# Patient Record
Sex: FEMALE | Race: WHITE | NOT HISPANIC OR LATINO | Employment: OTHER | URBAN - METROPOLITAN AREA
[De-identification: names, ages, dates, MRNs, and addresses within clinical notes are randomized per-mention and may not be internally consistent; named-entity substitution may affect disease eponyms.]

---

## 2017-01-04 ENCOUNTER — OFFICE VISIT (OUTPATIENT)
Dept: FAMILY MEDICINE CLINIC | Facility: CLINIC | Age: 67
End: 2017-01-04

## 2017-01-04 VITALS
DIASTOLIC BLOOD PRESSURE: 64 MMHG | OXYGEN SATURATION: 98 % | RESPIRATION RATE: 16 BRPM | HEART RATE: 68 BPM | TEMPERATURE: 98 F | WEIGHT: 104 LBS | HEIGHT: 62 IN | BODY MASS INDEX: 19.14 KG/M2 | SYSTOLIC BLOOD PRESSURE: 110 MMHG

## 2017-01-04 DIAGNOSIS — R51.9 HEADACHE, UNSPECIFIED HEADACHE TYPE: Primary | ICD-10-CM

## 2017-01-04 DIAGNOSIS — Z83.438 FAMILY HISTORY OF ELEVATED BLOOD LIPIDS: ICD-10-CM

## 2017-01-04 DIAGNOSIS — R79.89 LOW VITAMIN D LEVEL: ICD-10-CM

## 2017-01-04 PROCEDURE — 99214 OFFICE O/P EST MOD 30 MIN: CPT | Performed by: FAMILY MEDICINE

## 2017-01-04 RX ORDER — BUTALBITAL, ACETAMINOPHEN, CAFFEINE AND CODEINE PHOSPHATE 50; 325; 40; 30 MG/1; MG/1; MG/1; MG/1
1 CAPSULE ORAL DAILY PRN
Qty: 90 CAPSULE | Refills: 0 | Status: SHIPPED | OUTPATIENT
Start: 2017-01-04 | End: 2017-05-24 | Stop reason: SDUPTHER

## 2017-01-04 RX ORDER — NAPROXEN 375 MG/1
375 TABLET ORAL 2 TIMES DAILY PRN
Qty: 90 TABLET | Refills: 1 | Status: SHIPPED | OUTPATIENT
Start: 2017-01-04 | End: 2019-10-08 | Stop reason: SDUPTHER

## 2017-01-04 RX ORDER — RIZATRIPTAN BENZOATE 10 MG/1
10 TABLET, ORALLY DISINTEGRATING ORAL ONCE AS NEEDED
Qty: 27 TABLET | Refills: 1 | Status: SHIPPED | OUTPATIENT
Start: 2017-01-04 | End: 2017-05-24 | Stop reason: SDUPTHER

## 2017-01-04 NOTE — PATIENT INSTRUCTIONS
Patients must be seen every 3 months for their presription medication review and refill required by [place] law.  Patient has been advised on the potential for addiction  and dependence to their prescribed scheduled medication.  NEVIN was obtained today and reviewed. This was scanned into the patient's chart.  Exercise 30 minutes most days of the week  Sleep 6-8 hours each night if possible  Low fat, low cholesterol diet   we discussed prescribed medications and how to take them   make sure you get results of any labs/studies ordered today  Low glycemic index diet

## 2017-01-04 NOTE — MR AVS SNAPSHOT
Orly Rivas   1/4/2017 1:45 PM   Office Visit    Provider:  Rich Yeung MD   Department:  [facility]   Dept Phone:  [phone]                Your Full Care Plan              Where to Get Your Medications      These medications were sent to [facility] - [place], [place] - [address] [place] [address] - [phone]  - [phone]   [address] [facility] [address]     Phone:  [phone]     naproxen 375 MG tablet    rizatriptan MLT 10 MG disintegrating tablet         You can get these medications from any pharmacy     Bring a paper prescription for each of these medications     butalbital-acetaminophen-caffeine-codeine -33-30 MG per capsule            Your Updated Medication List          This list is accurate as of: 1/4/17  3:02 PM.  Always use your most recent med list.                aspirin 81 MG tablet       butalbital-acetaminophen-caffeine-codeine -66-30 MG per capsule   Commonly known as:  FIORICET WITH CODEINE   Take 1 capsule by mouth Daily As Needed for headaches.       diphenhydrAMINE 25 mg capsule   Commonly known as:  BENADRYL       naproxen 375 MG tablet   Commonly known as:  NAPROSYN   Take 1 tablet by mouth 2 (Two) Times a Day As Needed for mild pain (1-3).       rizatriptan MLT 10 MG disintegrating tablet   Commonly known as:  MAXALT-MLT   Take 1 tablet by mouth 1 (One) Time As Needed for migraine for up to 1 dose. May repeat in 2 hours if needed               You Were Diagnosed With        Codes Comments    Headache, unspecified headache type    -  Primary ICD-10-CM: R51  ICD-9-CM: 784.0     Low vitamin D level     ICD-10-CM: E55.9  ICD-9-CM: 268.9     Family history of elevated blood lipids     ICD-10-CM: Z83.49  ICD-9-CM: V18.19       Instructions     Patients must be seen every 3 months for their presription medication review and refill required by [place] law.  Patient has been advised on the  "potential for addiction  and dependence to their prescribed scheduled medication.  NEVIN was obtained today and reviewed. This was scanned into the patient's chart.  Exercise 30 minutes most days of the week  Sleep 6-8 hours each night if possible  Low fat, low cholesterol diet   we discussed prescribed medications and how to take them   make sure you get results of any labs/studies ordered today  Low glycemic index diet          Patient Instructions History      GoAlbert Signup     [facility] GoAlbert allows you to send messages to your doctor, view your test results, renew your prescriptions, schedule appointments, and more. To sign up, go to [employer] and click on the Sign Up Now link in the New User? box. Enter your GoAlbert Activation Code exactly as it appears below along with the last four digits of your Social Security Number and your Date of Birth () to complete the sign-up process. If you do not sign up before the expiration date, you must request a new code.    GoAlbert Activation Code: [email or web address]  Expires: 2017  3:02 PM    If you have questions, you can email [email or web address] or call [phone] to talk to our GoAlbert staff. Remember, GoAlbert is NOT to be used for urgent needs. For medical emergencies, dial 911.               Other Info from Your Visit           Allergies     No Known Allergies      Reason for Visit     Headache           Vital Signs     Blood Pressure Pulse Temperature Respirations Height Weight    110/64 68 98 °F (36.7 °C) (Oral) 16 62\" (157.5 cm) 104 lb (47.2 kg)    Oxygen Saturation Body Mass Index Smoking Status             98% 19.02 kg/m2 Never Smoker         Problems and Diagnoses Noted     Headache    Low vitamin D level        Family history of elevated blood lipids          "

## 2017-01-04 NOTE — PROGRESS NOTES
"Subjective   Orly Rivas is a 66 y.o. female.     History of Present Illness   Chief Complaint:   Chief Complaint   Patient presents with   • Headache       Orly Rivas 66 y.o. female who presents today for Medical Management of the below listed issues and medication refills. The patient has read and signed the [facility] Controlled Substance Contract.  I will continue to see patient for regular follow up appointments. Headaches 1/week.  They are well controlled on their medication.  NEVIN has been reviewed by me and is updated every 3 months. The patient is aware of the potential for addiction and dependence.   she has a history of   Patient Active Problem List   Diagnosis   • Headache   .  Since the last visit, she has overall felt well.  she has been compliant with   Current Outpatient Prescriptions:   •  aspirin 81 MG tablet, Take 81 mg by mouth daily., Disp: , Rfl:   •  butalbital-acetaminophen-caffeine-codeine (FIORICET WITH CODEINE) -52-30 MG per capsule, Take 1 capsule by mouth daily as needed for headaches., Disp: 30 capsule, Rfl: 2  •  diphenhydrAMINE (BENADRYL) 25 mg capsule, Take 25 mg by mouth every 6 (six) hours as needed for itching., Disp: , Rfl:   •  naproxen (NAPROSYN) 375 MG tablet, Take 1 tablet by mouth 2 (two) times a day as needed for mild pain (1-3)., Disp: 60 tablet, Rfl: 5  •  rizatriptan MLT (MAXALT-MLT) 10 MG disintegrating tablet, Take 1 tablet by mouth 1 (one) time as needed for migraine for up to 1 dose. May repeat in 2 hours if needed, Disp: 9 tablet, Rfl: 5.  she denies medication side effects.    All of the chronic condition(s) listed above are stable w/o issues.    Visit Vitals   • /64   • Pulse 68   • Temp 98 °F (36.7 °C) (Oral)   • Resp 16   • Ht 62\" (157.5 cm)   • Wt 104 lb (47.2 kg)   • SpO2 98%   • BMI 19.02 kg/m2             The following portions of the patient's history were reviewed and updated as appropriate: allergies, current medications, past " family history, past medical history, past social history, past surgical history and problem list.    Review of Systems   Constitutional: Negative for activity change, appetite change and unexpected weight change.   Eyes: Negative for visual disturbance.   Respiratory: Negative for chest tightness and shortness of breath.    Cardiovascular: Negative for chest pain and palpitations.   Skin: Negative for color change.   Neurological: Positive for headaches. Negative for syncope and speech difficulty.   Psychiatric/Behavioral: Negative for confusion and decreased concentration.       Objective   Physical Exam   Constitutional: She is oriented to person, place, and time. She appears well-developed and well-nourished.   HENT:   Head: Normocephalic and atraumatic.   Nose: Nose normal.   Mouth/Throat: Oropharynx is clear and moist.   Eyes: EOM are normal. Pupils are equal, round, and reactive to light.   Neck: Normal range of motion. Neck supple.   Cardiovascular: Normal rate and regular rhythm.    Pulmonary/Chest: Effort normal and breath sounds normal.   Abdominal: Soft.   Neurological: She is alert and oriented to person, place, and time. She has normal reflexes.   Psychiatric: She has a normal mood and affect. Her behavior is normal. Judgment and thought content normal.   Nursing note and vitals reviewed.      Assessment/Plan   Orly was seen today for headache.    Diagnoses and all orders for this visit:    Headache, unspecified headache type  -     butalbital-acetaminophen-caffeine-codeine (FIORICET WITH CODEINE) -45-30 MG per capsule; Take 1 capsule by mouth Daily As Needed for headaches.  -     naproxen (NAPROSYN) 375 MG tablet; Take 1 tablet by mouth 2 (Two) Times a Day As Needed for mild pain (1-3).  -     rizatriptan MLT (MAXALT-MLT) 10 MG disintegrating tablet; Take 1 tablet by mouth 1 (One) Time As Needed for migraine for up to 1 dose. May repeat in 2 hours if needed  -     Comprehensive metabolic  panel; Future  -     Lipid panel; Future  -     CBC and Differential; Future  -     TSH; Future    Low vitamin D level  -     Comprehensive metabolic panel; Future  -     Vitamin D 25 Hydroxy; Future    Family history of elevated blood lipids  -     Comprehensive metabolic panel; Future  -     Lipid panel; Future

## 2017-02-10 ENCOUNTER — APPOINTMENT (OUTPATIENT)
Dept: WOMENS IMAGING | Facility: HOSPITAL | Age: 67
End: 2017-02-10

## 2017-02-10 PROCEDURE — 77063 BREAST TOMOSYNTHESIS BI: CPT | Performed by: RADIOLOGY

## 2017-02-10 PROCEDURE — G0202 SCR MAMMO BI INCL CAD: HCPCS | Performed by: RADIOLOGY

## 2017-05-22 DIAGNOSIS — N95.1 POST MENOPAUSAL SYNDROME: Primary | ICD-10-CM

## 2017-05-22 DIAGNOSIS — R79.89 LOW VITAMIN D LEVEL: ICD-10-CM

## 2017-05-22 DIAGNOSIS — R51.9 HEADACHE, UNSPECIFIED HEADACHE TYPE: ICD-10-CM

## 2017-05-22 DIAGNOSIS — Z83.438 FAMILY HISTORY OF ELEVATED BLOOD LIPIDS: ICD-10-CM

## 2017-05-22 DIAGNOSIS — M85.80 OSTEOPENIA: ICD-10-CM

## 2017-05-22 LAB
25(OH)D3+25(OH)D2 SERPL-MCNC: 37.3 NG/ML (ref 30–100)
ALBUMIN SERPL-MCNC: 4.2 G/DL (ref 3.5–5.2)
ALBUMIN/GLOB SERPL: 1.5 G/DL
ALP SERPL-CCNC: 55 U/L (ref 39–117)
ALT SERPL-CCNC: 15 U/L (ref 1–33)
AST SERPL-CCNC: 22 U/L (ref 1–32)
BASOPHILS # BLD AUTO: 0.06 10*3/MM3 (ref 0–0.2)
BASOPHILS NFR BLD AUTO: 1.4 % (ref 0–1.5)
BILIRUB SERPL-MCNC: 0.3 MG/DL (ref 0.1–1.2)
BUN SERPL-MCNC: 14 MG/DL (ref 8–23)
BUN/CREAT SERPL: 19.7 (ref 7–25)
CALCIUM SERPL-MCNC: 9.8 MG/DL (ref 8.6–10.5)
CHLORIDE SERPL-SCNC: 105 MMOL/L (ref 98–107)
CHOLEST SERPL-MCNC: 205 MG/DL (ref 0–200)
CO2 SERPL-SCNC: 27.8 MMOL/L (ref 22–29)
CREAT SERPL-MCNC: 0.71 MG/DL (ref 0.57–1)
EOSINOPHIL # BLD AUTO: 0.14 10*3/MM3 (ref 0–0.7)
EOSINOPHIL NFR BLD AUTO: 3.2 % (ref 0.3–6.2)
ERYTHROCYTE [DISTWIDTH] IN BLOOD BY AUTOMATED COUNT: 12.7 % (ref 11.7–13)
GLOBULIN SER CALC-MCNC: 2.8 GM/DL
GLUCOSE SERPL-MCNC: 98 MG/DL (ref 65–99)
HCT VFR BLD AUTO: 41.4 % (ref 35.6–45.5)
HDLC SERPL-MCNC: 63 MG/DL (ref 40–60)
HGB BLD-MCNC: 13.5 G/DL (ref 11.9–15.5)
IMM GRANULOCYTES # BLD: 0 10*3/MM3 (ref 0–0.03)
IMM GRANULOCYTES NFR BLD: 0 % (ref 0–0.5)
LDLC SERPL CALC-MCNC: 123 MG/DL (ref 0–100)
LYMPHOCYTES # BLD AUTO: 1.96 10*3/MM3 (ref 0.9–4.8)
LYMPHOCYTES NFR BLD AUTO: 45.4 % (ref 19.6–45.3)
MCH RBC QN AUTO: 30.4 PG (ref 26.9–32)
MCHC RBC AUTO-ENTMCNC: 32.6 G/DL (ref 32.4–36.3)
MCV RBC AUTO: 93.2 FL (ref 80.5–98.2)
MONOCYTES # BLD AUTO: 0.36 10*3/MM3 (ref 0.2–1.2)
MONOCYTES NFR BLD AUTO: 8.3 % (ref 5–12)
NEUTROPHILS # BLD AUTO: 1.8 10*3/MM3 (ref 1.9–8.1)
NEUTROPHILS NFR BLD AUTO: 41.7 % (ref 42.7–76)
PLATELET # BLD AUTO: 243 10*3/MM3 (ref 140–500)
POTASSIUM SERPL-SCNC: 4.1 MMOL/L (ref 3.5–5.2)
PROT SERPL-MCNC: 7 G/DL (ref 6–8.5)
RBC # BLD AUTO: 4.44 10*6/MM3 (ref 3.9–5.2)
SODIUM SERPL-SCNC: 142 MMOL/L (ref 136–145)
TRIGL SERPL-MCNC: 97 MG/DL (ref 0–150)
TSH SERPL DL<=0.005 MIU/L-ACNC: 3.38 MIU/ML (ref 0.27–4.2)
VLDLC SERPL CALC-MCNC: 19.4 MG/DL (ref 5–40)
WBC # BLD AUTO: 4.32 10*3/MM3 (ref 4.5–10.7)

## 2017-05-22 PROCEDURE — 77080 DXA BONE DENSITY AXIAL: CPT | Performed by: FAMILY MEDICINE

## 2017-05-24 ENCOUNTER — OFFICE VISIT (OUTPATIENT)
Dept: FAMILY MEDICINE CLINIC | Facility: CLINIC | Age: 67
End: 2017-05-24

## 2017-05-24 VITALS
HEIGHT: 62 IN | SYSTOLIC BLOOD PRESSURE: 99 MMHG | HEART RATE: 60 BPM | RESPIRATION RATE: 16 BRPM | TEMPERATURE: 98.5 F | BODY MASS INDEX: 18.95 KG/M2 | DIASTOLIC BLOOD PRESSURE: 68 MMHG | OXYGEN SATURATION: 95 % | WEIGHT: 103 LBS

## 2017-05-24 DIAGNOSIS — R51.9 HEADACHE, UNSPECIFIED HEADACHE TYPE: ICD-10-CM

## 2017-05-24 PROCEDURE — 99214 OFFICE O/P EST MOD 30 MIN: CPT | Performed by: FAMILY MEDICINE

## 2017-05-24 RX ORDER — NAPROXEN 375 MG/1
375 TABLET ORAL 2 TIMES DAILY PRN
Qty: 90 TABLET | Refills: 1 | Status: CANCELLED | OUTPATIENT
Start: 2017-05-24

## 2017-05-24 RX ORDER — RIZATRIPTAN BENZOATE 10 MG/1
10 TABLET, ORALLY DISINTEGRATING ORAL ONCE AS NEEDED
Qty: 27 TABLET | Refills: 1 | Status: SHIPPED | OUTPATIENT
Start: 2017-05-24 | End: 2021-07-01 | Stop reason: SDUPTHER

## 2017-05-24 RX ORDER — BUTALBITAL, ACETAMINOPHEN, CAFFEINE AND CODEINE PHOSPHATE 50; 325; 40; 30 MG/1; MG/1; MG/1; MG/1
1 CAPSULE ORAL DAILY PRN
Qty: 90 CAPSULE | Refills: 0 | Status: SHIPPED | OUTPATIENT
Start: 2017-05-24 | End: 2017-11-14 | Stop reason: SDUPTHER

## 2017-06-01 ENCOUNTER — TELEPHONE (OUTPATIENT)
Dept: FAMILY MEDICINE CLINIC | Facility: CLINIC | Age: 67
End: 2017-06-01

## 2017-06-01 NOTE — TELEPHONE ENCOUNTER
Pt is taking vitamin d 1600 units in her multivitamin. She did find Vitamin D gums with 2000 units do you want her to take that too

## 2017-11-14 ENCOUNTER — OFFICE VISIT (OUTPATIENT)
Dept: FAMILY MEDICINE CLINIC | Facility: CLINIC | Age: 67
End: 2017-11-14

## 2017-11-14 VITALS
RESPIRATION RATE: 16 BRPM | WEIGHT: 106 LBS | DIASTOLIC BLOOD PRESSURE: 54 MMHG | HEIGHT: 62 IN | BODY MASS INDEX: 19.51 KG/M2 | TEMPERATURE: 97.8 F | SYSTOLIC BLOOD PRESSURE: 98 MMHG | OXYGEN SATURATION: 99 % | HEART RATE: 67 BPM

## 2017-11-14 DIAGNOSIS — R51.9 HEADACHE, UNSPECIFIED HEADACHE TYPE: ICD-10-CM

## 2017-11-14 PROCEDURE — 99213 OFFICE O/P EST LOW 20 MIN: CPT | Performed by: FAMILY MEDICINE

## 2017-11-14 RX ORDER — BUTALBITAL, ACETAMINOPHEN, CAFFEINE AND CODEINE PHOSPHATE 50; 325; 40; 30 MG/1; MG/1; MG/1; MG/1
1 CAPSULE ORAL DAILY PRN
Qty: 90 CAPSULE | Refills: 0 | Status: SHIPPED | OUTPATIENT
Start: 2017-11-14 | End: 2018-05-01 | Stop reason: SDUPTHER

## 2017-11-14 RX ORDER — NAPROXEN 375 MG/1
375 TABLET ORAL 2 TIMES DAILY PRN
Qty: 90 TABLET | Refills: 1 | Status: CANCELLED | OUTPATIENT
Start: 2017-11-14

## 2017-11-14 NOTE — PROGRESS NOTES
"Subjective   Orly Rivas is a 67 y.o. female.     History of Present Illness   Chief Complaint:   Chief Complaint   Patient presents with   • Headache       Orly Rivas 67 y.o. female who presents today for Medical Management of the below listed issues and medication refills. The patient has read and signed the [facility] Controlled Substance Contract.  I will continue to see patient for regular follow up appointments.  They are well controlled on their medication.  NEVIN has been reviewed by me and is updated every 3 months. The patient is aware of the potential for addiction and dependence.    she has a problem list of   Patient Active Problem List   Diagnosis   • Headache   .  Since the last visit, she has overall felt well.  she has been compliant with   Current Outpatient Prescriptions:   •  aspirin 81 MG tablet, Take 81 mg by mouth daily., Disp: , Rfl:   •  butalbital-acetaminophen-caffeine-codeine (FIORICET WITH CODEINE) -93-30 MG per capsule, Take 1 capsule by mouth Daily As Needed for Headache., Disp: 90 capsule, Rfl: 0  •  diphenhydrAMINE (BENADRYL) 25 mg capsule, Take 25 mg by mouth every 6 (six) hours as needed for itching., Disp: , Rfl:   •  naproxen (NAPROSYN) 375 MG tablet, Take 1 tablet by mouth 2 (Two) Times a Day As Needed for mild pain (1-3)., Disp: 90 tablet, Rfl: 1  •  rizatriptan MLT (MAXALT-MLT) 10 MG disintegrating tablet, Take 1 tablet by mouth 1 (One) Time As Needed for Migraine for up to 1 dose. May repeat in 2 hours if needed, Disp: 27 tablet, Rfl: 1.  she denies medication side effects. Migraine  q 1-2 weeks    All of the chronic condition(s) listed above are stable w/o issues.    BP 98/54  Pulse 67  Temp 97.8 °F (36.6 °C) (Oral)   Resp 16  Ht 62\" (157.5 cm)  Wt 106 lb (48.1 kg)  SpO2 99%  BMI 19.39 kg/m2    The following portions of the patient's history were reviewed and updated as appropriate: allergies, current medications, past family history, past " medical history, past social history, past surgical history and problem list.    Review of Systems   Constitutional: Negative for activity change, appetite change and unexpected weight change.   Eyes: Negative for visual disturbance.   Respiratory: Negative for chest tightness and shortness of breath.    Cardiovascular: Negative for chest pain and palpitations.   Skin: Negative for color change.   Neurological: Negative for syncope and speech difficulty.   Psychiatric/Behavioral: Negative for confusion and decreased concentration.       Objective   Physical Exam   Constitutional: She is oriented to person, place, and time. She appears well-developed and well-nourished.   HENT:   Head: Normocephalic.   Nose: Nose normal.   Mouth/Throat: Oropharynx is clear and moist.   Eyes: Pupils are equal, round, and reactive to light.   Neck: Normal range of motion. Neck supple.   Cardiovascular: Normal rate and regular rhythm.    Pulmonary/Chest: Effort normal and breath sounds normal.   Lymphadenopathy:     She has no cervical adenopathy.   Neurological: She is alert and oriented to person, place, and time.   Psychiatric: She has a normal mood and affect. Her behavior is normal.   Nursing note and vitals reviewed.      Assessment/Plan   Orly was seen today for headache.    Diagnoses and all orders for this visit:    Headache, unspecified headache type  -     butalbital-acetaminophen-caffeine-codeine (FIORICET WITH CODEINE) -98-30 MG per capsule; Take 1 capsule by mouth Daily As Needed for Headache.    Other orders  -     Cancel: naproxen (NAPROSYN) 375 MG tablet; Take 1 tablet by mouth 2 (Two) Times a Day As Needed for Mild Pain .

## 2017-12-21 DIAGNOSIS — M81.0 OSTEOPOROSIS, UNSPECIFIED OSTEOPOROSIS TYPE, UNSPECIFIED PATHOLOGICAL FRACTURE PRESENCE: Primary | ICD-10-CM

## 2018-01-11 LAB
BUN SERPL-MCNC: 13 MG/DL (ref 8–23)
BUN/CREAT SERPL: 16 (ref 7–25)
CALCIUM SERPL-MCNC: 9.7 MG/DL (ref 8.6–10.5)
CHLORIDE SERPL-SCNC: 106 MMOL/L (ref 98–107)
CO2 SERPL-SCNC: 27.4 MMOL/L (ref 22–29)
CREAT SERPL-MCNC: 0.81 MG/DL (ref 0.57–1)
GLUCOSE SERPL-MCNC: 92 MG/DL (ref 65–99)
POTASSIUM SERPL-SCNC: 4.1 MMOL/L (ref 3.5–5.2)
SODIUM SERPL-SCNC: 146 MMOL/L (ref 136–145)

## 2018-01-16 PROBLEM — M81.0 OSTEOPOROSIS, POST-MENOPAUSAL: Status: ACTIVE | Noted: 2018-01-16

## 2018-01-17 ENCOUNTER — HOSPITAL ENCOUNTER (OUTPATIENT)
Dept: INFUSION THERAPY | Facility: HOSPITAL | Age: 68
Discharge: HOME | End: 2018-01-17

## 2018-01-23 ENCOUNTER — HOSPITAL ENCOUNTER (OUTPATIENT)
Dept: INFUSION THERAPY | Facility: HOSPITAL | Age: 68
Discharge: HOME | End: 2018-01-23
Admitting: FAMILY MEDICINE

## 2018-01-23 VITALS
RESPIRATION RATE: 16 BRPM | HEART RATE: 67 BPM | TEMPERATURE: 96.8 F | OXYGEN SATURATION: 99 % | SYSTOLIC BLOOD PRESSURE: 123 MMHG | DIASTOLIC BLOOD PRESSURE: 71 MMHG

## 2018-01-23 DIAGNOSIS — M81.0 OSTEOPOROSIS, POST-MENOPAUSAL: ICD-10-CM

## 2018-01-23 PROCEDURE — 96372 THER/PROPH/DIAG INJ SC/IM: CPT

## 2018-01-23 PROCEDURE — 25010000002 DENOSUMAB 60 MG/ML SOLUTION: Performed by: FAMILY MEDICINE

## 2018-01-23 RX ADMIN — DENOSUMAB 60 MG: 60 INJECTION SUBCUTANEOUS at 11:44

## 2018-01-23 NOTE — PROGRESS NOTES
Tolerated well. Monitored for 30 minutes for first dose protocol without incident. D/C'd per ambulation@ 1215.

## 2018-03-02 ENCOUNTER — APPOINTMENT (OUTPATIENT)
Dept: WOMENS IMAGING | Facility: HOSPITAL | Age: 68
End: 2018-03-02

## 2018-03-02 PROCEDURE — 77063 BREAST TOMOSYNTHESIS BI: CPT | Performed by: RADIOLOGY

## 2018-03-02 PROCEDURE — 77067 SCR MAMMO BI INCL CAD: CPT | Performed by: RADIOLOGY

## 2018-05-01 ENCOUNTER — OFFICE VISIT (OUTPATIENT)
Dept: FAMILY MEDICINE CLINIC | Facility: CLINIC | Age: 68
End: 2018-05-01

## 2018-05-01 VITALS
RESPIRATION RATE: 16 BRPM | WEIGHT: 104 LBS | DIASTOLIC BLOOD PRESSURE: 60 MMHG | TEMPERATURE: 98.3 F | SYSTOLIC BLOOD PRESSURE: 89 MMHG | OXYGEN SATURATION: 97 % | HEART RATE: 79 BPM | BODY MASS INDEX: 19.14 KG/M2 | HEIGHT: 62 IN

## 2018-05-01 DIAGNOSIS — M81.0 OSTEOPOROSIS, POST-MENOPAUSAL: ICD-10-CM

## 2018-05-01 DIAGNOSIS — R51.9 HEADACHE, UNSPECIFIED HEADACHE TYPE: Primary | ICD-10-CM

## 2018-05-01 PROCEDURE — 99214 OFFICE O/P EST MOD 30 MIN: CPT | Performed by: FAMILY MEDICINE

## 2018-05-01 RX ORDER — RIZATRIPTAN BENZOATE 10 MG/1
10 TABLET, ORALLY DISINTEGRATING ORAL ONCE AS NEEDED
Qty: 27 TABLET | Refills: 1 | Status: CANCELLED | OUTPATIENT
Start: 2018-05-01

## 2018-05-01 RX ORDER — NAPROXEN 375 MG/1
375 TABLET ORAL 2 TIMES DAILY PRN
Qty: 90 TABLET | Refills: 1 | Status: CANCELLED | OUTPATIENT
Start: 2018-05-01

## 2018-05-01 RX ORDER — BUTALBITAL, ACETAMINOPHEN, CAFFEINE AND CODEINE PHOSPHATE 50; 325; 40; 30 MG/1; MG/1; MG/1; MG/1
1 CAPSULE ORAL DAILY PRN
Qty: 90 CAPSULE | Refills: 0 | Status: SHIPPED | OUTPATIENT
Start: 2018-05-01 | End: 2018-05-08 | Stop reason: SDUPTHER

## 2018-05-01 NOTE — PROGRESS NOTES
"Subjective   Orly Rivas is a 67 y.o. female.     History of Present Illness   Chief Complaint:   Chief Complaint   Patient presents with   • Headache       Orly Rivas 67 y.o. female who presents today for Medical Management of the below listed issues and medication refills. The patient has read and signed the [facility] Controlled Substance Contract.  I will continue to see patient for regular follow up appointments.  They are well controlled on their medication.  NEVIN has been reviewed by me and is updated every 3 months. The patient is aware of the potential for addiction and dependence. She started with uri Sunday and felt aches and chills and now feels ok  Exam neg   / VIRAL SYNDROME  STARTED PROLIA 6 MONTHS AGO  And did fine    she has a problem list of   Patient Active Problem List   Diagnosis   • Headache   • Osteoporosis, post-menopausal   .  Since the last visit, she has overall felt well.  she has been compliant with   Current Outpatient Prescriptions:   •  aspirin 81 MG tablet, Take 81 mg by mouth daily., Disp: , Rfl:   •  butalbital-acetaminophen-caffeine-codeine (FIORICET WITH CODEINE) -52-30 MG per capsule, Take 1 capsule by mouth Daily As Needed for Headache., Disp: 90 capsule, Rfl: 0  •  diphenhydrAMINE (BENADRYL) 25 mg capsule, Take 25 mg by mouth every 6 (six) hours as needed for itching., Disp: , Rfl:   •  Loratadine (ALLERGY RELIEF CHILD PO), Take  by mouth., Disp: , Rfl:   •  naproxen (NAPROSYN) 375 MG tablet, Take 1 tablet by mouth 2 (Two) Times a Day As Needed for mild pain (1-3)., Disp: 90 tablet, Rfl: 1  •  rizatriptan MLT (MAXALT-MLT) 10 MG disintegrating tablet, Take 1 tablet by mouth 1 (One) Time As Needed for Migraine for up to 1 dose. May repeat in 2 hours if needed, Disp: 27 tablet, Rfl: 1.  she denies medication side effects.    All of the chronic condition(s) listed above are stable w/o issues.    Resp 16   Ht 157.5 cm (62\")     The following portions of the " patient's history were reviewed and updated as appropriate: allergies, current medications, past family history, past medical history, past social history, past surgical history and problem list.    Review of Systems   Constitutional: Negative for activity change, appetite change, fatigue and unexpected weight change.   HENT: Negative for congestion.    Eyes: Negative for visual disturbance.   Respiratory: Negative for chest tightness and shortness of breath.    Cardiovascular: Negative for chest pain and palpitations.   Gastrointestinal: Negative for abdominal pain and constipation.   Endocrine: Negative for cold intolerance, heat intolerance, polydipsia, polyphagia and polyuria.   Genitourinary: Negative for difficulty urinating, dysuria and menstrual problem.   Musculoskeletal: Negative for arthralgias.   Skin: Negative for rash.   Neurological: Negative for headaches.   Psychiatric/Behavioral: Negative for behavioral problems, dysphoric mood and sleep disturbance. The patient is not nervous/anxious.        Objective   Physical Exam   Constitutional: She is oriented to person, place, and time. She appears well-developed and well-nourished.   HENT:   Head: Normocephalic and atraumatic.   Eyes: EOM are normal.   Neck: Normal range of motion.   Cardiovascular: Normal rate and regular rhythm.    Pulmonary/Chest: Effort normal and breath sounds normal.   Abdominal: Soft. Bowel sounds are normal.   Musculoskeletal: Normal range of motion.   Neurological: She is alert and oriented to person, place, and time.   Skin: Skin is warm and dry.   Psychiatric: She has a normal mood and affect. Her behavior is normal.   Nursing note and vitals reviewed.      Assessment/Plan   Orly was seen today for headache.    Diagnoses and all orders for this visit:    Headache, unspecified headache type  -     butalbital-acetaminophen-caffeine-codeine (FIORICET WITH CODEINE) -55-30 MG per capsule; Take 1 capsule by mouth Daily As  Needed for Headache.    Osteoporosis, post-menopausal    Other orders  -     Cancel: rizatriptan MLT (MAXALT-MLT) 10 MG disintegrating tablet; Take 1 tablet by mouth 1 (One) Time As Needed for Migraine for up to 1 dose. May repeat in 2 hours if needed  -     Cancel: naproxen (NAPROSYN) 375 MG tablet; Take 1 tablet by mouth 2 (Two) Times a Day As Needed for Mild Pain .

## 2018-05-08 ENCOUNTER — TELEPHONE (OUTPATIENT)
Dept: FAMILY MEDICINE CLINIC | Facility: CLINIC | Age: 68
End: 2018-05-08

## 2018-05-08 DIAGNOSIS — R51.9 HEADACHE, UNSPECIFIED HEADACHE TYPE: ICD-10-CM

## 2018-05-08 RX ORDER — BUTALBITAL, ACETAMINOPHEN, CAFFEINE AND CODEINE PHOSPHATE 50; 325; 40; 30 MG/1; MG/1; MG/1; MG/1
1 CAPSULE ORAL DAILY PRN
Qty: 50 CAPSULE | Refills: 1 | Status: SHIPPED | OUTPATIENT
Start: 2018-05-08 | End: 2018-05-23 | Stop reason: SDUPTHER

## 2018-05-08 NOTE — TELEPHONE ENCOUNTER
[facility] called wanting to know if you would the fioricet rx for 45-50 tab instead of the 90 tab. Pt only gets the medication about every six months. So dispensing 90 pills is to much

## 2018-05-23 ENCOUNTER — TELEPHONE (OUTPATIENT)
Dept: FAMILY MEDICINE CLINIC | Facility: CLINIC | Age: 68
End: 2018-05-23

## 2018-05-23 DIAGNOSIS — R51.9 HEADACHE, UNSPECIFIED HEADACHE TYPE: ICD-10-CM

## 2018-05-23 RX ORDER — BUTALBITAL, ACETAMINOPHEN, CAFFEINE AND CODEINE PHOSPHATE 50; 325; 40; 30 MG/1; MG/1; MG/1; MG/1
1 CAPSULE ORAL DAILY PRN
Qty: 7 CAPSULE | Refills: 0 | Status: SHIPPED | OUTPATIENT
Start: 2018-05-23 | End: 2018-11-13 | Stop reason: SDUPTHER

## 2018-07-12 DIAGNOSIS — M81.0 OSTEOPOROSIS, POST-MENOPAUSAL: Primary | ICD-10-CM

## 2018-07-17 LAB
BUN SERPL-MCNC: 13 MG/DL (ref 8–23)
BUN/CREAT SERPL: 17.3 (ref 7–25)
CALCIUM SERPL-MCNC: 9.8 MG/DL (ref 8.6–10.5)
CHLORIDE SERPL-SCNC: 105 MMOL/L (ref 98–107)
CO2 SERPL-SCNC: 26.6 MMOL/L (ref 22–29)
CREAT SERPL-MCNC: 0.75 MG/DL (ref 0.57–1)
GLUCOSE SERPL-MCNC: 91 MG/DL (ref 65–99)
POTASSIUM SERPL-SCNC: 4.3 MMOL/L (ref 3.5–5.2)
SODIUM SERPL-SCNC: 145 MMOL/L (ref 136–145)

## 2018-07-24 ENCOUNTER — INFUSION (OUTPATIENT)
Dept: ONCOLOGY | Facility: HOSPITAL | Age: 68
End: 2018-07-24

## 2018-07-24 DIAGNOSIS — M81.0 OSTEOPOROSIS, POST-MENOPAUSAL: Primary | ICD-10-CM

## 2018-07-24 PROCEDURE — 96372 THER/PROPH/DIAG INJ SC/IM: CPT | Performed by: NURSE PRACTITIONER

## 2018-07-24 PROCEDURE — 25010000002 DENOSUMAB 60 MG/ML SOLUTION: Performed by: FAMILY MEDICINE

## 2018-07-24 RX ADMIN — DENOSUMAB 60 MG: 60 INJECTION SUBCUTANEOUS at 14:25

## 2018-07-24 NOTE — PROGRESS NOTES
Pt arrived ambulatory for Prolia injection.  Labs reviewed and pt voices no complaints or questions regarding the medication.  Pt given instructions to schedule next appt. And injection given without complication.

## 2018-11-06 DIAGNOSIS — M81.0 OSTEOPOROSIS, POST-MENOPAUSAL: Primary | ICD-10-CM

## 2018-11-13 ENCOUNTER — OFFICE VISIT (OUTPATIENT)
Dept: FAMILY MEDICINE CLINIC | Facility: CLINIC | Age: 68
End: 2018-11-13

## 2018-11-13 VITALS
HEIGHT: 62 IN | HEART RATE: 59 BPM | WEIGHT: 106 LBS | OXYGEN SATURATION: 99 % | TEMPERATURE: 97.8 F | RESPIRATION RATE: 16 BRPM | BODY MASS INDEX: 19.51 KG/M2 | DIASTOLIC BLOOD PRESSURE: 66 MMHG | SYSTOLIC BLOOD PRESSURE: 108 MMHG

## 2018-11-13 DIAGNOSIS — R51.9 HEADACHE, UNSPECIFIED HEADACHE TYPE: ICD-10-CM

## 2018-11-13 PROCEDURE — 99213 OFFICE O/P EST LOW 20 MIN: CPT | Performed by: FAMILY MEDICINE

## 2018-11-13 RX ORDER — BUTALBITAL, ACETAMINOPHEN, CAFFEINE AND CODEINE PHOSPHATE 50; 325; 40; 30 MG/1; MG/1; MG/1; MG/1
1 CAPSULE ORAL DAILY PRN
Qty: 45 CAPSULE | Refills: 0 | Status: SHIPPED | OUTPATIENT
Start: 2018-11-13 | End: 2019-02-13 | Stop reason: SDUPTHER

## 2018-11-13 NOTE — PATIENT INSTRUCTIONS
Exercise 30 minutes most days of the week  Sleep 6-8 hours each night if possible  Low fat, low cholesterol diet   we discussed prescribed medications and how to take them   make sure you get results of any labs/studies ordered today  Low glycemic index diet The patient has read and signed the [facility] Controlled Substance Contract.  I will continue to see patient for regular follow up appointments.  They are well controlled on their medication.  NEVIN has been reviewed by me and is updated every 3 months. The patient is aware of the potential for addiction and dependence.

## 2018-11-13 NOTE — PROGRESS NOTES
Subjective   Chief Complaint:   Chief Complaint   Patient presents with   • Headache         History of Present Illness  Patients must be seen every 3 months for their presription medication review and refill required by [place] law.  Patient has been advised on the potential for addiction  and dependence to their prescribed scheduled medication.  NEVIN was obtained today and reviewed. This was scanned into the patient's chart.  To decrease her Fioricet No. 3   To  45 tablets per 3 months.  So many give her 45 tablets of Fioricet No. 3  For 3 months.  And headaches are about the same in number and severity.  Discussed a #45 tablets per 3 months.    She is having 2-3 headaches per week          Orly Rivas 68 y.o. female who presents today for Medical Management of the below listed issues and medication refills.    ICD-10-CM ICD-9-CM   1. Headache, unspecified headache type R51 784.0        she has a problem list of   Patient Active Problem List   Diagnosis   • Headache   • Osteoporosis, post-menopausal   .  Since the last visit, she has overall felt well.  she has been compliant with   Current Outpatient Medications:   •  aspirin 81 MG tablet, Take 81 mg by mouth daily., Disp: , Rfl:   •  butalbital-acetaminophen-caffeine-codeine (FIORICET WITH CODEINE) -04-30 MG per capsule, Take 1 capsule by mouth Daily As Needed for Headache., Disp: 7 capsule, Rfl: 0  •  diphenhydrAMINE (BENADRYL) 25 mg capsule, Take 25 mg by mouth every 6 (six) hours as needed for itching., Disp: , Rfl:   •  Loratadine (ALLERGY RELIEF CHILD PO), Take  by mouth., Disp: , Rfl:   •  naproxen (NAPROSYN) 375 MG tablet, Take 1 tablet by mouth 2 (Two) Times a Day As Needed for mild pain (1-3)., Disp: 90 tablet, Rfl: 1  •  rizatriptan MLT (MAXALT-MLT) 10 MG disintegrating tablet, Take 1 tablet by mouth 1 (One) Time As Needed for Migraine for up to 1 dose. May repeat in 2 hours if needed, Disp: 27 tablet, Rfl: 1.  she denies medication side  "effects.    All of the chronic condition(s) listed above are stable w/o issues.    /66   Pulse 59   Temp 97.8 °F (36.6 °C)   Resp 16   Ht 157.5 cm (62\")   Wt 48.1 kg (106 lb)   SpO2 99%   BMI 19.39 kg/m²     Results for orders placed or performed in visit on 07/12/18   Basic Metabolic Panel   Result Value Ref Range    Glucose 91 65 - 99 mg/dL    BUN 13 8 - 23 mg/dL    Creatinine 0.75 0.57 - 1.00 mg/dL    eGFR Non African Am 77 >60 mL/min/1.73    eGFR African Am 94 >60 mL/min/1.73    BUN/Creatinine Ratio 17.3 7.0 - 25.0    Sodium 145 136 - 145 mmol/L    Potassium 4.3 3.5 - 5.2 mmol/L    Chloride 105 98 - 107 mmol/L    Total CO2 26.6 22.0 - 29.0 mmol/L    Calcium 9.8 8.6 - 10.5 mg/dL             The following portions of the patient's history were reviewed and updated as appropriate: allergies, current medications, past family history, past medical history, past social history, past surgical history and problem list.    Review of Systems   Constitutional: Negative for activity change, appetite change and unexpected weight change.   Eyes: Negative for visual disturbance.   Respiratory: Negative for chest tightness and shortness of breath.    Cardiovascular: Negative for chest pain and palpitations.   Skin: Negative for color change.   Neurological: Negative for syncope and speech difficulty.   Psychiatric/Behavioral: Negative for confusion and decreased concentration.       Objective   Physical Exam   Constitutional: She is oriented to person, place, and time. She appears well-developed and well-nourished.   HENT:   Mouth/Throat: Oropharynx is clear and moist.   Eyes: Pupils are equal, round, and reactive to light.   Cardiovascular: Normal rate and regular rhythm.   Pulmonary/Chest: Effort normal and breath sounds normal.   Abdominal: Soft. Bowel sounds are normal.   Neurological: She is alert and oriented to person, place, and time.   Psychiatric: She has a normal mood and affect. Her behavior is normal. "   Nursing note and vitals reviewed.      Assessment/Plan   Orly was seen today for headache.    Diagnoses and all orders for this visit:    Headache, unspecified headache type  -     butalbital-acetaminophen-caffeine-codeine (FIORICET WITH CODEINE) -59-30 MG per capsule; Take 1 capsule by mouth Daily As Needed for Headache.

## 2019-01-01 ENCOUNTER — RESULTS ENCOUNTER (OUTPATIENT)
Dept: FAMILY MEDICINE CLINIC | Facility: CLINIC | Age: 69
End: 2019-01-01

## 2019-01-01 DIAGNOSIS — M81.0 OSTEOPOROSIS, POST-MENOPAUSAL: ICD-10-CM

## 2019-01-23 LAB
AMBIG ABBREV BMP8 DEFAULT: NORMAL
BUN SERPL-MCNC: 14 MG/DL (ref 8–23)
BUN/CREAT SERPL: 19.7 (ref 7–25)
CALCIUM SERPL-MCNC: 9.6 MG/DL (ref 8.6–10.5)
CHLORIDE SERPL-SCNC: 104 MMOL/L (ref 98–107)
CO2 SERPL-SCNC: 28.4 MMOL/L (ref 22–29)
CREAT SERPL-MCNC: 0.71 MG/DL (ref 0.57–1)
GLUCOSE SERPL-MCNC: 93 MG/DL (ref 65–99)
POTASSIUM SERPL-SCNC: 4.3 MMOL/L (ref 3.5–5.2)
SODIUM SERPL-SCNC: 143 MMOL/L (ref 136–145)

## 2019-01-25 ENCOUNTER — INFUSION (OUTPATIENT)
Dept: ONCOLOGY | Facility: HOSPITAL | Age: 69
End: 2019-01-25

## 2019-01-25 VITALS
WEIGHT: 107.4 LBS | BODY MASS INDEX: 19.77 KG/M2 | RESPIRATION RATE: 18 BRPM | DIASTOLIC BLOOD PRESSURE: 70 MMHG | TEMPERATURE: 98.5 F | OXYGEN SATURATION: 97 % | HEIGHT: 62 IN | HEART RATE: 67 BPM | SYSTOLIC BLOOD PRESSURE: 115 MMHG

## 2019-01-25 DIAGNOSIS — M81.0 OSTEOPOROSIS, POST-MENOPAUSAL: Primary | ICD-10-CM

## 2019-01-25 PROCEDURE — 25010000002 DENOSUMAB 60 MG/ML SOLUTION: Performed by: FAMILY MEDICINE

## 2019-01-25 PROCEDURE — 96372 THER/PROPH/DIAG INJ SC/IM: CPT | Performed by: NURSE PRACTITIONER

## 2019-01-25 RX ADMIN — DENOSUMAB 60 MG: 60 INJECTION SUBCUTANEOUS at 13:39

## 2019-01-25 NOTE — PROGRESS NOTES
Arrived  for prolia injection. Indication and side effects reviewed. Denies recent dental work. Labs and medications verified. Prolia administered in right  arm without incidence. Instructed to call prescribing MD for any concerns or questions and instructed on how to schedule future appts.  Pt vu and discharged ambulatory.

## 2019-02-13 ENCOUNTER — OFFICE VISIT (OUTPATIENT)
Dept: FAMILY MEDICINE CLINIC | Facility: CLINIC | Age: 69
End: 2019-02-13

## 2019-02-13 VITALS
SYSTOLIC BLOOD PRESSURE: 115 MMHG | RESPIRATION RATE: 16 BRPM | BODY MASS INDEX: 19.51 KG/M2 | HEART RATE: 72 BPM | HEIGHT: 62 IN | TEMPERATURE: 97.7 F | DIASTOLIC BLOOD PRESSURE: 67 MMHG | WEIGHT: 106 LBS

## 2019-02-13 DIAGNOSIS — R51.9 HEADACHE, UNSPECIFIED HEADACHE TYPE: ICD-10-CM

## 2019-02-13 DIAGNOSIS — R51.9 NONINTRACTABLE EPISODIC HEADACHE, UNSPECIFIED HEADACHE TYPE: Primary | ICD-10-CM

## 2019-02-13 PROCEDURE — 99214 OFFICE O/P EST MOD 30 MIN: CPT | Performed by: FAMILY MEDICINE

## 2019-02-13 RX ORDER — BUTALBITAL, ACETAMINOPHEN, CAFFEINE AND CODEINE PHOSPHATE 50; 325; 40; 30 MG/1; MG/1; MG/1; MG/1
1 CAPSULE ORAL DAILY PRN
Qty: 45 CAPSULE | Refills: 0 | Status: SHIPPED | OUTPATIENT
Start: 2019-02-13 | End: 2019-05-07 | Stop reason: SDUPTHER

## 2019-02-13 NOTE — PATIENT INSTRUCTIONS
Patients must be seen every 3 months for their presription medication review and refill required by [place] law.  Patient has been advised on the potential for addiction  and dependence to their prescribed scheduled medication.  NEVIN was obtained today and reviewed. This was scanned into the patient's chart.

## 2019-02-13 NOTE — PROGRESS NOTES
Subjective   Chief Complaint:   Chief Complaint   Patient presents with   • Headache         History of Present Illness she comes in today to discuss her refill of her butalbital/Tylenol/caffeine codeine are basically Fioricet with codeine.  So she takes 1 a day as needed migraine headaches and she gets 45 pills per 3 months now and that last her the last time for 3 months on a refill the same thing.  So we are going to do Fioricet with codeine 1 daily as needed headache and 45 capsules.  And then she will come back in 3 months and will make sure that would last her it did this time and 4 pills were left over.  To better get a bone densitometer and she will be due May 2019 and she is getting her Prolia shots every 6 months.  Her Maxalt is used as needed for migraine headaches.            Orly WALL Rob 68 y.o. female who presents today for Medical Management of the below listed issues and medication refills.    ICD-10-CM ICD-9-CM   1. Nonintractable episodic headache, unspecified headache type R51 784.0   2. Headache, unspecified headache type R51 784.0        she has a problem list of   Patient Active Problem List   Diagnosis   • Headache   • Osteoporosis, post-menopausal   .  Since the last visit, she has overall felt well.  she has been compliant with   Current Outpatient Medications:   •  aspirin 81 MG tablet, Take 81 mg by mouth daily., Disp: , Rfl:   •  butalbital-acetaminophen-caffeine-codeine (FIORICET WITH CODEINE) -91-30 MG per capsule, Take 1 capsule by mouth Daily As Needed for Headache., Disp: 45 capsule, Rfl: 0  •  diphenhydrAMINE (BENADRYL) 25 mg capsule, Take 25 mg by mouth every 6 (six) hours as needed for itching., Disp: , Rfl:   •  Loratadine (ALLERGY RELIEF CHILD PO), Take  by mouth., Disp: , Rfl:   •  naproxen (NAPROSYN) 375 MG tablet, Take 1 tablet by mouth 2 (Two) Times a Day As Needed for mild pain (1-3)., Disp: 90 tablet, Rfl: 1  •  rizatriptan MLT (MAXALT-MLT) 10 MG disintegrating  tablet, Take 1 tablet by mouth 1 (One) Time As Needed for Migraine for up to 1 dose. May repeat in 2 hours if needed, Disp: 27 tablet, Rfl: 1.  she denies medication side effects.    All of the chronic condition(s) listed above are stable w/o issues.    There were no vitals taken for this visit.        The following portions of the patient's history were reviewed and updated as appropriate: allergies, current medications, past family history, past medical history, past social history, past surgical history and problem list.    Review of Systems   Constitutional: Negative for activity change, appetite change and unexpected weight change.   Eyes: Negative for visual disturbance.   Respiratory: Negative for chest tightness and shortness of breath.    Cardiovascular: Negative for chest pain and palpitations.   Skin: Negative for color change.   Neurological: Negative for syncope and speech difficulty.   Psychiatric/Behavioral: Negative for confusion and decreased concentration.       Objective   Physical Exam   Constitutional: She is oriented to person, place, and time. She appears well-developed and well-nourished.   HENT:   Mouth/Throat: Oropharynx is clear and moist.   Eyes: Pupils are equal, round, and reactive to light.   Cardiovascular: Normal rate and regular rhythm.   Pulmonary/Chest: Effort normal and breath sounds normal.   Abdominal: Soft. Bowel sounds are normal.   Neurological: She is alert and oriented to person, place, and time.   Psychiatric: She has a normal mood and affect. Her behavior is normal.   Nursing note and vitals reviewed.      Assessment/Plan   Orly was seen today for headache.    Diagnoses and all orders for this visit:    Nonintractable episodic headache, unspecified headache type    Headache, unspecified headache type

## 2019-03-06 ENCOUNTER — RESULTS ENCOUNTER (OUTPATIENT)
Dept: FAMILY MEDICINE CLINIC | Facility: CLINIC | Age: 69
End: 2019-03-06

## 2019-03-06 DIAGNOSIS — R51.9 NONINTRACTABLE EPISODIC HEADACHE, UNSPECIFIED HEADACHE TYPE: ICD-10-CM

## 2019-03-06 DIAGNOSIS — R51.9 HEADACHE, UNSPECIFIED HEADACHE TYPE: ICD-10-CM

## 2019-05-04 LAB
ALBUMIN SERPL-MCNC: 4.4 G/DL (ref 3.5–5.2)
ALBUMIN/GLOB SERPL: 1.7 G/DL
ALP SERPL-CCNC: 42 U/L (ref 39–117)
ALT SERPL-CCNC: 14 U/L (ref 1–33)
AST SERPL-CCNC: 18 U/L (ref 1–32)
BILIRUB SERPL-MCNC: 0.4 MG/DL (ref 0.2–1.2)
BUN SERPL-MCNC: 15 MG/DL (ref 8–23)
BUN/CREAT SERPL: 18.3 (ref 7–25)
CALCIUM SERPL-MCNC: 9.9 MG/DL (ref 8.6–10.5)
CHLORIDE SERPL-SCNC: 103 MMOL/L (ref 98–107)
CO2 SERPL-SCNC: 27.9 MMOL/L (ref 22–29)
CREAT SERPL-MCNC: 0.82 MG/DL (ref 0.57–1)
GLOBULIN SER CALC-MCNC: 2.6 GM/DL
GLUCOSE SERPL-MCNC: 95 MG/DL (ref 65–99)
POTASSIUM SERPL-SCNC: 4.2 MMOL/L (ref 3.5–5.2)
PROT SERPL-MCNC: 7 G/DL (ref 6–8.5)
SODIUM SERPL-SCNC: 142 MMOL/L (ref 136–145)

## 2019-05-07 ENCOUNTER — OFFICE VISIT (OUTPATIENT)
Dept: FAMILY MEDICINE CLINIC | Facility: CLINIC | Age: 69
End: 2019-05-07

## 2019-05-07 VITALS
DIASTOLIC BLOOD PRESSURE: 65 MMHG | SYSTOLIC BLOOD PRESSURE: 104 MMHG | BODY MASS INDEX: 19.32 KG/M2 | RESPIRATION RATE: 16 BRPM | TEMPERATURE: 98 F | WEIGHT: 105 LBS | OXYGEN SATURATION: 98 % | HEIGHT: 62 IN | HEART RATE: 54 BPM

## 2019-05-07 DIAGNOSIS — R51.9 HEADACHE, UNSPECIFIED HEADACHE TYPE: ICD-10-CM

## 2019-05-07 PROCEDURE — 99213 OFFICE O/P EST LOW 20 MIN: CPT | Performed by: FAMILY MEDICINE

## 2019-05-07 RX ORDER — BUTALBITAL, ACETAMINOPHEN, CAFFEINE AND CODEINE PHOSPHATE 50; 325; 40; 30 MG/1; MG/1; MG/1; MG/1
1 CAPSULE ORAL DAILY PRN
Qty: 45 CAPSULE | Refills: 0 | Status: SHIPPED | OUTPATIENT
Start: 2019-05-07 | End: 2019-07-24 | Stop reason: SDUPTHER

## 2019-05-07 NOTE — PATIENT INSTRUCTIONS
Exercise 30 minutes most days of the week  Sleep 6-8 hours each night if possible  Low fat, low cholesterol diet   we discussed prescribed medications and how to take them   make sure you get results of any labs/studies ordered today  Low glycemic index diet      Patients must be seen every 3 months for their presription medication review and refill required by [place] law.  Patient has been advised on the potential for addiction  and dependence to their prescribed scheduled medication.  NEVIN was obtained today and reviewed. This was scanned into the patient's chart.

## 2019-05-07 NOTE — PROGRESS NOTES
Subjective   Chief Complaint:   Chief Complaint   Patient presents with   • Headache         History of Present Illness     The patient has read and signed the [facility] Controlled Substance Contract.  I will continue to see patient for regular follow up appointments.  They are well controlled on their medication.  NEVIN has been reviewed by me and is updated every 3 months. The patient is aware of the potential for addiction and dependence.  She comes in today to refill her Fioricet with codeine for migraine headaches she gets 45 with no refills and this last her 90 days and along with the hot which she uses as needed and these 2 medicines together take care of her migraine headaches.  And she does fine on this.  Viewed her labs and they are good              Orly Rivas 68 y.o. female who presents today for Medical Management of the below listed issues and medication refills.    ICD-10-CM ICD-9-CM   1. Headache, unspecified headache type R51 784.0        she has a problem list of   Patient Active Problem List   Diagnosis   • Headache   • Osteoporosis, post-menopausal   .  Since the last visit, she has overall felt well.  she has been compliant with   Current Outpatient Medications:   •  aspirin 81 MG tablet, Take 81 mg by mouth daily., Disp: , Rfl:   •  butalbital-acetaminophen-caffeine-codeine (FIORICET WITH CODEINE) -85-30 MG per capsule, Take 1 capsule by mouth Daily As Needed for Headache., Disp: 45 capsule, Rfl: 0  •  diphenhydrAMINE (BENADRYL) 25 mg capsule, Take 25 mg by mouth every 6 (six) hours as needed for itching., Disp: , Rfl:   •  Loratadine (ALLERGY RELIEF CHILD PO), Take  by mouth., Disp: , Rfl:   •  naproxen (NAPROSYN) 375 MG tablet, Take 1 tablet by mouth 2 (Two) Times a Day As Needed for mild pain (1-3)., Disp: 90 tablet, Rfl: 1  •  rizatriptan MLT (MAXALT-MLT) 10 MG disintegrating tablet, Take 1 tablet by mouth 1 (One) Time As Needed for Migraine for up to 1 dose. May repeat in  "2 hours if needed, Disp: 27 tablet, Rfl: 1.  she denies medication side effects.    All of the chronic condition(s) listed above are stable w/o issues.    /65   Pulse 54   Temp 98 °F (36.7 °C)   Resp 16   Ht 157.5 cm (62.01\")   Wt 47.6 kg (105 lb)   SpO2 98%   BMI 19.20 kg/m²     Results for orders placed or performed in visit on 03/06/19   Comprehensive Metabolic Panel   Result Value Ref Range    Glucose 95 65 - 99 mg/dL    BUN 15 8 - 23 mg/dL    Creatinine 0.82 0.57 - 1.00 mg/dL    eGFR Non African Am 70 >60 mL/min/1.73    eGFR African Am 84 >60 mL/min/1.73    BUN/Creatinine Ratio 18.3 7.0 - 25.0    Sodium 142 136 - 145 mmol/L    Potassium 4.2 3.5 - 5.2 mmol/L    Chloride 103 98 - 107 mmol/L    Total CO2 27.9 22.0 - 29.0 mmol/L    Calcium 9.9 8.6 - 10.5 mg/dL    Total Protein 7.0 6.0 - 8.5 g/dL    Albumin 4.40 3.50 - 5.20 g/dL    Globulin 2.6 gm/dL    A/G Ratio 1.7 g/dL    Total Bilirubin 0.4 0.2 - 1.2 mg/dL    Alkaline Phosphatase 42 39 - 117 U/L    AST (SGOT) 18 1 - 32 U/L    ALT (SGPT) 14 1 - 33 U/L             The following portions of the patient's history were reviewed and updated as appropriate: allergies, current medications, past family history, past medical history, past social history, past surgical history and problem list.    Review of Systems   Constitutional: Negative for activity change, appetite change and unexpected weight change.   Eyes: Negative for visual disturbance.   Respiratory: Negative for chest tightness and shortness of breath.    Cardiovascular: Negative for chest pain and palpitations.   Skin: Negative for color change.   Neurological: Negative for syncope and speech difficulty.   Psychiatric/Behavioral: Negative for confusion and decreased concentration.       Objective   Physical Exam   Constitutional: She is oriented to person, place, and time. She appears well-developed and well-nourished.   Eyes: Pupils are equal, round, and reactive to light.   Cardiovascular: " Normal rate and regular rhythm.   Pulmonary/Chest: Effort normal and breath sounds normal.   Abdominal: Soft. Bowel sounds are normal.   Neurological: She is alert and oriented to person, place, and time.   Psychiatric: She has a normal mood and affect. Her behavior is normal.   Nursing note and vitals reviewed.      Assessment/Plan   Orly was seen today for headache.    Diagnoses and all orders for this visit:    Headache, unspecified headache type  -     butalbital-acetaminophen-caffeine-codeine (FIORICET WITH CODEINE) -32-30 MG per capsule; Take 1 capsule by mouth Daily As Needed for Headache.

## 2019-07-24 ENCOUNTER — OFFICE VISIT (OUTPATIENT)
Dept: FAMILY MEDICINE CLINIC | Facility: CLINIC | Age: 69
End: 2019-07-24

## 2019-07-24 VITALS
SYSTOLIC BLOOD PRESSURE: 109 MMHG | RESPIRATION RATE: 16 BRPM | OXYGEN SATURATION: 98 % | HEIGHT: 62 IN | BODY MASS INDEX: 19.32 KG/M2 | TEMPERATURE: 98.5 F | WEIGHT: 105 LBS | DIASTOLIC BLOOD PRESSURE: 62 MMHG

## 2019-07-24 DIAGNOSIS — M81.0 OSTEOPOROSIS, POST-MENOPAUSAL: Primary | ICD-10-CM

## 2019-07-24 DIAGNOSIS — R51.9 HEADACHE, UNSPECIFIED HEADACHE TYPE: ICD-10-CM

## 2019-07-24 PROCEDURE — 99213 OFFICE O/P EST LOW 20 MIN: CPT | Performed by: FAMILY MEDICINE

## 2019-07-24 RX ORDER — BUTALBITAL, ACETAMINOPHEN, CAFFEINE AND CODEINE PHOSPHATE 50; 325; 40; 30 MG/1; MG/1; MG/1; MG/1
1 CAPSULE ORAL DAILY PRN
Qty: 45 CAPSULE | Refills: 0 | Status: SHIPPED | OUTPATIENT
Start: 2019-07-24 | End: 2019-10-08 | Stop reason: SDUPTHER

## 2019-07-24 NOTE — PROGRESS NOTES
Subjective   Chief Complaint:   Chief Complaint   Patient presents with   • Headache         History of Present Illness     The patient has read and signed the [facility] Controlled Substance Contract.  I will continue to see patient for regular follow up appointments.  They are well controlled on their medication.  NVEIN has been reviewed by me and is updated every 3 months. The patient is aware of the potential for addiction and dependence.  Patient is here to refill her Fioricet with codeine she gets 45 capsules and that Fioricet with codeine -09-30 mg/caps.            Orly Rivas 68 y.o. female who presents for an   Evaluation immunization.  Pt does not want shots  Such as shingles and pnuemonia.     she has a history of   Patient Active Problem List   Diagnosis   • Headache   • Osteoporosis, post-menopausal   .  Labs results discussed in detail with the patient, if no recent labs were done, order placed today.  Plan to update vaccines if needed today.  I  reviewed health maintenance with her as part of my preventative care plan.  Orly Rivas 68 y.o. female who presents today for Medical Management of the below listed issues and medication refills.    ICD-10-CM ICD-9-CM   1. Headache, unspecified headache type R51 784.0        she has a problem list of   Patient Active Problem List   Diagnosis   • Headache   • Osteoporosis, post-menopausal   .  Since the last visit, she has overall felt well.  she has been compliant with   Current Outpatient Medications:   •  aspirin 81 MG tablet, Take 81 mg by mouth daily., Disp: , Rfl:   •  butalbital-acetaminophen-caffeine-codeine (FIORICET WITH CODEINE) -19-30 MG per capsule, Take 1 capsule by mouth Daily As Needed for Headache., Disp: 45 capsule, Rfl: 0  •  diphenhydrAMINE (BENADRYL) 25 mg capsule, Take 25 mg by mouth every 6 (six) hours as needed for itching., Disp: , Rfl:   •  Loratadine (ALLERGY RELIEF CHILD PO), Take  by mouth., Disp: , Rfl:  "  •  naproxen (NAPROSYN) 375 MG tablet, Take 1 tablet by mouth 2 (Two) Times a Day As Needed for mild pain (1-3)., Disp: 90 tablet, Rfl: 1  •  rizatriptan MLT (MAXALT-MLT) 10 MG disintegrating tablet, Take 1 tablet by mouth 1 (One) Time As Needed for Migraine for up to 1 dose. May repeat in 2 hours if needed, Disp: 27 tablet, Rfl: 1.  she denies medication side effects.    All of the chronic condition(s) listed above are stable w/o issues.    /62   Temp 98.5 °F (36.9 °C)   Resp 16   Ht 157.5 cm (62.01\")   Wt 47.6 kg (105 lb)   SpO2 98%   BMI 19.20 kg/m²     Results for orders placed or performed in visit on 03/06/19   Comprehensive Metabolic Panel   Result Value Ref Range    Glucose 95 65 - 99 mg/dL    BUN 15 8 - 23 mg/dL    Creatinine 0.82 0.57 - 1.00 mg/dL    eGFR Non African Am 70 >60 mL/min/1.73    eGFR African Am 84 >60 mL/min/1.73    BUN/Creatinine Ratio 18.3 7.0 - 25.0    Sodium 142 136 - 145 mmol/L    Potassium 4.2 3.5 - 5.2 mmol/L    Chloride 103 98 - 107 mmol/L    Total CO2 27.9 22.0 - 29.0 mmol/L    Calcium 9.9 8.6 - 10.5 mg/dL    Total Protein 7.0 6.0 - 8.5 g/dL    Albumin 4.40 3.50 - 5.20 g/dL    Globulin 2.6 gm/dL    A/G Ratio 1.7 g/dL    Total Bilirubin 0.4 0.2 - 1.2 mg/dL    Alkaline Phosphatase 42 39 - 117 U/L    AST (SGOT) 18 1 - 32 U/L    ALT (SGPT) 14 1 - 33 U/L             The following portions of the patient's history were reviewed and updated as appropriate: allergies, current medications, past family history, past medical history, past social history, past surgical history and problem list.    Review of Systems   Constitutional: Negative for activity change, appetite change and unexpected weight change.   Eyes: Negative for visual disturbance.   Respiratory: Negative for chest tightness and shortness of breath.    Cardiovascular: Negative for chest pain and palpitations.   Skin: Negative for color change.   Neurological: Negative for syncope and speech difficulty. "   Psychiatric/Behavioral: Negative for confusion and decreased concentration.       Objective   Physical Exam   Constitutional: She is oriented to person, place, and time. She appears well-developed and well-nourished.   HENT:   Right Ear: External ear normal.   Left Ear: External ear normal.   Mouth/Throat: Oropharynx is clear and moist.   Eyes: Pupils are equal, round, and reactive to light.   Neck: Normal range of motion. No thyromegaly present.   Cardiovascular: Normal rate and regular rhythm.   Pulmonary/Chest: Effort normal and breath sounds normal.   Abdominal: Soft. Bowel sounds are normal.   Lymphadenopathy:     She has no cervical adenopathy.   Neurological: She is alert and oriented to person, place, and time. No cranial nerve deficit or sensory deficit. Coordination normal.   Psychiatric: She has a normal mood and affect. Her behavior is normal.   Nursing note and vitals reviewed.      Assessment/Plan   Orly was seen today for headache.    Diagnoses and all orders for this visit:    Headache, unspecified headache type  -     butalbital-acetaminophen-caffeine-codeine (FIORICET WITH CODEINE) -63-30 MG per capsule; Take 1 capsule by mouth Daily As Needed for Headache.

## 2019-08-13 DIAGNOSIS — Z12.39 SCREENING FOR BREAST CANCER: ICD-10-CM

## 2019-08-13 DIAGNOSIS — M81.0 OSTEOPOROSIS, POST-MENOPAUSAL: Primary | ICD-10-CM

## 2019-08-29 LAB
BUN SERPL-MCNC: 13 MG/DL (ref 8–23)
BUN/CREAT SERPL: 18.1 (ref 7–25)
CALCIUM SERPL-MCNC: 9.6 MG/DL (ref 8.6–10.5)
CHLORIDE SERPL-SCNC: 103 MMOL/L (ref 98–107)
CO2 SERPL-SCNC: 29.1 MMOL/L (ref 22–29)
CREAT SERPL-MCNC: 0.72 MG/DL (ref 0.57–1)
GLUCOSE SERPL-MCNC: 97 MG/DL (ref 65–99)
POTASSIUM SERPL-SCNC: 4.4 MMOL/L (ref 3.5–5.2)
SODIUM SERPL-SCNC: 142 MMOL/L (ref 136–145)

## 2019-08-30 ENCOUNTER — APPOINTMENT (OUTPATIENT)
Dept: WOMENS IMAGING | Facility: HOSPITAL | Age: 69
End: 2019-08-30

## 2019-08-30 PROCEDURE — 77067 SCR MAMMO BI INCL CAD: CPT | Performed by: RADIOLOGY

## 2019-08-30 PROCEDURE — 77063 BREAST TOMOSYNTHESIS BI: CPT | Performed by: RADIOLOGY

## 2019-08-30 PROCEDURE — 77080 DXA BONE DENSITY AXIAL: CPT | Performed by: RADIOLOGY

## 2019-09-03 ENCOUNTER — INFUSION (OUTPATIENT)
Dept: ONCOLOGY | Facility: HOSPITAL | Age: 69
End: 2019-09-03

## 2019-09-03 VITALS
BODY MASS INDEX: 19.31 KG/M2 | TEMPERATURE: 98.5 F | DIASTOLIC BLOOD PRESSURE: 68 MMHG | HEART RATE: 71 BPM | SYSTOLIC BLOOD PRESSURE: 111 MMHG | WEIGHT: 105.6 LBS | OXYGEN SATURATION: 96 %

## 2019-09-03 DIAGNOSIS — M81.0 OSTEOPOROSIS, POST-MENOPAUSAL: Primary | ICD-10-CM

## 2019-09-03 PROCEDURE — 96372 THER/PROPH/DIAG INJ SC/IM: CPT | Performed by: NURSE PRACTITIONER

## 2019-09-03 PROCEDURE — 25010000002 DENOSUMAB 60 MG/ML SOLUTION PREFILLED SYRINGE: Performed by: FAMILY MEDICINE

## 2019-09-03 RX ADMIN — DENOSUMAB 60 MG: 60 INJECTION SUBCUTANEOUS at 13:47

## 2019-09-03 NOTE — PROGRESS NOTES
Arrived ambulatory for prolia injection. Indication and side effects reviewed. Denies recent dental work. Labs and medications verified. Prolia administered in left arm without incidence. Instructed to call prescribing MD for any concerns or questions and instructed on how to schedule future appts.  Pt vu and discharged ambulatory.

## 2019-09-28 NOTE — PROGRESS NOTES
Patient had a mammogram and a bone densitometer.  The mammogram was negative.  The bone densitometer showed osteoporosis she needs an appointment to go over the mammogram and the bone densitometer in the office call her and tell her to make an appointment

## 2019-10-08 ENCOUNTER — OFFICE VISIT (OUTPATIENT)
Dept: FAMILY MEDICINE CLINIC | Facility: CLINIC | Age: 69
End: 2019-10-08

## 2019-10-08 VITALS
WEIGHT: 106 LBS | TEMPERATURE: 98.2 F | SYSTOLIC BLOOD PRESSURE: 109 MMHG | OXYGEN SATURATION: 99 % | HEART RATE: 62 BPM | HEIGHT: 62 IN | RESPIRATION RATE: 16 BRPM | DIASTOLIC BLOOD PRESSURE: 64 MMHG | BODY MASS INDEX: 19.51 KG/M2

## 2019-10-08 DIAGNOSIS — R51.9 HEADACHE, UNSPECIFIED HEADACHE TYPE: ICD-10-CM

## 2019-10-08 PROCEDURE — 99213 OFFICE O/P EST LOW 20 MIN: CPT | Performed by: FAMILY MEDICINE

## 2019-10-08 RX ORDER — BUTALBITAL, ACETAMINOPHEN, CAFFEINE AND CODEINE PHOSPHATE 50; 325; 40; 30 MG/1; MG/1; MG/1; MG/1
1 CAPSULE ORAL DAILY PRN
Qty: 45 CAPSULE | Refills: 0 | Status: SHIPPED | OUTPATIENT
Start: 2019-10-08 | End: 2020-01-03 | Stop reason: SDUPTHER

## 2019-10-08 RX ORDER — NAPROXEN 375 MG/1
375 TABLET ORAL 2 TIMES DAILY PRN
Qty: 90 TABLET | Refills: 1 | Status: SHIPPED | OUTPATIENT
Start: 2019-10-08 | End: 2022-02-07 | Stop reason: SDUPTHER

## 2019-10-08 NOTE — PROGRESS NOTES
Subjective   Chief Complaint:   Chief Complaint   Patient presents with   • Headache   • Pain in left foot         History of Present Illness     The patient has read and signed the [facility] Controlled Substance Contract.  I will continue to see patient for regular follow up appointments.  They are well controlled on their medication.  NEVIN has been reviewed by me and is updated every 3 months. The patient is aware of the potential for addiction and dependence.  Patient comes in today to discuss her headache meds and I will get a refill Naprosyn 375 mg take 1 tablet 2 times a day as needed for pain I am to give her 90 with a refill and I will get a refill butalbital Tylenol caffeine codeine which is Fioricet with codeine take 1 capsule by mouth daily as needed for headache and will give her 45 with no refills gets a migraine headache very sparingly x1 every other day.  And the medicines that I just said last her 90 days.  Also had her bone densitometer which are reviewed with her and injury to her foot 2 weeks ago I told her she will need to reschedule out and she wants to she can reschedule I will x-ray her foot but is been 2 weeks and it looks like she can walk on okay          Orly MAE Rob 68 y.o. female who presents today for Medical Management of the below listed issues and medication refills.    ICD-10-CM ICD-9-CM   1. Headache, unspecified headache type R51 784.0        she has a problem list of   Patient Active Problem List   Diagnosis   • Headache   • Osteoporosis, post-menopausal   .    she has been compliant with   Current Outpatient Medications:   •  aspirin 81 MG tablet, Take 81 mg by mouth daily., Disp: , Rfl:   •  butalbital-acetaminophen-caffeine-codeine (FIORICET WITH CODEINE) -32-30 MG per capsule, Take 1 capsule by mouth Daily As Needed for Headache., Disp: 45 capsule, Rfl: 0  •  Loratadine (ALLERGY RELIEF CHILD PO), Take  by mouth., Disp: , Rfl:   •  naproxen (NAPROSYN) 375 MG  "tablet, Take 1 tablet by mouth 2 (Two) Times a Day As Needed for mild pain (1-3)., Disp: 90 tablet, Rfl: 1  •  rizatriptan MLT (MAXALT-MLT) 10 MG disintegrating tablet, Take 1 tablet by mouth 1 (One) Time As Needed for Migraine for up to 1 dose. May repeat in 2 hours if needed, Disp: 27 tablet, Rfl: 1  •  diphenhydrAMINE (BENADRYL) 25 mg capsule, Take 25 mg by mouth every 6 (six) hours as needed for itching., Disp: , Rfl: .  she denies medication side effects.        /64   Pulse 62   Temp 98.2 °F (36.8 °C)   Resp 16   Ht 157.5 cm (62.01\")   Wt 48.1 kg (106 lb)   SpO2 99%   BMI 19.38 kg/m²     Results for orders placed or performed in visit on 07/24/19   Basic Metabolic Panel   Result Value Ref Range    Glucose 97 65 - 99 mg/dL    BUN 13 8 - 23 mg/dL    Creatinine 0.72 0.57 - 1.00 mg/dL    eGFR Non African Am 81 >60 mL/min/1.73    eGFR African Am 98 >60 mL/min/1.73    BUN/Creatinine Ratio 18.1 7.0 - 25.0    Sodium 142 136 - 145 mmol/L    Potassium 4.4 3.5 - 5.2 mmol/L    Chloride 103 98 - 107 mmol/L    Total CO2 29.1 (H) 22.0 - 29.0 mmol/L    Calcium 9.6 8.6 - 10.5 mg/dL             The following portions of the patient's history were reviewed and updated as appropriate: allergies, current medications, past family history, past medical history, past social history, past surgical history and problem list.    Review of Systems   Constitutional: Negative for activity change, appetite change and unexpected weight change.   Eyes: Negative for visual disturbance.   Respiratory: Negative for chest tightness and shortness of breath.    Cardiovascular: Negative for chest pain and palpitations.   Skin: Negative for color change.   Neurological: Negative for syncope and speech difficulty.   Psychiatric/Behavioral: Negative for confusion and decreased concentration.       Objective   Physical Exam   Constitutional: She is oriented to person, place, and time. She appears well-developed and well-nourished.   HENT: "   Right Ear: External ear normal.   Left Ear: External ear normal.   Mouth/Throat: Oropharynx is clear and moist.   Eyes: Pupils are equal, round, and reactive to light.   Cardiovascular: Normal rate and regular rhythm.   Pulmonary/Chest: Effort normal and breath sounds normal.   Abdominal: Soft. Bowel sounds are normal.   Musculoskeletal:   2 weeks ago sprained left foot sprain toe all looks healing okay I told her she will need to reschedule for me to x-ray    Neurological: She is alert and oriented to person, place, and time.   Psychiatric: She has a normal mood and affect. Her behavior is normal.   Nursing note and vitals reviewed.      Assessment/Plan   Orly was seen today for headache and pain in left foot.    Diagnoses and all orders for this visit:    Headache, unspecified headache type  -     butalbital-acetaminophen-caffeine-codeine (FIORICET WITH CODEINE) -19-30 MG per capsule; Take 1 capsule by mouth Daily As Needed for Headache.  -     naproxen (NAPROSYN) 375 MG tablet; Take 1 tablet by mouth 2 (Two) Times a Day As Needed for Mild Pain .                 -Labs results discussed in detail with the patient, if no recent labs were done, order placed today.  Plan to update vaccines if needed today.  I  reviewed health maintenance with the patient as part of my preventative care plan. I discussed preventative counseling with the patient in detail.

## 2019-10-08 NOTE — PATIENT INSTRUCTIONS
Exercise 30 minutes most days of the week  Sleep 6-8 hours each night if possible  Low fat, low cholesterol diet   we discussed prescribed medications and how to take them   make sure you get results of any labs/studies ordered today  Low glycemic index diet      Patients must be seen every 3 months for their presription medication review and refill required by [place] law.  Patient has been advised on the potential for addiction  and dependence to their prescribed scheduled medication.  NEVIN was obtained today and reviewed. This was scanned into the patient's chart.  Labs results discussed in detail with the patient, if no recent labs were done, order placed today.  Plan to update vaccines if needed today.  I  reviewed health maintenance with the patient as part of my preventative care plan. I discussed preventative counseling with the patient in detail.

## 2020-01-03 ENCOUNTER — OFFICE VISIT (OUTPATIENT)
Dept: FAMILY MEDICINE CLINIC | Facility: CLINIC | Age: 70
End: 2020-01-03

## 2020-01-03 VITALS
WEIGHT: 103 LBS | TEMPERATURE: 98.6 F | DIASTOLIC BLOOD PRESSURE: 72 MMHG | RESPIRATION RATE: 16 BRPM | HEART RATE: 62 BPM | HEIGHT: 62 IN | BODY MASS INDEX: 18.95 KG/M2 | OXYGEN SATURATION: 98 % | SYSTOLIC BLOOD PRESSURE: 111 MMHG

## 2020-01-03 DIAGNOSIS — R51.9 HEADACHE, UNSPECIFIED HEADACHE TYPE: ICD-10-CM

## 2020-01-03 PROCEDURE — 99213 OFFICE O/P EST LOW 20 MIN: CPT | Performed by: FAMILY MEDICINE

## 2020-01-03 RX ORDER — BUTALBITAL, ACETAMINOPHEN, CAFFEINE AND CODEINE PHOSPHATE 50; 325; 40; 30 MG/1; MG/1; MG/1; MG/1
1 CAPSULE ORAL DAILY PRN
Qty: 50 CAPSULE | Refills: 0 | Status: SHIPPED | OUTPATIENT
Start: 2020-01-03 | End: 2020-03-30 | Stop reason: SDUPTHER

## 2020-01-03 NOTE — PATIENT INSTRUCTIONS
If lab was order today please have them done.   Exercise 30 minutes most days of the week  Sleep 6-8 hours each night if possible  Low fat, low cholesterol diet   Low glycemic index diet

## 2020-01-03 NOTE — PROGRESS NOTES
"Subjective   Chief Complaint:   Chief Complaint   Patient presents with   • Headache   • Sinus Issues         History of Present Illness     The patient has read and signed the [facility] Controlled Substance Contract.  I will continue to see patient for regular follow up appointments.  They are well controlled on their medication.  NEVIN has been reviewed by me and is updated every 3 months. The patient is aware of the potential for addiction and dependence.  She has a typical migraine headache and the Fioricet helps her migraine headache and sometimes she relies on a Maxalt.              Orly Rivas 69 y.o. female who presents today for Medical Management of the below listed issues and medication refills.    ICD-10-CM ICD-9-CM   1. Headache, unspecified headache type R51 784.0        she has a problem list of   Patient Active Problem List   Diagnosis   • Headache   • Osteoporosis, post-menopausal   .    she has been compliant with   Current Outpatient Medications:   •  aspirin 81 MG tablet, Take 81 mg by mouth daily., Disp: , Rfl:   •  butalbital-acetaminophen-caffeine-codeine (FIORICET WITH CODEINE) -95-30 MG per capsule, Take 1 capsule by mouth Daily As Needed for Headache., Disp: 50 capsule, Rfl: 0  •  Loratadine (ALLERGY RELIEF CHILD PO), Take  by mouth., Disp: , Rfl:   •  naproxen (NAPROSYN) 375 MG tablet, Take 1 tablet by mouth 2 (Two) Times a Day As Needed for Mild Pain ., Disp: 90 tablet, Rfl: 1  •  rizatriptan MLT (MAXALT-MLT) 10 MG disintegrating tablet, Take 1 tablet by mouth 1 (One) Time As Needed for Migraine for up to 1 dose. May repeat in 2 hours if needed, Disp: 27 tablet, Rfl: 1.  she denies medication side effects.        /72   Pulse 62   Temp 98.6 °F (37 °C)   Resp 16   Ht 157.5 cm (62.01\")   Wt 46.7 kg (103 lb)   SpO2 98%   BMI 18.83 kg/m²     Results for orders placed or performed in visit on 07/24/19   Basic Metabolic Panel   Result Value Ref Range    Glucose 97 " 65 - 99 mg/dL    BUN 13 8 - 23 mg/dL    Creatinine 0.72 0.57 - 1.00 mg/dL    eGFR Non African Am 81 >60 mL/min/1.73    eGFR African Am 98 >60 mL/min/1.73    BUN/Creatinine Ratio 18.1 7.0 - 25.0    Sodium 142 136 - 145 mmol/L    Potassium 4.4 3.5 - 5.2 mmol/L    Chloride 103 98 - 107 mmol/L    Total CO2 29.1 (H) 22.0 - 29.0 mmol/L    Calcium 9.6 8.6 - 10.5 mg/dL       The following portions of the patient's history were reviewed and updated as appropriate: allergies, current medications, past family history, past medical history, past social history, past surgical history and problem list.      she has a history of   Patient Active Problem List   Diagnosis   • Headache   • Osteoporosis, post-menopausal       Review of Systems   Constitutional: Negative for activity change, appetite change and unexpected weight change.   Eyes: Negative for visual disturbance.   Respiratory: Negative for chest tightness and shortness of breath.    Cardiovascular: Negative for chest pain and palpitations.   Skin: Negative for color change.   Neurological: Negative for syncope and speech difficulty.   Psychiatric/Behavioral: Negative for confusion and decreased concentration.       Objective   Physical Exam   Constitutional: She is oriented to person, place, and time. She appears well-developed and well-nourished.   HENT:   Right Ear: External ear normal.   Left Ear: External ear normal.   Mouth/Throat: Oropharynx is clear and moist.   Eyes: Pupils are equal, round, and reactive to light.   Cardiovascular: Normal rate and regular rhythm.   Pulmonary/Chest: Effort normal and breath sounds normal.   Abdominal: Soft. Bowel sounds are normal.   Neurological: She is alert and oriented to person, place, and time.   Psychiatric: She has a normal mood and affect. Her behavior is normal.   Nursing note and vitals reviewed.      Assessment/Plan   Orly was seen today for headache and sinus issues.    Diagnoses and all orders for this  visit:    Headache, unspecified headache type  -     butalbital-acetaminophen-caffeine-codeine (FIORICET WITH CODEINE) -41-30 MG per capsule; Take 1 capsule by mouth Daily As Needed for Headache.     migraine headache

## 2020-02-20 ENCOUNTER — TELEPHONE (OUTPATIENT)
Dept: FAMILY MEDICINE CLINIC | Facility: CLINIC | Age: 70
End: 2020-02-20

## 2020-02-20 DIAGNOSIS — M81.0 OSTEOPOROSIS, POST-MENOPAUSAL: Primary | ICD-10-CM

## 2020-02-27 ENCOUNTER — RESULTS ENCOUNTER (OUTPATIENT)
Dept: FAMILY MEDICINE CLINIC | Facility: CLINIC | Age: 70
End: 2020-02-27

## 2020-02-27 DIAGNOSIS — M81.0 OSTEOPOROSIS, POST-MENOPAUSAL: ICD-10-CM

## 2020-02-29 LAB
BUN SERPL-MCNC: 15 MG/DL (ref 8–23)
BUN/CREAT SERPL: 19.5 (ref 7–25)
CALCIUM SERPL-MCNC: 9.3 MG/DL (ref 8.6–10.5)
CHLORIDE SERPL-SCNC: 104 MMOL/L (ref 98–107)
CO2 SERPL-SCNC: 28.9 MMOL/L (ref 22–29)
CREAT SERPL-MCNC: 0.77 MG/DL (ref 0.57–1)
GLUCOSE SERPL-MCNC: 93 MG/DL (ref 65–99)
POTASSIUM SERPL-SCNC: 4.3 MMOL/L (ref 3.5–5.2)
SODIUM SERPL-SCNC: 143 MMOL/L (ref 136–145)

## 2020-03-05 ENCOUNTER — INFUSION (OUTPATIENT)
Dept: ONCOLOGY | Facility: HOSPITAL | Age: 70
End: 2020-03-05

## 2020-03-05 VITALS
TEMPERATURE: 98.3 F | SYSTOLIC BLOOD PRESSURE: 122 MMHG | RESPIRATION RATE: 16 BRPM | WEIGHT: 107 LBS | BODY MASS INDEX: 19.56 KG/M2 | DIASTOLIC BLOOD PRESSURE: 72 MMHG | OXYGEN SATURATION: 98 % | HEART RATE: 68 BPM

## 2020-03-05 DIAGNOSIS — M81.0 OSTEOPOROSIS, POST-MENOPAUSAL: Primary | ICD-10-CM

## 2020-03-05 PROCEDURE — 96372 THER/PROPH/DIAG INJ SC/IM: CPT

## 2020-03-05 PROCEDURE — 25010000002 DENOSUMAB 60 MG/ML SOLUTION PREFILLED SYRINGE: Performed by: FAMILY MEDICINE

## 2020-03-05 RX ADMIN — DENOSUMAB 60 MG: 60 INJECTION SUBCUTANEOUS at 14:15

## 2020-03-30 ENCOUNTER — TELEMEDICINE (OUTPATIENT)
Dept: FAMILY MEDICINE CLINIC | Facility: CLINIC | Age: 70
End: 2020-03-30

## 2020-03-30 DIAGNOSIS — R51.9 HEADACHE, UNSPECIFIED HEADACHE TYPE: ICD-10-CM

## 2020-03-30 PROCEDURE — 99442 PR PHYS/QHP TELEPHONE EVALUATION 11-20 MIN: CPT | Performed by: FAMILY MEDICINE

## 2020-03-30 RX ORDER — BUTALBITAL, ACETAMINOPHEN, CAFFEINE AND CODEINE PHOSPHATE 50; 325; 40; 30 MG/1; MG/1; MG/1; MG/1
1 CAPSULE ORAL DAILY PRN
Qty: 50 CAPSULE | Refills: 1 | Status: SHIPPED | OUTPATIENT
Start: 2020-03-30 | End: 2020-10-02 | Stop reason: SDUPTHER

## 2020-04-02 NOTE — PROGRESS NOTES
You have chosen to receive care through a telephone visit today. Do you consent to use a telephone visit for your medical care today? Yes  I spent 15 minutes, [id number].    Patient with a history of migraines is complaining of a headache. She normally takes Naprosyn chronically. I prescribed her Fioricet with codeine to take as needed for headache. Patient has taken this in the past. I also reviewed her other medications today.

## 2020-10-02 ENCOUNTER — OFFICE VISIT (OUTPATIENT)
Dept: FAMILY MEDICINE CLINIC | Facility: CLINIC | Age: 70
End: 2020-10-02

## 2020-10-02 DIAGNOSIS — R51.9 HEADACHE, UNSPECIFIED HEADACHE TYPE: Primary | ICD-10-CM

## 2020-10-02 PROCEDURE — 99442 PR PHYS/QHP TELEPHONE EVALUATION 11-20 MIN: CPT | Performed by: FAMILY MEDICINE

## 2020-10-02 RX ORDER — BUTALBITAL, ACETAMINOPHEN, CAFFEINE AND CODEINE PHOSPHATE 50; 325; 40; 30 MG/1; MG/1; MG/1; MG/1
1 CAPSULE ORAL DAILY PRN
Qty: 50 CAPSULE | Refills: 1 | Status: SHIPPED | OUTPATIENT
Start: 2020-10-02 | End: 2020-10-02

## 2020-10-02 RX ORDER — BUTALBITAL, ACETAMINOPHEN, CAFFEINE AND CODEINE PHOSPHATE 50; 325; 40; 30 MG/1; MG/1; MG/1; MG/1
1 CAPSULE ORAL DAILY PRN
Qty: 50 CAPSULE | Refills: 1 | Status: SHIPPED | OUTPATIENT
Start: 2020-10-02 | End: 2020-10-02 | Stop reason: SDUPTHER

## 2020-10-02 NOTE — PROGRESS NOTES
You have chosen to receive care through a telephone visit. Do you consent to use a telephone visit for your medical care today? Yes see her med list. She needs refill  Of fioricet with codiene.  And  this was a  Phone visit. I had trouble  Refilling her medication    Because duo on phone not working.    She is doing fine on medication   fioricet with codiene   -65-30mg   Butalbital- acetaminophine-caffiene -codiene    1 po q day  prn migraine heaDAche. She taKE  1 PO Q DAY PRN HEADACHE     50 CAPSULES  WITH 1 REFILL  REFILLED   SHE HAS NO PROBLEMS WITH THIS AND MIGRAINE HEADACHE CONTROLLED. 12minutes    [id number]  I spent at least 60 minutes  Or greater because duo not working and  Had  Over  12 phone calls to epic and still not working.

## 2020-10-03 RX ORDER — BUTALBITAL, ACETAMINOPHEN, CAFFEINE AND CODEINE PHOSPHATE 50; 325; 40; 30 MG/1; MG/1; MG/1; MG/1
1 CAPSULE ORAL EVERY 6 HOURS PRN
Qty: 50 CAPSULE | Refills: 1 | Status: SHIPPED | OUTPATIENT
Start: 2020-10-03 | End: 2021-07-01 | Stop reason: SDUPTHER

## 2021-03-16 NOTE — PATIENT INSTRUCTIONS
Patients must be seen every 3 months for their presription medication review and refill required by [place] law.  Patient has been advised on the potential for addiction  and dependence to their prescribed scheduled medication.  NEVIN was obtained today and reviewed. This was scanned into the patient's chart.       Patient received to unit from PACU  Awake and alert  No complaints of pain  Resting comfortably   Vitals stable

## 2021-03-22 ENCOUNTER — BULK ORDERING (OUTPATIENT)
Dept: CASE MANAGEMENT | Facility: OTHER | Age: 71
End: 2021-03-22

## 2021-03-22 DIAGNOSIS — Z23 IMMUNIZATION DUE: ICD-10-CM

## 2021-07-01 DIAGNOSIS — R51.9 HEADACHE, UNSPECIFIED HEADACHE TYPE: ICD-10-CM

## 2021-07-01 NOTE — TELEPHONE ENCOUNTER
Caller: Orly Rivas    Relationship: Self    Best call back number: [phone]    Medication needed:   Requested Prescriptions     Pending Prescriptions Disp Refills   • butalbital-acetaminophen-caffeine-codeine (FIORICET WITH CODEINE) -76-30 MG per capsule 50 capsule 1     Sig: Take 1 capsule by mouth Every 6 (Six) Hours As Needed for Headache.   • rizatriptan MLT (Maxalt-MLT) 10 MG disintegrating tablet 27 tablet 1     Si tablet 1 (One) Time As Needed for Migraine for up to 1 dose. May repeat in 2 hours if needed       When do you need the refill by: ASAP     What additional details did the patient provide when requesting the medication: HAS ABOUT A WEEKS WORTH LEFT     Does the patient have less than a 3 day supply:  [] Yes  [x] No    What is the patient's preferred pharmacy: [facility] [place] [facility] [place], [address] [phone] [facility] [phone] [facility]

## 2021-07-02 RX ORDER — BUTALBITAL, ACETAMINOPHEN, CAFFEINE AND CODEINE PHOSPHATE 50; 325; 40; 30 MG/1; MG/1; MG/1; MG/1
1 CAPSULE ORAL EVERY 6 HOURS PRN
Qty: 14 CAPSULE | Refills: 0 | Status: SHIPPED | OUTPATIENT
Start: 2021-07-02 | End: 2021-10-19 | Stop reason: SDUPTHER

## 2021-07-02 RX ORDER — RIZATRIPTAN BENZOATE 10 MG/1
10 TABLET, ORALLY DISINTEGRATING ORAL ONCE AS NEEDED
Qty: 14 TABLET | Refills: 0 | Status: SHIPPED | OUTPATIENT
Start: 2021-07-02 | End: 2021-10-19 | Stop reason: SDUPTHER

## 2021-09-30 DIAGNOSIS — R51.9 HEADACHE, UNSPECIFIED HEADACHE TYPE: ICD-10-CM

## 2021-10-01 RX ORDER — BUTALBITAL, ACETAMINOPHEN, CAFFEINE AND CODEINE PHOSPHATE 50; 325; 40; 30 MG/1; MG/1; MG/1; MG/1
CAPSULE ORAL
Qty: 14 CAPSULE | Refills: 0 | OUTPATIENT
Start: 2021-10-01

## 2021-10-19 ENCOUNTER — OFFICE VISIT (OUTPATIENT)
Dept: FAMILY MEDICINE CLINIC | Facility: CLINIC | Age: 71
End: 2021-10-19

## 2021-10-19 VITALS — RESPIRATION RATE: 16 BRPM | WEIGHT: 100 LBS | BODY MASS INDEX: 18.4 KG/M2 | TEMPERATURE: 96.9 F | HEIGHT: 62 IN

## 2021-10-19 DIAGNOSIS — M81.0 OSTEOPOROSIS, POST-MENOPAUSAL: Primary | ICD-10-CM

## 2021-10-19 DIAGNOSIS — R51.9 HEADACHE, UNSPECIFIED HEADACHE TYPE: ICD-10-CM

## 2021-10-19 DIAGNOSIS — Z00.00 ANNUAL PHYSICAL EXAM: ICD-10-CM

## 2021-10-19 DIAGNOSIS — E46 PROTEIN-CALORIE MALNUTRITION, UNSPECIFIED SEVERITY (HCC): ICD-10-CM

## 2021-10-19 PROCEDURE — 99213 OFFICE O/P EST LOW 20 MIN: CPT | Performed by: NURSE PRACTITIONER

## 2021-10-19 RX ORDER — RIZATRIPTAN BENZOATE 10 MG/1
10 TABLET, ORALLY DISINTEGRATING ORAL ONCE AS NEEDED
Qty: 14 TABLET | Refills: 1 | Status: SHIPPED | OUTPATIENT
Start: 2021-10-19 | End: 2022-02-07 | Stop reason: SDUPTHER

## 2021-10-19 RX ORDER — BUTALBITAL, ACETAMINOPHEN, CAFFEINE AND CODEINE PHOSPHATE 50; 325; 40; 30 MG/1; MG/1; MG/1; MG/1
1 CAPSULE ORAL EVERY 6 HOURS PRN
Qty: 14 CAPSULE | Refills: 0 | Status: SHIPPED | OUTPATIENT
Start: 2021-10-19 | End: 2021-11-11 | Stop reason: SDUPTHER

## 2021-10-19 RX ORDER — BUTALBITAL, ACETAMINOPHEN, CAFFEINE AND CODEINE PHOSPHATE 50; 325; 40; 30 MG/1; MG/1; MG/1; MG/1
1 CAPSULE ORAL EVERY 6 HOURS PRN
Qty: 50 CAPSULE | Refills: 1 | Status: CANCELLED | OUTPATIENT
Start: 2021-10-19

## 2021-10-19 NOTE — TELEPHONE ENCOUNTER
Refill  NEVIN query complete. Treatment plan to include limited course of prescribed  controlled substance. Risks including addiction, benefits, and alternatives presented to patient.

## 2021-10-19 NOTE — PROGRESS NOTES
Chief Complaint  Migraines    Subjective              Orly presents to [facility]  70-year-old female presents to the office for follow-up with headaches and osteoporosis  Review of Systems   Constitutional: Negative.    HENT: Negative.    Eyes: Negative.    Respiratory: Negative.    Cardiovascular: Negative.    Gastrointestinal: Negative.    Endocrine: Negative.    Genitourinary: Negative.    Musculoskeletal: Negative.    Skin: Negative.    Allergic/Immunologic: Negative.    Neurological: Negative.    Hematological: Negative.    Psychiatric/Behavioral: Negative.    She has atypical migraine headaches that she has been taking Fioricet for. She has decreased the dose within the last few years.  States she only has to take it approximately 1 time per week.  There has been no change in frequency or intensity.  She does supplement with Maxalt as needed.  Last refill was 7/20/2020    She has been on Prolia in the past and will be calling to set up with the infusion center.  She has not taken this injection since Covid.  She has not had any recent lab work    She will return when fasting and would like to coordinate with any lab work the infusion center requests for her Prolia injections restarted    Orly Rivas 70 y.o. female who presents for an Annual Wellness Visit.  she has a history of   Patient Active Problem List   Diagnosis   • Headache   • Osteoporosis, post-menopausal   .  she has been feeling well.  Labs results discussed in detail with the patient.  Plan to update vaccines if needed today.  I  reviewed health maintenance with her as part of my preventative care plan.    Health Habits:  Dental Exam. not up to date - will schedule  Eye Exam. not up to date - will schedule  Exercise: 1 times/week.  Current exercise activities include: walking        She has an active problem list of the following  Patient Active Problem List   Diagnosis   • Headache   • Osteoporosis,  "post-menopausal       He is compliant in the following medications  Current Outpatient Medications on File Prior to Visit   Medication Sig Dispense Refill   • aspirin 81 MG tablet Take 81 mg by mouth daily.     • butalbital-acetaminophen-caffeine-codeine (FIORICET WITH CODEINE) -49-30 MG per capsule Take 1 capsule by mouth Every 6 (Six) Hours As Needed for Headache. 14 capsule 0   • Calcium Carbonate (CALTRATE 600 PO) Take  by mouth 2 (Two) Times a Day.     • calcium citrate-vitamin d (CITRACAL) 200-250 MG-UNIT tablet tablet Take 1 tablet by mouth 2 (Two) Times a Day.     • Loratadine (ALLERGY RELIEF CHILD PO) Take  by mouth.     • naproxen (NAPROSYN) 375 MG tablet Take 1 tablet by mouth 2 (Two) Times a Day As Needed for Mild Pain . 90 tablet 1   • rizatriptan MLT (Maxalt-MLT) 10 MG disintegrating tablet Place 1 tablet on the tongue 1 (One) Time As Needed for Migraine for up to 1 dose. May repeat in 2 hours if needed 14 tablet 0     No current facility-administered medications on file prior to visit.       She denies any medication side effects    All chronic conditions listed above are stable without issues    Since last visit she is overall felt well    The following portions of the patient's history were reviewed and updated as appropriate: allergies, current medications, past family history, past medical history, past social history, past surgical history, and problem list       Objective   Vital Signs:   Vitals:    10/19/21 1119   Resp: 16   Temp: 96.9 °F (36.1 °C)   Weight: 45.4 kg (100 lb)   Height: 157.5 cm (62\")        Physical Exam  Constitutional:       General: She is not in acute distress.     Appearance: Normal appearance. She is normal weight. She is not ill-appearing.   HENT:      Head: Normocephalic.      Nose: Nose normal. No congestion.   Eyes:      Extraocular Movements: Extraocular movements intact.      Conjunctiva/sclera: Conjunctivae normal.   Cardiovascular:      Rate and Rhythm: " Normal rate and regular rhythm.      Pulses: Normal pulses.      Heart sounds: Normal heart sounds. No murmur heard.      Pulmonary:      Effort: Pulmonary effort is normal.      Breath sounds: Normal breath sounds.   Abdominal:      General: Abdomen is flat.      Palpations: Abdomen is soft.   Musculoskeletal:      Cervical back: Normal range of motion and neck supple.   Skin:     General: Skin is warm and dry.      Capillary Refill: Capillary refill takes less than 2 seconds.      Findings: No rash.   Neurological:      Mental Status: She is alert and oriented to person, place, and time.   Psychiatric:         Mood and Affect: Mood normal.         Behavior: Behavior normal.         Thought Content: Thought content normal.         Judgment: Judgment normal.          Result Review :     No recent lab work  Ordered today will return fasting           Assessment and Plan    Diagnoses and all orders for this visit:    1. Osteoporosis, post-menopausal (Primary)  Comments:  Would like to restart Prolia injections  We will call infusion center and send any required paperwork    2. Headache, unspecified headache type  Comments:  Request refill on Fioricet and Maxalt  Denies medication side effects  Migraines approximately 1 time per week  Stable      Plan  Routine lab work ordered today will return fasting  Will call infusion center to set up Prolia injections will send paperwork  Refill medications monitor side effects  Bring headache log to next visit  Healthy diet and exercise at least 3 times per week  Sleep goal 6 to 8 hours          Follow Up   Return in about 3 months (around 1/19/2022).  Patient was given instructions and counseling regarding her condition or for health maintenance advice. Please see specific information pulled into the AVS if appropriate.

## 2021-10-19 NOTE — PATIENT INSTRUCTIONS
Preventive Care 65 Years and Older, Female  Preventive care refers to lifestyle choices and visits with your health care provider that can promote health and wellness. This includes:  · A yearly physical exam. This is also called an annual well check.  · Regular dental and eye exams.  · Immunizations.  · Screening for certain conditions.  · Healthy lifestyle choices, such as diet and exercise.  What can I expect for my preventive care visit?  Physical exam  Your health care provider will check:  · Height and weight. These may be used to calculate body mass index (BMI), which is a measurement that tells if you are at a healthy weight.  · Heart rate and blood pressure.  · Your skin for abnormal spots.  Counseling  Your health care provider may ask you questions about:  · Alcohol, tobacco, and drug use.  · Emotional well-being.  · Home and relationship well-being.  · Sexual activity.  · Eating habits.  · History of falls.  · Memory and ability to understand (cognition).  · Work and work environment.  · Pregnancy and menstrual history.  What immunizations do I need?    Influenza (flu) vaccine  · This is recommended every year.  Tetanus, diphtheria, and pertussis (Tdap) vaccine  · You may need a Td booster every 10 years.  Varicella (chickenpox) vaccine  · You may need this vaccine if you have not already been vaccinated.  Zoster (shingles) vaccine  · You may need this after age 60.  Pneumococcal conjugate (PCV13) vaccine  · One dose is recommended after age 65.  Pneumococcal polysaccharide (PPSV23) vaccine  · One dose is recommended after age 65.  Measles, mumps, and rubella (MMR) vaccine  · You may need at least one dose of MMR if you were born in 1957 or later. You may also need a second dose.  Meningococcal conjugate (MenACWY) vaccine  · You may need this if you have certain conditions.  Hepatitis A vaccine  · You may need this if you have certain conditions or if you travel or work in places where you may be exposed  to hepatitis A.  Hepatitis B vaccine  · You may need this if you have certain conditions or if you travel or work in places where you may be exposed to hepatitis B.  Haemophilus influenzae type b (Hib) vaccine  · You may need this if you have certain conditions.  You may receive vaccines as individual doses or as more than one vaccine together in one shot (combination vaccines). Talk with your health care provider about the risks and benefits of combination vaccines.  What tests do I need?  Blood tests  · Lipid and cholesterol levels. These may be checked every 5 years, or more frequently depending on your overall health.  · Hepatitis C test.  · Hepatitis B test.  Screening  · Lung cancer screening. You may have this screening every year starting at age 55 if you have a 30-pack-year history of smoking and currently smoke or have quit within the past 15 years.  · Colorectal cancer screening. All adults should have this screening starting at age 50 and continuing until age 75. Your health care provider may recommend screening at age 45 if you are at increased risk. You will have tests every 1-10 years, depending on your results and the type of screening test.  · Diabetes screening. This is done by checking your blood sugar (glucose) after you have not eaten for a while (fasting). You may have this done every 1-3 years.  · Mammogram. This may be done every 1-2 years. Talk with your health care provider about how often you should have regular mammograms.  · BRCA-related cancer screening. This may be done if you have a family history of breast, ovarian, tubal, or peritoneal cancers.  Other tests  · Sexually transmitted disease (STD) testing.  · Bone density scan. This is done to screen for osteoporosis. You may have this done starting at age 65.  Follow these instructions at home:  Eating and drinking  · Eat a diet that includes fresh fruits and vegetables, whole grains, lean protein, and low-fat dairy products. Limit  your intake of foods with high amounts of sugar, saturated fats, and salt.  · Take vitamin and mineral supplements as recommended by your health care provider.  · Do not drink alcohol if your health care provider tells you not to drink.  · If you drink alcohol:  ? Limit how much you have to 0-1 drink a day.  ? Be aware of how much alcohol is in your drink. In the [place] one drink equals one 12 oz bottle of beer (355 mL), one 5 oz glass of wine (148 mL), or one 1½ oz glass of hard liquor (44 mL).  Lifestyle  · Take daily care of your teeth and gums.  · Stay active. Exercise for at least 30 minutes on 5 or more days each week.  · Do not use any products that contain nicotine or tobacco, such as cigarettes, e-cigarettes, and chewing tobacco. If you need help quitting, ask your health care provider.  · If you are sexually active, practice safe sex. Use a condom or other form of protection in order to prevent STIs (sexually transmitted infections).  · Talk with your health care provider about taking a low-dose aspirin or statin.  What's next?  · Go to your health care provider once a year for a well check visit.  · Ask your health care provider how often you should have your eyes and teeth checked.  · Stay up to date on all vaccines.  This information is not intended to replace advice given to you by your health care provider. Make sure you discuss any questions you have with your health care provider.  Document Revised: 12/12/2019 Document Reviewed: 12/12/2019  Elsevier Patient Education © 2020 [employer]  Osteoporosis    Osteoporosis happens when the bones become thin and less dense than normal. Osteoporosis makes bones more brittle and fragile and more likely to break (fracture).  Over time, osteoporosis can cause your bones to become so weak that they fracture after a minor fall. Bones in the hip, wrist, and spine are most likely to fracture due to osteoporosis.  What are the causes?  The exact cause of this  condition is not known.  What increases the risk?  You are more likely to develop this condition if you:  · Have family members with this condition.  · Have poor nutrition.  · Use the following:  ? Steroid medicines, such as prednisone.  ? Anti-seizure medicines.  ? Nicotine or tobacco, such as cigarettes, e-cigarettes, and chewing tobacco.  · Are female.  · Are age 50 or older.  · Are not physically active (are sedentary).  · Are of  or  descent.  · Have a small body frame.  What are the signs or symptoms?  A fracture might be the first sign of osteoporosis, especially if the fracture results from a fall or injury that usually would not cause a bone to break. Other signs and symptoms include:  · Pain in the neck or low back.  · Stooped posture.  · Loss of height.  How is this diagnosed?  This condition may be diagnosed based on:  · Your medical history.  · A physical exam.  · A bone mineral density test, also called a DXA or DEXA test (dual-energy X-ray absorptiometry test). This test uses X-rays to measure the amount of minerals in your bones.  How is this treated?  This condition may be treated by:  · Making lifestyle changes, such as:  ? Including foods with more calcium and vitamin D in your diet.  ? Doing weight-bearing and muscle-strengthening exercises.  ? Stopping tobacco use.  ? Limiting alcohol intake.  · Taking medicine to slow the process of bone loss or to increase bone density.  · Taking daily supplements of calcium and vitamin D.  · Taking hormone replacement medicines, such as estrogen for women and testosterone for men.  · Monitoring your levels of calcium and vitamin D.  The goal of treatment is to strengthen your bones and lower your risk for a fracture.  Follow these instructions at home:  Eating and drinking  Include calcium and vitamin D in your diet. Calcium is important for bone health, and vitamin D helps your body absorb calcium. Good sources of calcium and vitamin D  include:  · Certain fatty fish, such as salmon and tuna.  · Products that have calcium and vitamin D added to them (are fortified), such as fortified cereals.  · Egg yolks.  · Cheese.  · Liver.    Activity  Do exercises as told by your health care provider. Ask your health care provider what exercises and activities are safe for you. You should do:  · Exercises that make you work against gravity (weight-bearing exercises), such as tnoja chi, yoga, or walking.  · Exercises to strengthen muscles, such as lifting weights.  Lifestyle  · Do not drink alcohol if:  ? Your health care provider tells you not to drink.  ? You are pregnant, may be pregnant, or are planning to become pregnant.  · If you drink alcohol:  ? Limit how much you use to:  § 0-1 drink a day for women.  § 0-2 drinks a day for men.  · Know how much alcohol is in your drink. In the [place] one drink equals one 12 oz bottle of beer (355 mL), one 5 oz glass of wine (148 mL), or one 1½ oz glass of hard liquor (44 mL).  · Do not use any products that contain nicotine or tobacco, such as cigarettes, e-cigarettes, and chewing tobacco. If you need help quitting, ask your health care provider.  Preventing falls  · Use devices to help you move around (mobility aids) as needed, such as canes, walkers, scooters, or crutches.  · Keep rooms well-lit and clutter-free.  · Remove tripping hazards from walkways, including cords and throw rugs.  · Install grab bars in bathrooms and safety rails on stairs.  · Use rubber mats in the bathroom and other areas that are often wet or slippery.  · Wear closed-toe shoes that fit well and support your feet. Wear shoes that have rubber soles or low heels.  · Review your medicines with your health care provider. Some medicines can cause dizziness or changes in blood pressure, which can increase your risk of falling.  General instructions  · Take over-the-counter and prescription medicines only as told by your health care provider.  · Keep  all follow-up visits. This is important.  Contact a health care provider if:  · You have never been screened for osteoporosis and you are:  ? A woman who is age 65 or older.  ? A man who is age 70 or older.  Get help right away if:  · You fall or injure yourself.  Summary  · Osteoporosis is thinning and loss of density in your bones. This makes bones more brittle and fragile and more likely to break (fracture),even with minor falls.  · The goal of treatment is to strengthen your bones and lower your risk for a fracture.  · Include calcium and vitamin D in your diet. Calcium is important for bone health, and vitamin D helps your body absorb calcium.  · Talk with your health care provider about screening for osteoporosis if you are a woman who is age 65 or older, or a man who is age 70 or older.  This information is not intended to replace advice given to you by your health care provider. Make sure you discuss any questions you have with your health care provider.  Document Revised: 06/03/2021 Document Reviewed: 06/03/2021  [employer] Patient Education © 2021 [employer]

## 2021-11-08 DIAGNOSIS — R51.9 HEADACHE, UNSPECIFIED HEADACHE TYPE: ICD-10-CM

## 2021-11-08 RX ORDER — BUTALBITAL, ACETAMINOPHEN, CAFFEINE AND CODEINE PHOSPHATE 50; 325; 40; 30 MG/1; MG/1; MG/1; MG/1
1 CAPSULE ORAL EVERY 6 HOURS PRN
Qty: 14 CAPSULE | Refills: 0 | Status: CANCELLED | OUTPATIENT
Start: 2021-11-08

## 2021-11-08 NOTE — TELEPHONE ENCOUNTER
Caller: Orly Rivas    Relationship: Self    Requested Prescriptions:   Requested Prescriptions     Pending Prescriptions Disp Refills   • butalbital-acetaminophen-caffeine-codeine (FIORICET WITH CODEINE) -23-30 MG per capsule 14 capsule 0     Sig: Take 1 capsule by mouth Every 6 (Six) Hours As Needed for Headache.        Pharmacy where request should be sent: [facility] - [address] - [phone] [facility] [phone]   [phone]    Additional details provided by patient: PATIENT STATES SHE ALWAYS GOT 50 PREVIOUSLY BUT HERE RECENTLY SHE'S ONLY BEEN GETTING 14 AND THAT IS COSTING HER MORE.     Best call back number: [phone]    Does the patient have less than a 3 day supply:  [x] Yes  [] No    Evette Bill Rep   11/08/21 13:47 EST

## 2021-11-10 ENCOUNTER — TELEPHONE (OUTPATIENT)
Dept: FAMILY MEDICINE CLINIC | Facility: CLINIC | Age: 71
End: 2021-11-10

## 2021-11-10 NOTE — TELEPHONE ENCOUNTER
Caller: MEEK    Relationship: Other    Best call back number: [phone]    What specialty or service is being requested: AMBULATORY REFERRAL FOR PROLIA INJECTIONS    Any additional details: MEEK FROM [facility] REQUESTING THIS, LAST REFERRAL IS FROM DR. RICCI AND PATIENT HAS ESTABLISHED WITH YOLY MCCORMICK

## 2021-11-11 DIAGNOSIS — M81.0 OSTEOPOROSIS, POST-MENOPAUSAL: Primary | ICD-10-CM

## 2021-11-11 RX ORDER — BUTALBITAL, ACETAMINOPHEN, CAFFEINE AND CODEINE PHOSPHATE 50; 325; 40; 30 MG/1; MG/1; MG/1; MG/1
1 CAPSULE ORAL EVERY 6 HOURS PRN
Qty: 50 CAPSULE | Refills: 0 | Status: SHIPPED | OUTPATIENT
Start: 2021-11-11 | End: 2022-02-07 | Stop reason: SDUPTHER

## 2021-12-28 ENCOUNTER — APPOINTMENT (OUTPATIENT)
Dept: WOMENS IMAGING | Facility: HOSPITAL | Age: 71
End: 2021-12-28

## 2021-12-28 PROCEDURE — 77067 SCR MAMMO BI INCL CAD: CPT | Performed by: RADIOLOGY

## 2021-12-28 PROCEDURE — 77063 BREAST TOMOSYNTHESIS BI: CPT | Performed by: RADIOLOGY

## 2022-01-31 DIAGNOSIS — R51.9 HEADACHE, UNSPECIFIED HEADACHE TYPE: ICD-10-CM

## 2022-02-01 RX ORDER — BUTALBITAL, ACETAMINOPHEN, CAFFEINE AND CODEINE PHOSPHATE 50; 325; 40; 30 MG/1; MG/1; MG/1; MG/1
CAPSULE ORAL
Qty: 50 CAPSULE | Refills: 0 | OUTPATIENT
Start: 2022-02-01

## 2022-02-07 ENCOUNTER — OFFICE VISIT (OUTPATIENT)
Dept: FAMILY MEDICINE CLINIC | Facility: CLINIC | Age: 72
End: 2022-02-07

## 2022-02-07 VITALS
OXYGEN SATURATION: 96 % | SYSTOLIC BLOOD PRESSURE: 133 MMHG | RESPIRATION RATE: 18 BRPM | DIASTOLIC BLOOD PRESSURE: 79 MMHG | HEIGHT: 62 IN | BODY MASS INDEX: 18.58 KG/M2 | TEMPERATURE: 97.3 F | WEIGHT: 101 LBS | HEART RATE: 86 BPM

## 2022-02-07 DIAGNOSIS — E78.2 MIXED HYPERLIPIDEMIA: ICD-10-CM

## 2022-02-07 DIAGNOSIS — G43.009 ATYPICAL MIGRAINE: Primary | ICD-10-CM

## 2022-02-07 DIAGNOSIS — R51.9 HEADACHE, UNSPECIFIED HEADACHE TYPE: ICD-10-CM

## 2022-02-07 DIAGNOSIS — M81.0 OSTEOPOROSIS, POST-MENOPAUSAL: ICD-10-CM

## 2022-02-07 PROCEDURE — 99214 OFFICE O/P EST MOD 30 MIN: CPT | Performed by: NURSE PRACTITIONER

## 2022-02-07 RX ORDER — NAPROXEN 375 MG/1
375 TABLET ORAL 2 TIMES DAILY PRN
Qty: 90 TABLET | Refills: 1 | Status: SHIPPED | OUTPATIENT
Start: 2022-02-07

## 2022-02-07 RX ORDER — RIZATRIPTAN BENZOATE 10 MG/1
10 TABLET, ORALLY DISINTEGRATING ORAL ONCE AS NEEDED
Qty: 14 TABLET | Refills: 1 | Status: SHIPPED | OUTPATIENT
Start: 2022-02-07 | End: 2022-07-21 | Stop reason: SDUPTHER

## 2022-02-07 RX ORDER — BUTALBITAL, ACETAMINOPHEN, CAFFEINE AND CODEINE PHOSPHATE 50; 325; 40; 30 MG/1; MG/1; MG/1; MG/1
1 CAPSULE ORAL EVERY 6 HOURS PRN
Qty: 50 CAPSULE | Refills: 0 | Status: SHIPPED | OUTPATIENT
Start: 2022-02-07 | End: 2022-02-10 | Stop reason: SDUPTHER

## 2022-02-07 NOTE — TELEPHONE ENCOUNTER
NEVIN query complete. Treatment plan to include limited course of prescribed  controlled substance. Risks including addiction, benefits, and alternatives presented to patient.

## 2022-02-07 NOTE — PROGRESS NOTES
Chief Complaint  Headache (med refills)    Subjective          Orly presents to [facility]    71-year-old female presents to the office today for follow-up on migraines and medication refill.    She has atypical migraine headaches that she has been taking Fioricet for. She has decreased the dose within the last few years.  States she only has to take it approximately 1-2 time per week.  There has been no change in frequency or intensity.  She does supplement with Maxalt/naproxen as needed .  Last refill was 11/14/21    She has been on Prolia in the past and will be calling to set up with the infusion center.  She has not taken this injection since Covid.  She has not had any recent lab work    He only other C/o is continued arthritis pain in her hands.  Takes Naproxen rarely no other medications      She has an active problem list of the following  Patient Active Problem List   Diagnosis   • Headache   • Osteoporosis, post-menopausal       She has been compliant with the following medications  Current Outpatient Medications on File Prior to Visit   Medication Sig Dispense Refill   • aspirin 81 MG tablet Take 81 mg by mouth daily.     • butalbital-acetaminophen-caffeine-codeine (FIORICET WITH CODEINE) -21-30 MG per capsule Take 1 capsule by mouth Every 6 (Six) Hours As Needed for Headache. 50 capsule 0   • calcium citrate-vitamin d (CITRACAL) 200-250 MG-UNIT tablet tablet Take 1 tablet by mouth 2 (Two) Times a Day.     • Loratadine (ALLERGY RELIEF CHILD PO) Take  by mouth.     • [DISCONTINUED] naproxen (NAPROSYN) 375 MG tablet Take 1 tablet by mouth 2 (Two) Times a Day As Needed for Mild Pain . 90 tablet 1   • [DISCONTINUED] rizatriptan MLT (Maxalt-MLT) 10 MG disintegrating tablet Place 1 tablet on the tongue 1 (One) Time As Needed for Migraine for up to 1 dose. May repeat in 2 hours if needed 14 tablet 1   • [DISCONTINUED] Calcium Carbonate (CALTRATE 600 PO) Take  by mouth 2  "(Two) Times a Day.       No current facility-administered medications on file prior to visit.       She denies any medication side effects    The following portions of the patient's history were reviewed and updated as appropriate: allergies, current medications, past family history, past medical history, past social history, past surgical history, and problem list        Review of Systems   Constitutional: Negative for chills, fatigue and fever.   Eyes: Negative for visual disturbance.   Respiratory: Negative for cough and shortness of breath.    Cardiovascular: Negative for chest pain, palpitations and leg swelling.   Gastrointestinal: Negative for abdominal pain, diarrhea, nausea and vomiting.   Musculoskeletal: Positive for arthralgias.   Neurological: Negative for dizziness and light-headedness.        Objective   Vital Signs:   Vitals:    02/07/22 1023   BP: 133/79   Pulse: 86   Resp: 18   Temp: 97.3 °F (36.3 °C)   SpO2: 96%   Weight: 45.8 kg (101 lb)   Height: 157 cm (61.81\")        Physical Exam  Constitutional:       General: She is not in acute distress.     Appearance: Normal appearance. She is normal weight. She is not ill-appearing.   HENT:      Head: Normocephalic.   Eyes:      Extraocular Movements: Extraocular movements intact.      Conjunctiva/sclera: Conjunctivae normal.      Pupils: Pupils are equal, round, and reactive to light.   Cardiovascular:      Rate and Rhythm: Normal rate and regular rhythm.      Pulses: Normal pulses.      Heart sounds: Normal heart sounds. No murmur heard.      Pulmonary:      Effort: Pulmonary effort is normal. No respiratory distress.      Breath sounds: Normal breath sounds. No stridor. No wheezing, rhonchi or rales.   Chest:      Chest wall: No tenderness.   Skin:     General: Skin is warm and dry.      Findings: No rash.   Neurological:      Mental Status: She is alert and oriented to person, place, and time.   Psychiatric:         Mood and Affect: Mood normal.    "      Behavior: Behavior normal.         Thought Content: Thought content normal.         Judgment: Judgment normal.          Result Review :     The following data was reviewed by: CHANTEL Peñaloza on 02/07/2022:      Due for fasting labs     Assessment and Plan    Diagnoses and all orders for this visit:    1. Headache, unspecified headache type  Comments:  Request refill on Fioricet and Maxalt  Denies medication side effects  Migraines approximately 1 time per week  Stable  Orders:  -     rizatriptan MLT (Maxalt-MLT) 10 MG disintegrating tablet; Place 1 tablet on the tongue 1 (One) Time As Needed for Migraine for up to 1 dose. May repeat in 2 hours if needed  Dispense: 14 tablet; Refill: 1  -     naproxen (Naprosyn) 375 MG tablet; Take 1 tablet by mouth 2 (Two) Times a Day As Needed for Mild Pain .  Dispense: 90 tablet; Refill: 1  -     TSH  -     T4, Free  -     Vitamin D 25 Hydroxy  -     Lipid Panel  -     CBC & Differential    2. Osteoporosis, post-menopausal  -     TSH  -     T4, Free  -     Vitamin D 25 Hydroxy  -     Lipid Panel  -     CBC & Differential    3. Mixed hyperlipidemia  -     TSH  -     T4, Free  -     Vitamin D 25 Hydroxy  -     Lipid Panel  -     CBC & Differential    Plan  Routine lab work ordered today will return fasting  Will call infusion center to set up Prolia injections will send paperwork  Refill medications monitor side effects  Bring headache log to next visit  Healthy diet and exercise at least 3 times per week  Sleep goal 6 to 8 hours  Discussed Voltaren gel for hands instead of naproxen  Due for pap- will schedule  Due for Dexa-  Will schedule  Due for colonoscopy-  Declined   Healthy well blanced  Exercise 30 minutes most days of the week  Make sure you get results on any labs or tests we ordered today  We discussed medications and how to take them as prescribed  Sleep 6-8 hours each night if possible  If you have not signed up for [employer], please activate your code ASAP  from your After Visit Summary today     LDL goal <100  LDL goal if heart disease <70  HDL goal >60  Triglyceride goal <150  BP goal =<130/80  Fasting glucose <100         Follow Up   Return in about 3 months (around 5/7/2022) for Next scheduled follow up.  Patient was given instructions and counseling regarding her condition or for health maintenance advice. Please see specific information pulled into the AVS if appropriate.

## 2022-02-07 NOTE — PATIENT INSTRUCTIONS
Preventive Care 65 Years and Older, Female  Preventive care refers to lifestyle choices and visits with your health care provider that can promote health and wellness. This includes:  · A yearly physical exam. This is also called an annual well check.  · Regular dental and eye exams.  · Immunizations.  · Screening for certain conditions.  · Healthy lifestyle choices, such as diet and exercise.  What can I expect for my preventive care visit?  Physical exam  Your health care provider will check:  · Height and weight. These may be used to calculate body mass index (BMI), which is a measurement that tells if you are at a healthy weight.  · Heart rate and blood pressure.  · Your skin for abnormal spots.  Counseling  Your health care provider may ask you questions about:  · Alcohol, tobacco, and drug use.  · Emotional well-being.  · Home and relationship well-being.  · Sexual activity.  · Eating habits.  · History of falls.  · Memory and ability to understand (cognition).  · Work and work environment.  · Pregnancy and menstrual history.  What immunizations do I need?    Influenza (flu) vaccine  · This is recommended every year.  Tetanus, diphtheria, and pertussis (Tdap) vaccine  · You may need a Td booster every 10 years.  Varicella (chickenpox) vaccine  · You may need this vaccine if you have not already been vaccinated.  Zoster (shingles) vaccine  · You may need this after age 60.  Pneumococcal conjugate (PCV13) vaccine  · One dose is recommended after age 65.  Pneumococcal polysaccharide (PPSV23) vaccine  · One dose is recommended after age 65.  Measles, mumps, and rubella (MMR) vaccine  · You may need at least one dose of MMR if you were born in 1957 or later. You may also need a second dose.  Meningococcal conjugate (MenACWY) vaccine  · You may need this if you have certain conditions.  Hepatitis A vaccine  · You may need this if you have certain conditions or if you travel or work in places where you may be exposed  to hepatitis A.  Hepatitis B vaccine  · You may need this if you have certain conditions or if you travel or work in places where you may be exposed to hepatitis B.  Haemophilus influenzae type b (Hib) vaccine  · You may need this if you have certain conditions.  You may receive vaccines as individual doses or as more than one vaccine together in one shot (combination vaccines). Talk with your health care provider about the risks and benefits of combination vaccines.  What tests do I need?  Blood tests  · Lipid and cholesterol levels. These may be checked every 5 years, or more frequently depending on your overall health.  · Hepatitis C test.  · Hepatitis B test.  Screening  · Lung cancer screening. You may have this screening every year starting at age 55 if you have a 30-pack-year history of smoking and currently smoke or have quit within the past 15 years.  · Colorectal cancer screening. All adults should have this screening starting at age 50 and continuing until age 75. Your health care provider may recommend screening at age 45 if you are at increased risk. You will have tests every 1-10 years, depending on your results and the type of screening test.  · Diabetes screening. This is done by checking your blood sugar (glucose) after you have not eaten for a while (fasting). You may have this done every 1-3 years.  · Mammogram. This may be done every 1-2 years. Talk with your health care provider about how often you should have regular mammograms.  · BRCA-related cancer screening. This may be done if you have a family history of breast, ovarian, tubal, or peritoneal cancers.  Other tests  · Sexually transmitted disease (STD) testing.  · Bone density scan. This is done to screen for osteoporosis. You may have this done starting at age 65.  Follow these instructions at home:  Eating and drinking  · Eat a diet that includes fresh fruits and vegetables, whole grains, lean protein, and low-fat dairy products. Limit  your intake of foods with high amounts of sugar, saturated fats, and salt.  · Take vitamin and mineral supplements as recommended by your health care provider.  · Do not drink alcohol if your health care provider tells you not to drink.  · If you drink alcohol:  ? Limit how much you have to 0-1 drink a day.  ? Be aware of how much alcohol is in your drink. In the [place] one drink equals one 12 oz bottle of beer (355 mL), one 5 oz glass of wine (148 mL), or one 1½ oz glass of hard liquor (44 mL).  Lifestyle  · Take daily care of your teeth and gums.  · Stay active. Exercise for at least 30 minutes on 5 or more days each week.  · Do not use any products that contain nicotine or tobacco, such as cigarettes, e-cigarettes, and chewing tobacco. If you need help quitting, ask your health care provider.  · If you are sexually active, practice safe sex. Use a condom or other form of protection in order to prevent STIs (sexually transmitted infections).  · Talk with your health care provider about taking a low-dose aspirin or statin.  What's next?  · Go to your health care provider once a year for a well check visit.  · Ask your health care provider how often you should have your eyes and teeth checked.  · Stay up to date on all vaccines.  This information is not intended to replace advice given to you by your health care provider. Make sure you discuss any questions you have with your health care provider.  Document Revised: 12/12/2019 Document Reviewed: 12/12/2019  [employer] Patient Education © 2020 [employer]  Migraine Headache  A migraine headache is a very strong throbbing pain on one side or both sides of your head. This type of headache can also cause other symptoms. It can last from 4 hours to 3 days. Talk with your doctor about what things may bring on (trigger) this condition.  What are the causes?  The exact cause of this condition is not known. This condition may be triggered or caused by:  · Drinking  alcohol.  · Smoking.  · Taking medicines, such as:  ? Medicine used to treat chest pain (nitroglycerin).  ? Birth control pills.  ? Estrogen.  ? Some blood pressure medicines.  · Eating or drinking certain products.  · Doing physical activity.  Other things that may trigger a migraine headache include:  · Having a menstrual period.  · Pregnancy.  · Hunger.  · Stress.  · Not getting enough sleep or getting too much sleep.  · Weather changes.  · Tiredness (fatigue).  What increases the risk?  · Being 25-55 years old.  · Being female.  · Having a family history of migraine headaches.  · Being .  · Having depression or anxiety.  · Being very overweight.  What are the signs or symptoms?  · A throbbing pain. This pain may:  ? Happen in any area of the head, such as on one side or both sides.  ? Make it hard to do daily activities.  ? Get worse with physical activity.  ? Get worse around bright lights or loud noises.  · Other symptoms may include:  ? Feeling sick to your stomach (nauseous).  ? Vomiting.  ? Dizziness.  ? Being sensitive to bright lights, loud noises, or smells.  · Before you get a migraine headache, you may get warning signs (an aura). An aura may include:  ? Seeing flashing lights or having blind spots.  ? Seeing bright spots, halos, or zigzag lines.  ? Having tunnel vision or blurred vision.  ? Having numbness or a tingling feeling.  ? Having trouble talking.  ? Having weak muscles.  · Some people have symptoms after a migraine headache (postdromal phase), such as:  ? Tiredness.  ? Trouble thinking (concentrating).  How is this treated?  · Taking medicines that:  ? Relieve pain.  ? Relieve the feeling of being sick to your stomach.  ? Prevent migraine headaches.  · Treatment may also include:  ? Having acupuncture.  ? Avoiding foods that bring on migraine headaches.  ? Learning ways to control your body functions (biofeedback).  ? Therapy to help you know and deal with negative thoughts  (cognitive behavioral therapy).  Follow these instructions at home:  Medicines  · Take over-the-counter and prescription medicines only as told by your doctor.  · Ask your doctor if the medicine prescribed to you:  ? Requires you to avoid driving or using heavy machinery.  ? Can cause trouble pooping (constipation). You may need to take these steps to prevent or treat trouble pooping:  § Drink enough fluid to keep your pee (urine) pale yellow.  § Take over-the-counter or prescription medicines.  § Eat foods that are high in fiber. These include beans, whole grains, and fresh fruits and vegetables.  § Limit foods that are high in fat and sugar. These include fried or sweet foods.  Lifestyle  · Do not drink alcohol.  · Do not use any products that contain nicotine or tobacco, such as cigarettes, e-cigarettes, and chewing tobacco. If you need help quitting, ask your doctor.  · Get at least 8 hours of sleep every night.  · Limit and deal with stress.  General instructions         · Keep a journal to find out what may bring on your migraine headaches. For example, write down:  ? What you eat and drink.  ? How much sleep you get.  ? Any change in what you eat or drink.  ? Any change in your medicines.  · If you have a migraine headache:  ? Avoid things that make your symptoms worse, such as bright lights.  ? It may help to lie down in a dark, quiet room.  ? Do not drive or use heavy machinery.  ? Ask your doctor what activities are safe for you.  · Keep all follow-up visits as told by your doctor. This is important.  Contact a doctor if:  · You get a migraine headache that is different or worse than others you have had.  · You have more than 15 headache days in one month.  Get help right away if:  · Your migraine headache gets very bad.  · Your migraine headache lasts longer than 72 hours.  · You have a fever.  · You have a stiff neck.  · You have trouble seeing.  · Your muscles feel weak or like you cannot control  them.  · You start to lose your balance a lot.  · You start to have trouble walking.  · You pass out (faint).  · You have a seizure.  Summary  · A migraine headache is a very strong throbbing pain on one side or both sides of your head. These headaches can also cause other symptoms.  · This condition may be treated with medicines and changes to your lifestyle.  · Keep a journal to find out what may bring on your migraine headaches.  · Contact a doctor if you get a migraine headache that is different or worse than others you have had.  · Contact your doctor if you have more than 15 headache days in a month.  This information is not intended to replace advice given to you by your health care provider. Make sure you discuss any questions you have with your health care provider.  Document Revised: 04/10/2020 Document Reviewed: 01/30/2020  [employer] Patient Education © 2021 [employer] Inc.

## 2022-02-08 LAB
25(OH)D3+25(OH)D2 SERPL-MCNC: 75.9 NG/ML (ref 30–100)
BASOPHILS # BLD AUTO: 0.1 X10E3/UL (ref 0–0.2)
BASOPHILS NFR BLD AUTO: 2 %
CHOLEST SERPL-MCNC: 200 MG/DL (ref 100–199)
EOSINOPHIL # BLD AUTO: 0.2 X10E3/UL (ref 0–0.4)
EOSINOPHIL NFR BLD AUTO: 4 %
ERYTHROCYTE [DISTWIDTH] IN BLOOD BY AUTOMATED COUNT: 12.3 % (ref 11.7–15.4)
HCT VFR BLD AUTO: 41.6 % (ref 34–46.6)
HDLC SERPL-MCNC: 73 MG/DL
HGB BLD-MCNC: 13.8 G/DL (ref 11.1–15.9)
IMM GRANULOCYTES # BLD AUTO: 0 X10E3/UL (ref 0–0.1)
IMM GRANULOCYTES NFR BLD AUTO: 0 %
LDLC SERPL CALC-MCNC: 112 MG/DL (ref 0–99)
LYMPHOCYTES # BLD AUTO: 2 X10E3/UL (ref 0.7–3.1)
LYMPHOCYTES NFR BLD AUTO: 39 %
MCH RBC QN AUTO: 30.5 PG (ref 26.6–33)
MCHC RBC AUTO-ENTMCNC: 33.2 G/DL (ref 31.5–35.7)
MCV RBC AUTO: 92 FL (ref 79–97)
MONOCYTES # BLD AUTO: 0.5 X10E3/UL (ref 0.1–0.9)
MONOCYTES NFR BLD AUTO: 10 %
NEUTROPHILS # BLD AUTO: 2.3 X10E3/UL (ref 1.4–7)
NEUTROPHILS NFR BLD AUTO: 45 %
PLATELET # BLD AUTO: 252 X10E3/UL (ref 150–450)
RBC # BLD AUTO: 4.52 X10E6/UL (ref 3.77–5.28)
T4 FREE SERPL-MCNC: 1.18 NG/DL (ref 0.82–1.77)
TRIGL SERPL-MCNC: 86 MG/DL (ref 0–149)
TSH SERPL DL<=0.005 MIU/L-ACNC: 5.32 UIU/ML (ref 0.45–4.5)
VLDLC SERPL CALC-MCNC: 15 MG/DL (ref 5–40)
WBC # BLD AUTO: 5 X10E3/UL (ref 3.4–10.8)

## 2022-02-09 ENCOUNTER — PRIOR AUTHORIZATION (OUTPATIENT)
Dept: FAMILY MEDICINE CLINIC | Facility: CLINIC | Age: 72
End: 2022-02-09

## 2022-02-09 DIAGNOSIS — R51.9 HEADACHE, UNSPECIFIED HEADACHE TYPE: ICD-10-CM

## 2022-02-09 RX ORDER — BUTALBITAL, ACETAMINOPHEN, CAFFEINE AND CODEINE PHOSPHATE 50; 325; 40; 30 MG/1; MG/1; MG/1; MG/1
1 CAPSULE ORAL EVERY 6 HOURS PRN
Qty: 50 CAPSULE | Refills: 0 | Status: CANCELLED | OUTPATIENT
Start: 2022-02-09

## 2022-02-09 NOTE — TELEPHONE ENCOUNTER
I am forwarding this to you. She will need to amend her controlled substance paperwork to change pharmacy and if you feel comfortable with this change, then set up the prescription and I will sign it. Dr. Espinal

## 2022-02-09 NOTE — TELEPHONE ENCOUNTER
Insurance won't pay for Firocet anymore.  I spoke with patient about [facility] and she knows how to use that.      Could you please sent the Firocet to   [facility] [place]?    -Vale

## 2022-02-09 NOTE — TELEPHONE ENCOUNTER
PA started for [facility]  RCVD paper from insurance stating this was not formulary and submit a coverage review.     Vale

## 2022-02-10 DIAGNOSIS — R51.9 HEADACHE, UNSPECIFIED HEADACHE TYPE: ICD-10-CM

## 2022-02-10 RX ORDER — BUTALBITAL, ACETAMINOPHEN, CAFFEINE AND CODEINE PHOSPHATE 50; 325; 40; 30 MG/1; MG/1; MG/1; MG/1
1 CAPSULE ORAL EVERY 6 HOURS PRN
Qty: 50 CAPSULE | Refills: 0 | Status: SHIPPED | OUTPATIENT
Start: 2022-02-10 | End: 2022-05-10 | Stop reason: SDUPTHER

## 2022-02-10 NOTE — TELEPHONE ENCOUNTER
See will use [facility] and will update [facility] as her only pharmacy to refill medication

## 2022-02-16 ENCOUNTER — TELEPHONE (OUTPATIENT)
Dept: FAMILY MEDICINE CLINIC | Facility: CLINIC | Age: 72
End: 2022-02-16

## 2022-02-16 NOTE — TELEPHONE ENCOUNTER
Caller: Orly Rivas    Relationship: Self    Best call back number: [phone]    What is the best time to reach you: ANY    Who are you requesting to speak with (clinical staff, provider,  specific staff member): CLINICAL    Do you know the name of the person who called: SUDHAKAR    What was the call regarding: PATIENT HAS A QUESTION REGARDING butalbital-acetaminophen-caffeine-codeine (FIORICET WITH CODEINE) -27-30 MG per capsule    Do you require a callback: YES

## 2022-02-16 NOTE — TELEPHONE ENCOUNTER
Confirming [facility] pharmacy instead of [facility].     [facility] even though it was denied and she didn't want to mess up any controlled agreement she has with us. I told her that sometimes they will go ahead and send one month until they can either get the PA or change the medication. Since it was denied, she is going to go forward with getting it at [facility].       Vale

## 2022-05-10 ENCOUNTER — OFFICE VISIT (OUTPATIENT)
Dept: FAMILY MEDICINE CLINIC | Facility: CLINIC | Age: 72
End: 2022-05-10

## 2022-05-10 VITALS
TEMPERATURE: 97.5 F | SYSTOLIC BLOOD PRESSURE: 118 MMHG | HEART RATE: 77 BPM | OXYGEN SATURATION: 98 % | BODY MASS INDEX: 19.14 KG/M2 | RESPIRATION RATE: 18 BRPM | DIASTOLIC BLOOD PRESSURE: 70 MMHG | WEIGHT: 104 LBS | HEIGHT: 62 IN

## 2022-05-10 DIAGNOSIS — M81.0 OSTEOPOROSIS, POST-MENOPAUSAL: ICD-10-CM

## 2022-05-10 DIAGNOSIS — M19.041 OSTEOARTHRITIS OF BOTH HANDS, UNSPECIFIED OSTEOARTHRITIS TYPE: ICD-10-CM

## 2022-05-10 DIAGNOSIS — R51.9 HEADACHE, UNSPECIFIED HEADACHE TYPE: ICD-10-CM

## 2022-05-10 DIAGNOSIS — G43.009 ATYPICAL MIGRAINE: Primary | ICD-10-CM

## 2022-05-10 DIAGNOSIS — M19.042 OSTEOARTHRITIS OF BOTH HANDS, UNSPECIFIED OSTEOARTHRITIS TYPE: ICD-10-CM

## 2022-05-10 PROCEDURE — 99213 OFFICE O/P EST LOW 20 MIN: CPT | Performed by: NURSE PRACTITIONER

## 2022-05-10 NOTE — PROGRESS NOTES
Chief Complaint  Headache (MED REFILL)    Subjective          Orly presents to [facility]      71 year old female presented to the clinic today to follow up on her migraine headaches and for Rx refill.  She has a history of atypical migraine HA.  Onset several year ago.  Current Rx is Fioricet.  She takes as needed.  Migraines 1-2 x per week.  No aura. NO N/V  Lasts several hours.  NO change in frequency or intensity since last visit.   She did decrease her dose within the last few years, but has not found another medication that helps with her symptoms.  Denies any medication side effects.  She does supplement with maxalt /naproxen as needed.  Last refill 2/28/2022.    She has been on Prolia in the past and has been trying to set up appointment with the infusion center.  She has not received this medication since Covid 19 pandemic.  Last lab work 2/2022.    She continues to c/o arthritis pain in her hands.  Improved with movement.  Takes naproxen as needed.  Rarely uses.  NO other medication.  She has not tried the Voltaren gel recommended on last visit.  Discussed med side effects today.    B/p in office today 118/70.  NO acute ha, no blurred vision, no dizziness, no flushing, no chest pain or pressure.      NO other c/o today    The following portions of the patient's history were reviewed and updated as appropriate: allergies, current medications, past family history, past medical history, past social history, past surgical history, and problem list    Review of Systems   Constitutional: Negative for chills, fatigue and fever.   Eyes: Negative for visual disturbance.   Respiratory: Negative for cough and shortness of breath.    Cardiovascular: Negative for chest pain, palpitations and leg swelling.   Gastrointestinal: Negative for abdominal pain, diarrhea, nausea and vomiting.   Musculoskeletal: Positive for arthralgias.   Neurological: Negative for dizziness and  "light-headedness.        Objective   Vital Signs:   Vitals:    05/10/22 1251   BP: 118/70   Pulse: 77   Resp: 18   Temp: 97.5 °F (36.4 °C)   SpO2: 98%   Weight: 47.2 kg (104 lb)   Height: 157 cm (61.81\")        BMI is within normal parameters. No follow-up required.       Physical Exam  Constitutional:       General: She is not in acute distress.     Appearance: Normal appearance. She is normal weight. She is not ill-appearing.   HENT:      Head: Normocephalic.      Nose: Nose normal. No congestion.   Eyes:      Conjunctiva/sclera: Conjunctivae normal.      Pupils: Pupils are equal, round, and reactive to light.   Neck:      Vascular: No carotid bruit.   Cardiovascular:      Rate and Rhythm: Normal rate and regular rhythm.      Pulses: Normal pulses.      Heart sounds: Normal heart sounds. No murmur heard.  Pulmonary:      Effort: Pulmonary effort is normal. No respiratory distress.      Breath sounds: Normal breath sounds. No stridor. No wheezing, rhonchi or rales.   Chest:      Chest wall: No tenderness.   Musculoskeletal:      Cervical back: Neck supple.   Lymphadenopathy:      Cervical: No cervical adenopathy.   Skin:     General: Skin is warm.      Findings: No rash.   Neurological:      Mental Status: She is alert and oriented to person, place, and time.   Psychiatric:         Mood and Affect: Mood normal.         Behavior: Behavior normal.         Thought Content: Thought content normal.         Judgment: Judgment normal.          Result Review :     The following data was reviewed by: CHANTEL Peñaloza on 05/10/2022:           Assessment and Plan    Diagnoses and all orders for this visit:    1. Atypical migraine (Primary)  Comments:  Request refill on Fioricet   Denies medication side effects  Migraines approximately 1-2 time per week-  worse with season change  Stable    2. Osteoporosis, post-menopausal  Comments:  call to set up Prolia injection  last recieved 2020    3. Osteoarthritis of both hands, " unspecified osteoarthritis type  Comments:  Unchanged  may try voltaren gel  continue naproxen as needed-  uses rarely        Follow Up   Return in about 3 months (around 8/10/2022) for Next scheduled follow up.  Patient was given instructions and counseling regarding her condition or for health maintenance advice. Please see specific information pulled into the AVS if appropriate.

## 2022-05-11 RX ORDER — BUTALBITAL, ACETAMINOPHEN, CAFFEINE AND CODEINE PHOSPHATE 50; 325; 40; 30 MG/1; MG/1; MG/1; MG/1
1 CAPSULE ORAL EVERY 6 HOURS PRN
Qty: 50 CAPSULE | Refills: 0 | Status: SHIPPED | OUTPATIENT
Start: 2022-05-11 | End: 2022-07-21 | Stop reason: SDUPTHER

## 2022-07-21 ENCOUNTER — OFFICE VISIT (OUTPATIENT)
Dept: FAMILY MEDICINE CLINIC | Facility: CLINIC | Age: 72
End: 2022-07-21

## 2022-07-21 VITALS
DIASTOLIC BLOOD PRESSURE: 60 MMHG | HEART RATE: 60 BPM | HEIGHT: 62 IN | TEMPERATURE: 98 F | SYSTOLIC BLOOD PRESSURE: 100 MMHG | BODY MASS INDEX: 19.14 KG/M2 | RESPIRATION RATE: 18 BRPM | OXYGEN SATURATION: 96 % | WEIGHT: 104 LBS

## 2022-07-21 DIAGNOSIS — G43.009 ATYPICAL MIGRAINE: ICD-10-CM

## 2022-07-21 DIAGNOSIS — E78.2 MIXED HYPERLIPIDEMIA: Primary | ICD-10-CM

## 2022-07-21 PROCEDURE — 99214 OFFICE O/P EST MOD 30 MIN: CPT | Performed by: NURSE PRACTITIONER

## 2022-07-21 RX ORDER — RIZATRIPTAN BENZOATE 10 MG/1
10 TABLET, ORALLY DISINTEGRATING ORAL ONCE AS NEEDED
Qty: 14 TABLET | Refills: 1 | Status: SHIPPED | OUTPATIENT
Start: 2022-07-21 | End: 2022-12-15 | Stop reason: SDUPTHER

## 2022-07-21 RX ORDER — MULTIVIT-MIN/IRON/FOLIC ACID/K 18-600-40
CAPSULE ORAL
COMMUNITY

## 2022-07-21 RX ORDER — BUTALBITAL, ACETAMINOPHEN, CAFFEINE AND CODEINE PHOSPHATE 50; 325; 40; 30 MG/1; MG/1; MG/1; MG/1
1 CAPSULE ORAL EVERY 6 HOURS PRN
Qty: 50 CAPSULE | Refills: 0 | Status: SHIPPED | OUTPATIENT
Start: 2022-07-21 | End: 2022-07-27 | Stop reason: SDUPTHER

## 2022-07-21 RX ORDER — BUTALBITAL, ACETAMINOPHEN, CAFFEINE AND CODEINE PHOSPHATE 50; 325; 40; 30 MG/1; MG/1; MG/1; MG/1
1 CAPSULE ORAL EVERY 6 HOURS PRN
Qty: 50 CAPSULE | Refills: 0 | Status: CANCELLED | OUTPATIENT
Start: 2022-07-21

## 2022-07-21 NOTE — PROGRESS NOTES
"Chief Complaint  Migraine (Med refills)    Subjective          Orly presents to [facility]  For follow up on Migraines  71 year old female presented today for followup.  History of atypical migraines.  Onset several years ago.  Current Rx- Fioricet.  She takes as needed- 1-3 times a week.  Migraines 2-3 x per week no aura.  No N/V.  No Change in frequency or intensity since last visit.  She did decrease her dose within the last few years.  She has tried several other medications that have not helped with her symptoms.  She denies any medication side effects.  She does supplement with maxalt/naproxen as needed.      She  Is due for a Dexa scan and plans to [facility] to schedule    She has no other c/o today    The following portions of the patient's history were reviewed and updated as appropriate: allergies, current medications, past family history, past medical history, past social history, past surgical history, and problem list        Review of Systems   Constitutional: Negative for chills, fatigue and fever.   Eyes: Negative for photophobia and visual disturbance.   Respiratory: Negative for cough, shortness of breath and wheezing.    Cardiovascular: Negative for chest pain, palpitations and leg swelling.   Neurological: Negative for dizziness and light-headedness.        Objective   Vital Signs:   Vitals:    07/21/22 1420   BP: 100/60   Pulse: 60   Resp: 18   Temp: 98 °F (36.7 °C)   SpO2: 96%   Weight: 47.2 kg (104 lb)   Height: 157 cm (61.81\")        BMI is within normal parameters. No other follow-up for BMI required.        Physical Exam  Vitals reviewed.   Constitutional:       General: She is not in acute distress.  Eyes:      Conjunctiva/sclera: Conjunctivae normal.      Pupils: Pupils are equal, round, and reactive to light.   Neck:      Thyroid: No thyromegaly.      Vascular: No carotid bruit.   Cardiovascular:      Rate and Rhythm: Normal rate and regular " rhythm.      Heart sounds: Normal heart sounds.   Pulmonary:      Effort: Pulmonary effort is normal.      Breath sounds: Normal breath sounds.   Neurological:      Mental Status: She is alert.   Psychiatric:         Attention and Perception: Attention normal.         Mood and Affect: Mood normal.          Result Review :     The following data was reviewed by: CHANTEL Peñaloza on 07/21/2022:  CBC & Differential (02/07/2022 11:01)  Lipid Panel (02/07/2022 11:01)  Vitamin D 25 Hydroxy (02/07/2022 11:01)  T4, Free (02/07/2022 11:01)  TSH (02/07/2022 11:01)      TSH slightly elevated  normal T4  We will continue to monitor-  may be new baseline-  no record for past 4 years        Cholesterol just slightly elevated- no increase in last 4 years     Everything else normal including Vitamin D     Continue current medications      Assessment and Plan    Diagnoses and all orders for this visit:    1. Mixed hyperlipidemia (Primary)  -     Comprehensive Metabolic Panel; Future  -     CBC & Differential; Future  -     TSH; Future  -     T4, Free; Future  -     Lipid Panel; Future    2. Atypical migraine  Comments:  Request refill on Fioricet and Maxalt  Denies medication side effects  Migraines approximately 1 time per week  Stable  Orders:  -     rizatriptan MLT (Maxalt-MLT) 10 MG disintegrating tablet; Place 1 tablet on the tongue 1 (One) Time As Needed for Migraine for up to 1 dose. May repeat in 2 hours if needed  Dispense: 14 tablet; Refill: 1  -     Comprehensive Metabolic Panel; Future  -     CBC & Differential; Future  -     TSH; Future  -     T4, Free; Future  -     Lipid Panel; Future    3. Atypical migraine  -     rizatriptan MLT (Maxalt-MLT) 10 MG disintegrating tablet; Place 1 tablet on the tongue 1 (One) Time As Needed for Migraine for up to 1 dose. May repeat in 2 hours if needed  Dispense: 14 tablet; Refill: 1  -     Comprehensive Metabolic Panel; Future  -     CBC & Differential; Future  -     TSH;  Future  -     T4, Free; Future  -     Lipid Panel; Future      NEVIN query complete. Treatment plan to include limited course of prescribed  controlled substance. Risks including addiction, benefits, and alternatives presented to patient.     Follow Up   Return in about 3 months (around 10/21/2022) for Next scheduled follow up.  Patient was given instructions and counseling regarding her condition or for health maintenance advice. Please see specific information pulled into the AVS if appropriate.

## 2022-07-26 ENCOUNTER — DOCUMENTATION (OUTPATIENT)
Dept: FAMILY MEDICINE CLINIC | Facility: CLINIC | Age: 72
End: 2022-07-26

## 2022-07-26 NOTE — PROGRESS NOTES
I was contacted by the patient as the on-call provider.  The patient stated the Fioricet prescription that was sent in on 07/21/2022 was sent to the wrong pharmacy.  It should have been sent to [facility] on [facility].  I will inform that patient's PCP to alert Dr. Espinal, as this is a controlled substance.

## 2022-07-27 DIAGNOSIS — G43.009 ATYPICAL MIGRAINE: ICD-10-CM

## 2022-07-27 RX ORDER — BUTALBITAL, ACETAMINOPHEN, CAFFEINE AND CODEINE PHOSPHATE 50; 325; 40; 30 MG/1; MG/1; MG/1; MG/1
1 CAPSULE ORAL EVERY 6 HOURS PRN
Qty: 50 CAPSULE | Refills: 0 | Status: SHIPPED | OUTPATIENT
Start: 2022-07-27 | End: 2022-10-04 | Stop reason: SDUPTHER

## 2022-08-16 ENCOUNTER — TELEPHONE (OUTPATIENT)
Dept: FAMILY MEDICINE CLINIC | Facility: CLINIC | Age: 72
End: 2022-08-16

## 2022-08-16 NOTE — TELEPHONE ENCOUNTER
Caller: Orly Rivas    Relationship: Self    Best call back number: [phone]    What orders are you requesting (i.e. lab or imaging): BONE DENSITY TEST    Where will you receive your lab/imaging services:     [facility]  [address] [place] [address]  Phone: [phone]  FAX: [phone]    Additional notes: PATIENT STATES THE DIAGNOSTIC CENTER IS REQUESTING THESE ORDERS BEFORE SHE CAN SCHEDULE HER BONE DENSITY SCAN    PATIENT IS REQUESTING A CALL BACK ONCE ORDERS HAVE BEEN PLACED SO SHE WILL KNOW WHEN TO SCHEDULE.

## 2022-09-30 ENCOUNTER — APPOINTMENT (OUTPATIENT)
Dept: WOMENS IMAGING | Facility: HOSPITAL | Age: 72
End: 2022-09-30

## 2022-09-30 ENCOUNTER — HOSPITAL ENCOUNTER (OUTPATIENT)
Dept: PET IMAGING | Facility: HOSPITAL | Age: 72
Discharge: HOME | End: 2022-09-30
Admitting: NURSE PRACTITIONER

## 2022-09-30 DIAGNOSIS — M81.0 OSTEOPOROSIS, POST-MENOPAUSAL: ICD-10-CM

## 2022-09-30 PROCEDURE — 77080 DXA BONE DENSITY AXIAL: CPT

## 2022-09-30 PROCEDURE — 77080 DXA BONE DENSITY AXIAL: CPT | Performed by: RADIOLOGY

## 2022-10-04 ENCOUNTER — OFFICE VISIT (OUTPATIENT)
Dept: FAMILY MEDICINE CLINIC | Facility: CLINIC | Age: 72
End: 2022-10-04

## 2022-10-04 VITALS
HEART RATE: 58 BPM | DIASTOLIC BLOOD PRESSURE: 60 MMHG | BODY MASS INDEX: 19.69 KG/M2 | OXYGEN SATURATION: 98 % | TEMPERATURE: 97 F | WEIGHT: 107 LBS | HEIGHT: 62 IN | RESPIRATION RATE: 18 BRPM | SYSTOLIC BLOOD PRESSURE: 100 MMHG

## 2022-10-04 DIAGNOSIS — E78.2 MIXED HYPERLIPIDEMIA: ICD-10-CM

## 2022-10-04 DIAGNOSIS — M81.0 AGE-RELATED OSTEOPOROSIS WITHOUT CURRENT PATHOLOGICAL FRACTURE: ICD-10-CM

## 2022-10-04 DIAGNOSIS — G43.009 ATYPICAL MIGRAINE: Primary | ICD-10-CM

## 2022-10-04 DIAGNOSIS — G43.009 ATYPICAL MIGRAINE: ICD-10-CM

## 2022-10-04 PROCEDURE — 99213 OFFICE O/P EST LOW 20 MIN: CPT | Performed by: NURSE PRACTITIONER

## 2022-10-04 NOTE — PROGRESS NOTES
"Chief Complaint  Migraine    Subjective          Orly presents to [facility]  To refill medications and review labs. No medication side effects are reported. Overall the patient feels well.    71-year-old female presents today for follow-up.  She has a history of atypical migraines.  Onset was several years ago.  She is currently taking Fioricet.  She takes this medication as needed 1-3 times per week.  She does have migraines 2-3 times per week with no aura.  She denies any nausea and vomiting.  She has had no change in frequency or intensity since last visit.  She has decreased her dose within the last couple years.  She has been tried on several different medications that have helped with her symptoms.  She denies any medication side effects.  She does supplement with Maxalt/naproxen as needed.    She did have a DEXA scan and plans to continue with her Prolia injections  We will get labs prior to that appointment      She has no other complaints of today    The following portions of the patient's history were reviewed and updated as appropriate: allergies, current medications, past family history, past medical history, past social history, past surgical history, and problem list    Review of Systems   Constitutional: Negative for chills, fatigue and fever.   Eyes: Negative for visual disturbance.   Respiratory: Negative for cough, shortness of breath and wheezing.    Cardiovascular: Negative for chest pain, palpitations and leg swelling.   Neurological: Negative for dizziness and light-headedness.        Objective   Vital Signs:   Vitals:    10/04/22 1308   BP: 100/60   Pulse: 58   Resp: 18   Temp: 97 °F (36.1 °C)   SpO2: 98%   Weight: 48.5 kg (107 lb)   Height: 157 cm (61.81\")        BMI is within normal parameters. No other follow-up for BMI required.        Physical Exam  Vitals reviewed.   Constitutional:       General: She is not in acute distress.  Eyes:      " Conjunctiva/sclera: Conjunctivae normal.   Neck:      Thyroid: No thyromegaly.      Vascular: No carotid bruit.   Cardiovascular:      Rate and Rhythm: Normal rate and regular rhythm.      Heart sounds: Normal heart sounds.   Pulmonary:      Effort: Pulmonary effort is normal.      Breath sounds: Normal breath sounds.   Neurological:      Mental Status: She is alert.   Psychiatric:         Attention and Perception: Attention normal.         Mood and Affect: Mood normal.          Result Review :     The following data was reviewed by: CHANTEL Peñaloza on 10/04/2022:           Assessment and Plan    Diagnoses and all orders for this visit:    1. Atypical migraine (Primary)  Comments:  Controlled continue current management    2. Mixed hyperlipidemia  Comments:  Recheck lipid panel with fasting labs    3. Age-related osteoporosis without current pathological fracture  Comments:  Continue current management  DEXA scan 9/2022        Follow Up   Return in about 3 months (around 1/4/2023) for Next scheduled follow up.  Patient was given instructions and counseling regarding her condition or for health maintenance advice. Please see specific information pulled into the AVS if appropriate.

## 2022-10-05 RX ORDER — BUTALBITAL, ACETAMINOPHEN, CAFFEINE AND CODEINE PHOSPHATE 50; 325; 40; 30 MG/1; MG/1; MG/1; MG/1
1 CAPSULE ORAL EVERY 6 HOURS PRN
Qty: 50 CAPSULE | Refills: 0 | Status: SHIPPED | OUTPATIENT
Start: 2022-10-05 | End: 2022-12-15 | Stop reason: SDUPTHER

## 2022-12-15 ENCOUNTER — OFFICE VISIT (OUTPATIENT)
Dept: FAMILY MEDICINE CLINIC | Facility: CLINIC | Age: 72
End: 2022-12-15

## 2022-12-15 VITALS
TEMPERATURE: 97.6 F | RESPIRATION RATE: 16 BRPM | DIASTOLIC BLOOD PRESSURE: 64 MMHG | WEIGHT: 102 LBS | BODY MASS INDEX: 18.77 KG/M2 | SYSTOLIC BLOOD PRESSURE: 103 MMHG | HEART RATE: 67 BPM | OXYGEN SATURATION: 94 % | HEIGHT: 62 IN

## 2022-12-15 DIAGNOSIS — E78.2 MIXED HYPERLIPIDEMIA: Primary | ICD-10-CM

## 2022-12-15 DIAGNOSIS — G43.009 ATYPICAL MIGRAINE: ICD-10-CM

## 2022-12-15 PROCEDURE — 99214 OFFICE O/P EST MOD 30 MIN: CPT | Performed by: NURSE PRACTITIONER

## 2022-12-15 RX ORDER — RIZATRIPTAN BENZOATE 10 MG/1
10 TABLET, ORALLY DISINTEGRATING ORAL ONCE AS NEEDED
Qty: 14 TABLET | Refills: 1 | Status: SHIPPED | OUTPATIENT
Start: 2022-12-15

## 2022-12-15 RX ORDER — BUTALBITAL, ACETAMINOPHEN, CAFFEINE AND CODEINE PHOSPHATE 50; 325; 40; 30 MG/1; MG/1; MG/1; MG/1
1 CAPSULE ORAL EVERY 6 HOURS PRN
Qty: 50 CAPSULE | Refills: 0 | Status: CANCELLED | OUTPATIENT
Start: 2022-12-15

## 2022-12-15 RX ORDER — BUTALBITAL, ACETAMINOPHEN, CAFFEINE AND CODEINE PHOSPHATE 50; 325; 40; 30 MG/1; MG/1; MG/1; MG/1
1 CAPSULE ORAL EVERY 6 HOURS PRN
Qty: 50 CAPSULE | Refills: 0 | Status: SHIPPED | OUTPATIENT
Start: 2022-12-15 | End: 2022-12-28 | Stop reason: SDUPTHER

## 2022-12-15 NOTE — PROGRESS NOTES
"Chief Complaint  Migraine    Subjective          Orly presents to [facility]     As a 72 year old female for follow up on migraines. To refill medications and review labs. No medication side effects are reported. Overall the patient feels well.     She has a history of atypical migraines.  Onset was several years ago.  She is currently taking Fioricet.  She takes this medication as needed 1-3 times per week.  She does have migraines 2-3 times per week with no aura.    She denies any nausea and vomiting.  She has had no change in frequency or intensity since last visit.  She has decreased her dose within the last couple years.  She has been tried on several different medications that have helped with her symptoms.  She denies any medication side effects.  She does supplement with Maxalt/naproxen as needed.    She has no other c/o today    The following portions of the patient's history were reviewed and updated as appropriate: allergies, current medications, past family history, past medical history, past social history, past surgical history, and problem list    Review of Systems   Constitutional: Negative for chills, fatigue and fever.   Respiratory: Negative for cough, shortness of breath and wheezing.    Cardiovascular: Negative for chest pain, palpitations and leg swelling.   Gastrointestinal: Negative for abdominal pain, diarrhea, nausea and vomiting.   Neurological: Negative for dizziness and light-headedness.   Psychiatric/Behavioral: Negative for self-injury and suicidal ideas.        Objective   Vital Signs:   Vitals:    12/15/22 1427   BP: 103/64   Pulse: 67   Resp: 16   Temp: 97.6 °F (36.4 °C)   TempSrc: Oral   SpO2: 94%   Weight: 46.3 kg (102 lb)   Height: 157 cm (61.81\")        BMI is within normal parameters. No other follow-up for BMI required.        Physical Exam  Vitals reviewed.   Constitutional:       General: She is not in acute distress.  Eyes:      " Conjunctiva/sclera: Conjunctivae normal.   Neck:      Thyroid: No thyromegaly.      Vascular: No carotid bruit.   Cardiovascular:      Rate and Rhythm: Normal rate and regular rhythm.      Heart sounds: Normal heart sounds.   Pulmonary:      Effort: Pulmonary effort is normal.      Breath sounds: Normal breath sounds.   Neurological:      Mental Status: She is alert.   Psychiatric:         Attention and Perception: Attention normal.         Mood and Affect: Mood normal.          Result Review :     The following data was reviewed by: CHANTEL Peñaloza on 12/15/2022:           Assessment and Plan    Diagnoses and all orders for this visit:    1. Mixed hyperlipidemia (Primary)  Comments:  recheck labs    Orders:  -     Comprehensive metabolic panel  -     Lipid panel  -     CBC & Differential  -     Lipid Panel  -     TSH  -     T4, Free    2. Atypical migraine  Comments:  Request refill on Fioricet and Maxalt  Denies medication side effects  Migraines approximately 1 time per week  Stable  Orders:  -     rizatriptan MLT (Maxalt-MLT) 10 MG disintegrating tablet; Place 1 tablet on the tongue 1 (One) Time As Needed for Migraine for up to 1 dose. May repeat in 2 hours if needed  Dispense: 14 tablet; Refill: 1  -     Comprehensive metabolic panel  -     Lipid panel  -     CBC & Differential  -     Lipid Panel  -     TSH  -     T4, Free        Follow Up   Return in about 3 months (around 3/15/2023), or if symptoms worsen or fail to improve.  Patient was given instructions and counseling regarding her condition or for health maintenance advice. Please see specific information pulled into the AVS if appropriate.

## 2022-12-15 NOTE — TELEPHONE ENCOUNTER
NEVIN query complete. Treatment plan to include limited course of prescribed  controlled substance. Risks including addiction, benefits, and alternatives presented to patient.     LOV: 12/15/2022  Last Refill:10/05/2022

## 2022-12-16 ENCOUNTER — TELEPHONE (OUTPATIENT)
Dept: FAMILY MEDICINE CLINIC | Facility: CLINIC | Age: 72
End: 2022-12-16

## 2022-12-16 DIAGNOSIS — G43.009 ATYPICAL MIGRAINE: ICD-10-CM

## 2022-12-16 RX ORDER — BUTALBITAL, ACETAMINOPHEN, CAFFEINE AND CODEINE PHOSPHATE 50; 325; 40; 30 MG/1; MG/1; MG/1; MG/1
1 CAPSULE ORAL EVERY 6 HOURS PRN
Qty: 50 CAPSULE | Refills: 0 | Status: CANCELLED | OUTPATIENT
Start: 2022-12-16

## 2022-12-16 NOTE — TELEPHONE ENCOUNTER
Caller: Orly Rivas    Relationship: Self    Best call back number: [phone]    Requested Prescriptions:   Requested Prescriptions     Pending Prescriptions Disp Refills   • butalbital-acetaminophen-caffeine-codeine (FIORICET WITH CODEINE) -21-30 MG per capsule 50 capsule 0     Sig: Take 1 capsule by mouth Every 6 (Six) Hours As Needed for Headache.        Pharmacy where request should be sent: [facility] #[id number] - [place], [place] - [address] - [phone] [facility] [phone] FX     Additional details provided by patient: WAS SENT TO [facility]. PLEASE SEND TO [facility]    Does the patient have less than a 3 day supply:  [] Yes  [x] No    Would you like a call back once the refill request has been completed: [x] Yes [] No    If the office needs to give you a call back, can they leave a voicemail: [x] Yes [] No    Evette Fraire Rep   12/16/22 15:14 EST

## 2022-12-21 DIAGNOSIS — G43.009 ATYPICAL MIGRAINE: ICD-10-CM

## 2022-12-22 RX ORDER — BUTALBITAL, ACETAMINOPHEN, CAFFEINE AND CODEINE PHOSPHATE 50; 325; 40; 30 MG/1; MG/1; MG/1; MG/1
1 CAPSULE ORAL EVERY 6 HOURS PRN
Qty: 50 CAPSULE | Refills: 0 | OUTPATIENT
Start: 2022-12-22

## 2022-12-24 DIAGNOSIS — G43.009 ATYPICAL MIGRAINE: ICD-10-CM

## 2022-12-26 RX ORDER — BUTALBITAL, ACETAMINOPHEN, CAFFEINE AND CODEINE PHOSPHATE 50; 325; 40; 30 MG/1; MG/1; MG/1; MG/1
1 CAPSULE ORAL EVERY 6 HOURS PRN
Qty: 50 CAPSULE | Refills: 0 | OUTPATIENT
Start: 2022-12-26

## 2022-12-28 DIAGNOSIS — G43.009 ATYPICAL MIGRAINE: ICD-10-CM

## 2022-12-28 RX ORDER — BUTALBITAL, ACETAMINOPHEN, CAFFEINE AND CODEINE PHOSPHATE 50; 325; 40; 30 MG/1; MG/1; MG/1; MG/1
1 CAPSULE ORAL EVERY 6 HOURS PRN
Qty: 50 CAPSULE | Refills: 0 | Status: SHIPPED | OUTPATIENT
Start: 2022-12-28 | End: 2023-03-08 | Stop reason: SDUPTHER

## 2022-12-28 RX ORDER — BUTALBITAL, ACETAMINOPHEN, CAFFEINE AND CODEINE PHOSPHATE 50; 325; 40; 30 MG/1; MG/1; MG/1; MG/1
1 CAPSULE ORAL EVERY 6 HOURS PRN
Qty: 50 CAPSULE | Refills: 0 | Status: CANCELLED | OUTPATIENT
Start: 2022-12-28

## 2023-03-08 ENCOUNTER — OFFICE VISIT (OUTPATIENT)
Dept: FAMILY MEDICINE CLINIC | Facility: CLINIC | Age: 73
End: 2023-03-08
Payer: MEDICARE

## 2023-03-08 VITALS
HEART RATE: 63 BPM | SYSTOLIC BLOOD PRESSURE: 115 MMHG | HEIGHT: 62 IN | DIASTOLIC BLOOD PRESSURE: 68 MMHG | BODY MASS INDEX: 18.99 KG/M2 | TEMPERATURE: 98.3 F | WEIGHT: 103.2 LBS | OXYGEN SATURATION: 97 % | RESPIRATION RATE: 16 BRPM

## 2023-03-08 DIAGNOSIS — E78.2 MIXED HYPERLIPIDEMIA: ICD-10-CM

## 2023-03-08 DIAGNOSIS — G43.009 ATYPICAL MIGRAINE: Primary | ICD-10-CM

## 2023-03-08 PROCEDURE — 99213 OFFICE O/P EST LOW 20 MIN: CPT | Performed by: NURSE PRACTITIONER

## 2023-03-08 RX ORDER — BUTALBITAL, ACETAMINOPHEN, CAFFEINE AND CODEINE PHOSPHATE 50; 325; 40; 30 MG/1; MG/1; MG/1; MG/1
1 CAPSULE ORAL EVERY 6 HOURS PRN
Qty: 50 CAPSULE | Refills: 0 | Status: SHIPPED | OUTPATIENT
Start: 2023-03-08 | End: 2023-04-07

## 2023-03-08 NOTE — PROGRESS NOTES
"Chief Complaint  Med Refill    Subjective          Orly presents to [facility]   As a 72 year old female to follow up on migraines and for medication refill.    She has a history of atypical migraines.  Onset was several years ago.  She is currently taking Fioricet.  She takes this medication as needed 1-3 times per week.  She does have migraines 2-3 times per week with no aura.    She denies any nausea and vomiting.  She has had no change in frequency or intensity since last visit.  She has decreased her dose within the last couple years.  She has been tried on several different medications that have helped with her symptoms.  She denies any medication side effects.  She does supplement with Maxalt/naproxen as needed.  Slight increase in symptoms the past 3 months with the weather change and increased stress with the holidays    No other acute C/o today    The following portions of the patient's history were reviewed and updated as appropriate: allergies, current medications, past family history, past medical history, past social history, past surgical history, and problem list               Review of Systems   Constitutional: Negative for chills, fatigue and fever.   Eyes: Negative for visual disturbance.   Respiratory: Negative for cough, shortness of breath and wheezing.    Cardiovascular: Negative for chest pain, palpitations and leg swelling.   Gastrointestinal: Negative for abdominal pain, diarrhea, nausea and vomiting.   Skin: Negative for rash.   Neurological: Negative for dizziness and light-headedness.   Psychiatric/Behavioral: Negative for self-injury. The patient is not nervous/anxious.         Objective   Vital Signs:   Vitals:    03/08/23 1303   BP: 115/68   BP Location: Left arm   Pulse: 63   Resp: 16   Temp: 98.3 °F (36.8 °C)   TempSrc: Oral   SpO2: 97%   Weight: 46.8 kg (103 lb 3.2 oz)   Height: 157 cm (61.81\")        BMI is within normal parameters. No other " follow-up for BMI required.        Physical Exam  Vitals reviewed.   Constitutional:       General: She is not in acute distress.  Eyes:      Conjunctiva/sclera: Conjunctivae normal.   Neck:      Thyroid: No thyromegaly.      Vascular: No carotid bruit.   Cardiovascular:      Rate and Rhythm: Normal rate and regular rhythm.      Heart sounds: Normal heart sounds.   Pulmonary:      Effort: Pulmonary effort is normal. No respiratory distress.      Breath sounds: Normal breath sounds. No stridor. No wheezing, rhonchi or rales.   Chest:      Chest wall: No tenderness.   Neurological:      Mental Status: She is alert.   Psychiatric:         Attention and Perception: Attention normal.         Mood and Affect: Mood normal.          Result Review :     The following data was reviewed by: CHANTEL Peñaloza on 03/08/2023:           Assessment and Plan    Diagnoses and all orders for this visit:    1. Atypical migraine (Primary)  Comments:  request refill on Fioricet   Denies medication side effects  Migraines approximately 1 time per week  Stable  Orders:  -     butalbital-acetaminophen-caffeine-codeine (FIORICET WITH CODEINE) -52-30 MG per capsule; Take 1 capsule by mouth Every 6 (Six) Hours As Needed for Headache for up to 30 days.  Dispense: 50 capsule; Refill: 0    2. Mixed hyperlipidemia  Comments:  continue to work on lower cholesterol diet and exercise  recheck labs with next visit-  ordered          Follow Up   Return in about 3 months (around 6/8/2023) for Next scheduled follow up.  Patient was given instructions and counseling regarding her condition or for health maintenance advice. Please see specific information pulled into the AVS if appropriate.

## 2023-03-08 NOTE — PROGRESS NOTES
"Chief Complaint  Med Refill    Subjective     {Problem List  Visit Diagnosis  Review (Popup)  Encounters  Notes  Medications  Labs  Result Review Imaging  Media :23}     Orly presents to [facility] for            Objective   Vital Signs:   Vitals:    03/08/23 1303   Weight: 46.8 kg (103 lb 3.2 oz)   Height: 157 cm (61.81\")        BMI is within normal parameters. No other follow-up for BMI required.        Physical Exam   {DIABETIC FOOT EXAM FOR  (Optional):83652::\"Diabetic Foot Exam Performed\":0}  Result Review :{Labs  Result Review  Imaging  Med Tab  Media :23}     {The following data was reviewed by (Optional):10934}           Assessment and Plan {Wrapup  Problem List  Visit Diagnosis  ROS  Review (Popup)  Health Maintenance  Quality  BestPractice  Medications  SmartSets  SnapShot Encounters  Media :23}   There are no diagnoses linked to this encounter.  {Time Spent (Optional):21457}  Follow Up {Wrapup  Review (Popup  Communications :23}  No follow-ups on file.  Patient was given instructions and counseling regarding her condition or for health maintenance advice. Please see specific information pulled into the AVS if appropriate.   "

## 2023-05-25 ENCOUNTER — OFFICE VISIT (OUTPATIENT)
Dept: FAMILY MEDICINE CLINIC | Facility: CLINIC | Age: 73
End: 2023-05-25
Payer: MEDICARE

## 2023-05-25 VITALS
SYSTOLIC BLOOD PRESSURE: 119 MMHG | HEART RATE: 69 BPM | WEIGHT: 102 LBS | HEIGHT: 62 IN | OXYGEN SATURATION: 98 % | DIASTOLIC BLOOD PRESSURE: 69 MMHG | BODY MASS INDEX: 18.77 KG/M2 | TEMPERATURE: 98.2 F

## 2023-05-25 DIAGNOSIS — G43.009 ATYPICAL MIGRAINE: Primary | ICD-10-CM

## 2023-05-25 DIAGNOSIS — E78.2 MIXED HYPERLIPIDEMIA: ICD-10-CM

## 2023-05-25 DIAGNOSIS — M81.0 AGE-RELATED OSTEOPOROSIS WITHOUT CURRENT PATHOLOGICAL FRACTURE: ICD-10-CM

## 2023-05-25 PROCEDURE — 99214 OFFICE O/P EST MOD 30 MIN: CPT | Performed by: NURSE PRACTITIONER

## 2023-05-25 RX ORDER — BUTALBITAL, ACETAMINOPHEN AND CAFFEINE 50; 325; 40 MG/1; MG/1; MG/1
1 TABLET ORAL EVERY 4 HOURS PRN
COMMUNITY
End: 2023-05-25 | Stop reason: SDUPTHER

## 2023-05-25 RX ORDER — BUTALBITAL, ACETAMINOPHEN AND CAFFEINE 50; 325; 40 MG/1; MG/1; MG/1
1 TABLET ORAL EVERY 4 HOURS PRN
Qty: 50 TABLET | Refills: 0 | Status: SHIPPED | OUTPATIENT
Start: 2023-05-25 | End: 2023-06-24

## 2023-05-25 NOTE — PROGRESS NOTES
"Chief Complaint  Migraine    Subjective          Orly presents to [facility] as a 72-year-old female for follow-up on migraines, to obtain labs, refill medications.  Overall doing well      She has a history of atypical migraines.  Onset was several years ago.  She is currently taking Fioricet.  She takes this medication as needed 1-3 times per week.  She does have migraines 2-3 times per week with no aura.    She denies any nausea and vomiting.  She has had slight increase in frequency with seasonal allergies no change in intensity since last visit.  She has decreased her dose within the last couple years.  She has been tried on several different medications that have helped with her symptoms.  She denies any medication side effects.  She does supplement with Maxalt/naproxen as needed.  Slight increase in symptoms the past 3 months with the weather change and increased stress with the holidays    She is fasting and agreeable to labs today    She has no other acute complaints of    The following portions of the patient's history were reviewed and updated as appropriate: allergies, current medications, past family history, past medical history, past social history, past surgical history, and problem list    Review of Systems   Constitutional: Negative for chills, fatigue and fever.   Eyes: Negative for visual disturbance.   Respiratory: Negative for cough, shortness of breath and wheezing.    Cardiovascular: Negative for chest pain, palpitations and leg swelling.   Gastrointestinal: Negative for abdominal pain, diarrhea, nausea and vomiting.   Skin: Negative for rash.   Neurological: Negative for dizziness and light-headedness.   Psychiatric/Behavioral: Negative for self-injury and suicidal ideas.        Objective   Vital Signs:   Vitals:    05/25/23 1126   BP: 119/69   Pulse: 69   Temp: 98.2 °F (36.8 °C)   SpO2: 98%   Weight: 46.3 kg (102 lb)   Height: 157 cm (61.81\")        BMI is " within normal parameters. No other follow-up for BMI required.        Physical Exam  Vitals reviewed.   Constitutional:       General: She is not in acute distress.  Eyes:      Conjunctiva/sclera: Conjunctivae normal.   Neck:      Thyroid: No thyromegaly.      Vascular: No carotid bruit.   Cardiovascular:      Rate and Rhythm: Normal rate and regular rhythm.      Heart sounds: Normal heart sounds.   Pulmonary:      Effort: Pulmonary effort is normal. No respiratory distress.      Breath sounds: Normal breath sounds. No stridor. No wheezing, rhonchi or rales.   Chest:      Chest wall: No tenderness.   Lymphadenopathy:      Cervical: No cervical adenopathy.   Neurological:      Mental Status: She is alert.   Psychiatric:         Attention and Perception: Attention normal.         Mood and Affect: Mood normal.          Result Review :     The following data was reviewed by: CHANTEL Peñaloza on 05/25/2023:           Assessment and Plan    Diagnoses and all orders for this visit:    1. Atypical migraine (Primary)  Comments:  Controlled continue current management  Report any increase or changes in symptoms  Orders:  -     Comprehensive Metabolic Panel  -     CBC & Differential  -     Lipid Panel  -     TSH  -     T4, Free  -     Vitamin D,25-Hydroxy  -     butalbital-acetaminophen-caffeine (FIORICET, ESGIC) -40 MG per tablet; Take 1 tablet by mouth Every 4 (Four) Hours As Needed for Headache for up to 30 days.  Dispense: 50 tablet; Refill: 0    2. Mixed hyperlipidemia  Comments:  Recheck lipid panel  Continue to work on low-cholesterol diet and exercise as tolerated  Orders:  -     Comprehensive Metabolic Panel  -     CBC & Differential  -     Lipid Panel  -     TSH  -     T4, Free  -     Vitamin D,25-Hydroxy    3. Age-related osteoporosis without current pathological fracture  Comments:  Continue current management up-to-date on DEXA scan  Orders:  -     Comprehensive Metabolic Panel  -     CBC &  Differential  -     Lipid Panel  -     TSH  -     T4, Free  -     Vitamin D,25-Hydroxy        Follow Up   Return in about 3 months (around 8/25/2023) for Next scheduled follow up.  Patient was given instructions and counseling regarding her condition or for health maintenance advice. Please see specific information pulled into the AVS if appropriate.

## 2023-05-31 DIAGNOSIS — G43.009 ATYPICAL MIGRAINE: ICD-10-CM

## 2023-05-31 LAB
25(OH)D3+25(OH)D2 SERPL-MCNC: 75.1 NG/ML (ref 30–100)
ALBUMIN SERPL-MCNC: 4.3 G/DL (ref 3.7–4.7)
ALBUMIN/GLOB SERPL: 1.8 {RATIO} (ref 1.2–2.2)
ALP SERPL-CCNC: 67 IU/L (ref 44–121)
ALT SERPL-CCNC: 15 IU/L (ref 0–32)
AST SERPL-CCNC: 24 IU/L (ref 0–40)
BASOPHILS # BLD AUTO: 0.1 X10E3/UL (ref 0–0.2)
BASOPHILS NFR BLD AUTO: 2 %
BILIRUB SERPL-MCNC: 0.3 MG/DL (ref 0–1.2)
BUN SERPL-MCNC: 15 MG/DL (ref 8–27)
BUN/CREAT SERPL: 20 (ref 12–28)
CALCIUM SERPL-MCNC: 9.5 MG/DL (ref 8.7–10.3)
CHLORIDE SERPL-SCNC: 106 MMOL/L (ref 96–106)
CHOLEST SERPL-MCNC: 191 MG/DL (ref 100–199)
CO2 SERPL-SCNC: 24 MMOL/L (ref 20–29)
CREAT SERPL-MCNC: 0.74 MG/DL (ref 0.57–1)
EGFRCR SERPLBLD CKD-EPI 2021: 86 ML/MIN/1.73
EOSINOPHIL # BLD AUTO: 0.1 X10E3/UL (ref 0–0.4)
EOSINOPHIL NFR BLD AUTO: 3 %
ERYTHROCYTE [DISTWIDTH] IN BLOOD BY AUTOMATED COUNT: 12.4 % (ref 11.7–15.4)
GLOBULIN SER CALC-MCNC: 2.4 G/DL (ref 1.5–4.5)
GLUCOSE SERPL-MCNC: 90 MG/DL (ref 70–99)
HCT VFR BLD AUTO: 41.9 % (ref 34–46.6)
HDLC SERPL-MCNC: 64 MG/DL
HGB BLD-MCNC: 14.1 G/DL (ref 11.1–15.9)
IMM GRANULOCYTES # BLD AUTO: 0 X10E3/UL (ref 0–0.1)
IMM GRANULOCYTES NFR BLD AUTO: 0 %
LDLC SERPL CALC-MCNC: 111 MG/DL (ref 0–99)
LYMPHOCYTES # BLD AUTO: 1.8 X10E3/UL (ref 0.7–3.1)
LYMPHOCYTES NFR BLD AUTO: 39 %
MCH RBC QN AUTO: 30.7 PG (ref 26.6–33)
MCHC RBC AUTO-ENTMCNC: 33.7 G/DL (ref 31.5–35.7)
MCV RBC AUTO: 91 FL (ref 79–97)
MONOCYTES # BLD AUTO: 0.5 X10E3/UL (ref 0.1–0.9)
MONOCYTES NFR BLD AUTO: 11 %
NEUTROPHILS # BLD AUTO: 2.2 X10E3/UL (ref 1.4–7)
NEUTROPHILS NFR BLD AUTO: 45 %
PLATELET # BLD AUTO: 233 X10E3/UL (ref 150–450)
POTASSIUM SERPL-SCNC: 4.4 MMOL/L (ref 3.5–5.2)
PROT SERPL-MCNC: 6.7 G/DL (ref 6–8.5)
RBC # BLD AUTO: 4.59 X10E6/UL (ref 3.77–5.28)
SODIUM SERPL-SCNC: 142 MMOL/L (ref 134–144)
T4 FREE SERPL-MCNC: 1.12 NG/DL (ref 0.82–1.77)
TRIGL SERPL-MCNC: 90 MG/DL (ref 0–149)
TSH SERPL DL<=0.005 MIU/L-ACNC: 3.04 UIU/ML (ref 0.45–4.5)
VLDLC SERPL CALC-MCNC: 16 MG/DL (ref 5–40)
WBC # BLD AUTO: 4.7 X10E3/UL (ref 3.4–10.8)

## 2023-05-31 RX ORDER — BUTALBITAL, ACETAMINOPHEN AND CAFFEINE 300; 40; 50 MG/1; MG/1; MG/1
1 CAPSULE ORAL EVERY 4 HOURS PRN
Qty: 50 CAPSULE | Refills: 0 | Status: SHIPPED | OUTPATIENT
Start: 2023-05-31 | End: 2023-06-30

## 2023-05-31 RX ORDER — BUTALBITAL, ACETAMINOPHEN AND CAFFEINE 50; 325; 40 MG/1; MG/1; MG/1
1 TABLET ORAL EVERY 4 HOURS PRN
Qty: 50 TABLET | Refills: 0 | Status: CANCELLED | OUTPATIENT
Start: 2023-05-31 | End: 2023-06-30

## 2023-05-31 NOTE — TELEPHONE ENCOUNTER
Caller: Orly Rivas    Relationship: Self    Best call back number: [phone]     Requested Prescriptions:   Requested Prescriptions     Pending Prescriptions Disp Refills   • butalbital-acetaminophen-caffeine (FIORICET, ESGIC) -40 MG per tablet 50 tablet 0     Sig: Take 1 tablet by mouth Every 4 (Four) Hours As Needed for Headache for up to 30 days.        Pharmacy where request should be sent: [facility] #[id number] - [place], [place] - [address] .  [phone] [facility] [phone]      Last office visit with prescribing clinician: 5/25/2023   Last telemedicine visit with prescribing clinician: Visit date not found   Next office visit with prescribing clinician: Visit date not found     Additional details provided by patient: PHARMACY IS STATING THAT THEY DO NOT HAVE TABLETS IN STOCK, BUT THEY DO HAVE CAPSULES.    THE PATIENT IS CONFUSED BECAUSE SHE'S ALWAYS BEEN ON CAPSULES AND DOESN'T KNOW WHY IT WAS SWITCHED TO TABLETS.    Does the patient have less than a 3 day supply:  [x] Yes  [] No    Would you like a call back once the refill request has been completed: [x] Yes [] No    If the office needs to give you a call back, can they leave a voicemail: [] Yes [x] No    Evette Jamil Rep   05/31/23 13:55 EDT

## 2023-05-31 NOTE — TELEPHONE ENCOUNTER
Please advise. PHARMACY IS STATING THAT THEY DO NOT HAVE TABLETS IN STOCK, BUT THEY DO HAVE CAPSULES. Thanks

## 2023-06-02 ENCOUNTER — TELEPHONE (OUTPATIENT)
Dept: FAMILY MEDICINE CLINIC | Facility: CLINIC | Age: 73
End: 2023-06-02

## 2023-06-02 NOTE — TELEPHONE ENCOUNTER
I called pt pt did not answer. I LM on pt vm to return call.      Hub okay to give pt message. Thanks

## 2023-06-02 NOTE — TELEPHONE ENCOUNTER
----- Message from CHANTEL Peñaloza sent at 6/2/2023  1:11 PM EDT -----  I have reviewed all lab results which are normal or stable. Please inform the patient.

## 2023-07-27 ENCOUNTER — OFFICE VISIT (OUTPATIENT)
Dept: FAMILY MEDICINE CLINIC | Facility: CLINIC | Age: 73
End: 2023-07-27
Payer: MEDICARE

## 2023-07-27 VITALS
HEART RATE: 73 BPM | HEIGHT: 62 IN | OXYGEN SATURATION: 98 % | WEIGHT: 105 LBS | SYSTOLIC BLOOD PRESSURE: 122 MMHG | RESPIRATION RATE: 16 BRPM | TEMPERATURE: 97.4 F | DIASTOLIC BLOOD PRESSURE: 69 MMHG | BODY MASS INDEX: 19.32 KG/M2

## 2023-07-27 DIAGNOSIS — M25.511 ACUTE PAIN OF BOTH SHOULDERS: ICD-10-CM

## 2023-07-27 DIAGNOSIS — M81.0 OSTEOPOROSIS, POST-MENOPAUSAL: ICD-10-CM

## 2023-07-27 DIAGNOSIS — G43.009 ATYPICAL MIGRAINE: Primary | ICD-10-CM

## 2023-07-27 DIAGNOSIS — M79.632 PAIN IN BOTH FOREARMS: ICD-10-CM

## 2023-07-27 DIAGNOSIS — M79.631 PAIN IN BOTH FOREARMS: ICD-10-CM

## 2023-07-27 DIAGNOSIS — M25.512 ACUTE PAIN OF BOTH SHOULDERS: ICD-10-CM

## 2023-07-27 PROCEDURE — 99214 OFFICE O/P EST MOD 30 MIN: CPT | Performed by: NURSE PRACTITIONER

## 2023-07-27 RX ORDER — BUTALBITAL, ACETAMINOPHEN AND CAFFEINE 300; 40; 50 MG/1; MG/1; MG/1
1 CAPSULE ORAL EVERY 4 HOURS PRN
Qty: 50 CAPSULE | Refills: 0 | Status: SHIPPED | OUTPATIENT
Start: 2023-07-27 | End: 2023-08-26

## 2023-07-27 RX ORDER — RIZATRIPTAN BENZOATE 10 MG/1
10 TABLET, ORALLY DISINTEGRATING ORAL ONCE AS NEEDED
Qty: 14 TABLET | Refills: 1 | Status: SHIPPED | OUTPATIENT
Start: 2023-07-27

## 2023-07-27 RX ORDER — NAPROXEN 375 MG/1
375 TABLET ORAL 2 TIMES DAILY PRN
Qty: 90 TABLET | Refills: 1 | Status: SHIPPED | OUTPATIENT
Start: 2023-07-27

## 2023-07-27 NOTE — PROGRESS NOTES
Chief Complaint  Muscle Pain    Mya Villalba presents to [facility] as a 72-year-old female complaining of some ongoing muscle pain and cramping in her shoulders and forearms.  She states that she does sleep on her side and thought that the pain was related to this.  It is worse with any heavy lifting or crossing her arms or after doing repetitive motions.  She has not tried any over-the-counter medication for this complaint of.  No known injury or swelling or bruising  She has tried to start taking a over-the-counter supplement for bone and muscle health magnesium  She just started taking this medicine and has not noticed a change in any signs or symptoms    She would also like to follow-up for her migraines  She has a history of atypical migraines.  Onset was several years ago.  She is currently taking Fioricet.  She takes this medication as needed 1-3 times per week.  She does have migraines 2-3 times per week with no aura.    She denies any nausea and vomiting.  She has had slight increase in frequency with seasonal allergies no change in intensity since last visit.  She has decreased her dose within the last couple years.  She has been tried on several different medications that have helped with her symptoms.  She denies any medication side effects.  She does supplement with Maxalt/naproxen as needed.  No changes in symptoms since last visit    She did have recent labs 5/2023 with no major concerns    She has no other acute complaints at today    The following portions of the patient's history were reviewed and updated as appropriate: allergies, current medications, past family history, past medical history, past social history, past surgical history, and problem list  Review of Systems   Constitutional:  Negative for chills, fatigue and fever.   Eyes:  Negative for visual disturbance.   Respiratory:  Negative for cough, shortness of breath and wheezing.   "  Cardiovascular:  Negative for chest pain, palpitations and leg swelling.   Gastrointestinal:  Negative for abdominal pain, diarrhea, nausea and vomiting.   Musculoskeletal:  Positive for arthralgias and myalgias.   Neurological:  Negative for dizziness and light-headedness.      Objective   Vital Signs:   Vitals:    07/27/23 1256   BP: 122/69   Pulse: 73   Resp: 16   Temp: 97.4 °F (36.3 °C)   TempSrc: Skin   SpO2: 98%   Weight: 47.6 kg (105 lb)   Height: 157 cm (61.81\")        BMI is within normal parameters. No other follow-up for BMI required.        Physical Exam  Vitals reviewed.   Constitutional:       General: She is not in acute distress.  Eyes:      Conjunctiva/sclera: Conjunctivae normal.   Neck:      Thyroid: No thyromegaly.      Vascular: No carotid bruit.   Cardiovascular:      Rate and Rhythm: Normal rate and regular rhythm.      Heart sounds: Normal heart sounds.   Pulmonary:      Effort: Pulmonary effort is normal. No respiratory distress.      Breath sounds: Normal breath sounds. No stridor. No wheezing, rhonchi or rales.   Chest:      Chest wall: No tenderness.   Musculoskeletal:      Right shoulder: Crepitus present. No swelling, deformity, effusion, laceration, tenderness or bony tenderness. Normal range of motion. Normal strength. Normal pulse.      Left shoulder: Crepitus present. No swelling, deformity, effusion, laceration, tenderness or bony tenderness. Normal range of motion. Normal strength. Normal pulse.        Arms:    Neurological:      Mental Status: She is alert.   Psychiatric:         Attention and Perception: Attention normal.         Mood and Affect: Mood normal.        Result Review :     The following data was reviewed by: CHANTEL Peñaloza on 07/27/2023:  Vitamin D,25-Hydroxy (05/30/2023 10:58)  T4, Free (05/30/2023 10:58)  TSH (05/30/2023 10:58)  Lipid Panel (05/30/2023 10:58)  CBC & Differential (05/30/2023 10:58)  Comprehensive Metabolic Panel (05/30/2023 10:58)       I " have reviewed all lab results which are normal or stable. Please inform the patient     Assessment and Plan    Diagnoses and all orders for this visit:    1. Atypical migraine (Primary)  Comments:  Controlled continue current management  Report any increase or changes in symptoms  Orders:  -     butalbital-acetaminophen-caffeine (Fioricet) -40 MG capsule capsule; Take 1 capsule by mouth Every 4 (Four) Hours As Needed (headache) for up to 30 days.  Dispense: 50 capsule; Refill: 0  -     rizatriptan MLT (Maxalt-MLT) 10 MG disintegrating tablet; Place 1 tablet on the tongue 1 (One) Time As Needed for Migraine for up to 28 doses. May repeat in 2 hours if needed  Dispense: 14 tablet; Refill: 1  -     naproxen (Naprosyn) 375 MG tablet; Take 1 tablet by mouth 2 (Two) Times a Day As Needed for Mild Pain.  Dispense: 90 tablet; Refill: 1    2. Acute pain of both shoulders  Comments:  Try Rx alternative  Topical cream  Follow-up with any worsening symptoms or no improvement  Alternate ice and heat  xrays/pt if no improvment    3. Pain in both forearms  Comments:  Try Rx alternative  Topical cream  Follow-up with any worsening symptoms or no improvement  Alternate ice and heat    4. Osteoporosis, post-menopausal  Comments:  Discussed pro[facility]  plans to call and schedule  dexa due next year    NEVIN query complete. Treatment plan to include limited course of prescribed  controlled substance. Risks including addiction, benefits, and alternatives presented to patient.       Follow Up   Return in about 3 months (around 10/27/2023).  Patient was given instructions and counseling regarding her condition or for health maintenance advice. Please see specific information pulled into the AVS if appropriate.

## 2023-08-22 ENCOUNTER — TELEPHONE (OUTPATIENT)
Dept: FAMILY MEDICINE CLINIC | Facility: CLINIC | Age: 73
End: 2023-08-22

## 2023-08-22 NOTE — TELEPHONE ENCOUNTER
LMTRC      In the office note lópez stated- Follow-up with any worsening symptoms or no improvement  Alternate ice and heat    Ok for hub to read .

## 2023-08-22 NOTE — TELEPHONE ENCOUNTER
Caller: Orly Rivas    Relationship: Self    Best call back number: [phone]     What is the best time to reach you: 08/23/23    Who are you requesting to speak with (clinical staff, provider,  specific staff member): CLINICAL    What was the call regarding: PATIENT HAS FINISHED THE CREAM FROM [facility] AND HER CONDITION HAS IMPROVED A LITTLE.    SHE IS UNSURE IF SHE NEEDS A REFILL FOR THIS MEDICATION OR IF ANOTHER APPOINTMENT IS REQUIRED.    NO FURTHER DETAILS PROVIDED    Is it okay if the provider responds through MyChart: NO

## 2023-08-23 ENCOUNTER — TELEPHONE (OUTPATIENT)
Dept: FAMILY MEDICINE CLINIC | Facility: CLINIC | Age: 73
End: 2023-08-23
Payer: MEDICARE

## 2023-08-23 NOTE — TELEPHONE ENCOUNTER
HUB TO READ WAS READ TO THE PATIENT AND PATIENT NEEDS TO KNOW IF SHE NEEDS A REFILL ON THE CREAM DUE TO IT WAS NOT MENTIONED IN THE MESSAGE.  PLEASE CALL HER BACK.

## 2023-08-31 ENCOUNTER — OFFICE VISIT (OUTPATIENT)
Dept: FAMILY MEDICINE CLINIC | Facility: CLINIC | Age: 73
End: 2023-08-31
Payer: MEDICARE

## 2023-08-31 VITALS
TEMPERATURE: 97.6 F | DIASTOLIC BLOOD PRESSURE: 61 MMHG | HEIGHT: 62 IN | WEIGHT: 105 LBS | SYSTOLIC BLOOD PRESSURE: 116 MMHG | BODY MASS INDEX: 19.32 KG/M2 | HEART RATE: 68 BPM | RESPIRATION RATE: 16 BRPM | OXYGEN SATURATION: 100 %

## 2023-08-31 DIAGNOSIS — M79.631 PAIN IN BOTH FOREARMS: ICD-10-CM

## 2023-08-31 DIAGNOSIS — M79.632 PAIN IN BOTH FOREARMS: ICD-10-CM

## 2023-08-31 DIAGNOSIS — M81.0 OSTEOPOROSIS, POST-MENOPAUSAL: ICD-10-CM

## 2023-08-31 DIAGNOSIS — G43.009 ATYPICAL MIGRAINE: ICD-10-CM

## 2023-08-31 DIAGNOSIS — M25.512 ACUTE PAIN OF BOTH SHOULDERS: Primary | ICD-10-CM

## 2023-08-31 DIAGNOSIS — M25.511 ACUTE PAIN OF BOTH SHOULDERS: Primary | ICD-10-CM

## 2023-08-31 PROCEDURE — 99214 OFFICE O/P EST MOD 30 MIN: CPT | Performed by: NURSE PRACTITIONER

## 2023-08-31 RX ORDER — CYCLOBENZAPRINE HCL 5 MG
TABLET ORAL
Qty: 30 TABLET | Refills: 1 | Status: SHIPPED | OUTPATIENT
Start: 2023-08-31

## 2023-08-31 NOTE — PROGRESS NOTES
"Chief Complaint  Discomfort in arms    Subjective          Orly presents to [facility]   as a 72-year-old female for follow-up on some muscle cramps and discomfort in her biceps shoulders  She was seen in the office last month.  This has been ongoing for several months.  She describes the pain as aching and cramping in her shoulders and forearms/biceps.  She states that she does sleep on her side and thought the pain was related to this.  It is worse with any heavy lifting or repetitive movements or when she crosses her arms in front of her.  She does lift her grandchild and stays very active exercising and working in the house.  She has tried over-the-counter medication.  She is also taking over-the-counter supplements for bone and muscle health and magnesium.  She has not had any change in signs and symptoms.  She did try a topical cream from alternative [facility] on last visit that did help slightly    She has no other increase or changes in symptoms    She has no other acute complaints of today    The following portions of the patient's history were reviewed and updated as appropriate: allergies, current medications, past family history, past medical history, past social history, past surgical history, and problem list      Review of Systems   Constitutional:  Negative for chills, fatigue and fever.   Eyes:  Negative for visual disturbance.   Respiratory:  Negative for cough, shortness of breath and wheezing.    Cardiovascular:  Negative for chest pain, palpitations and leg swelling.   Gastrointestinal:  Negative for abdominal pain, constipation, diarrhea and nausea.   Musculoskeletal:  Positive for arthralgias and myalgias.   Neurological:  Negative for dizziness and light-headedness.      Objective   Vital Signs:   Vitals:    08/31/23 1442   BP: 116/61   Pulse: 68   Resp: 16   Temp: 97.6 øF (36.4 øC)   SpO2: 100%   Weight: 47.6 kg (105 lb)   Height: 157 cm (61.81\")        BMI is " within normal parameters. No other follow-up for BMI required.        Physical Exam  Vitals reviewed.   Constitutional:       General: She is not in acute distress.  Eyes:      Conjunctiva/sclera: Conjunctivae normal.   Neck:      Thyroid: No thyromegaly.      Vascular: No carotid bruit.   Cardiovascular:      Rate and Rhythm: Normal rate and regular rhythm.      Heart sounds: Normal heart sounds.   Pulmonary:      Effort: Pulmonary effort is normal. No respiratory distress.      Breath sounds: Normal breath sounds. No stridor. No wheezing, rhonchi or rales.   Chest:      Chest wall: No tenderness.   Musculoskeletal:      Right upper arm: Normal. No swelling, edema, deformity, lacerations, tenderness or bony tenderness.      Left upper arm: Normal. No swelling, edema, deformity, lacerations, tenderness or bony tenderness.        Arms:    Neurological:      Mental Status: She is alert.   Psychiatric:         Attention and Perception: Attention normal.         Mood and Affect: Mood normal.        Result Review :     The following data was reviewed by: CHANTEL Peñaloza on 08/31/2023:  Vitamin D,25-Hydroxy (05/30/2023 10:58)  T4, Free (05/30/2023 10:58)  TSH (05/30/2023 10:58)  Lipid Panel (05/30/2023 10:58)  CBC & Differential (05/30/2023 10:58)  Comprehensive Metabolic Panel (05/30/2023 10:58)    I have reviewed all lab results which are normal or stable. Please inform the patient      Assessment and Plan    Diagnoses and all orders for this visit:    1. Acute pain of both shoulders (Primary)  Comments:  Continue topical Rx  Trial Flexeril  Avoid alcohol or Tylenol while taking muscle relaxer  We will refer to PT if no improvement  Orders:  -     cyclobenzaprine (FLEXERIL) 5 MG tablet; 1-2 tabs PO TID PRN spasms  Dispense: 30 tablet; Refill: 1    2. Pain in both forearms  Comments:  Continue topical Rx  Trial Flexeril  Avoid alcohol or Tylenol while taking muscle relaxer  We will refer to PT if no  improvement  Orders:  -     cyclobenzaprine (FLEXERIL) 5 MG tablet; 1-2 tabs PO TID PRN spasms  Dispense: 30 tablet; Refill: 1    3. Osteoporosis, post-menopausal  Comments:  Needs DEXA scan plans to schedule    4. Atypical migraine  Comments:  Report any increase or changes continue current management    Take all medications as prescribed.   The patient was instructed to not drink alcohol and to not drive while taking this medication.  -Increase daily water intake  -Return to the office for worsening signs or symptoms, fever, chills, pain going down your legs, or if symptoms do not improve after 2 weeks.    -Seek immediate medical attention for loss of bladder or bowel function, weakness or numbness in arms or legs, nausea, vomiting, abdominal pain, or syncope.  -The patient verbalized understanding of all instructions given today.         Follow Up   Return in about 3 months (around 11/30/2023), or if symptoms worsen or fail to improve.  Patient was given instructions and counseling regarding her condition or for health maintenance advice. Please see specific information pulled into the AVS if appropriate.

## 2023-10-03 ENCOUNTER — TELEPHONE (OUTPATIENT)
Dept: FAMILY MEDICINE CLINIC | Facility: CLINIC | Age: 73
End: 2023-10-03

## 2023-10-03 NOTE — TELEPHONE ENCOUNTER
Caller: Orly Rivas    Relationship: Self    Best call back number:     [phone] (Home)       What is the best time to reach you: ANY     Who are you requesting to speak with (clinical staff, provider,  specific staff member): CLINICAL     What was the call regarding: MUSCLE RELAXER'S NOT HELPING AND NEED PHYSICAL THERAPY     Is it okay if the provider responds through MyChart: CALL

## 2023-10-04 DIAGNOSIS — M25.512 ACUTE PAIN OF BOTH SHOULDERS: Primary | ICD-10-CM

## 2023-10-04 DIAGNOSIS — M79.631 PAIN IN BOTH FOREARMS: ICD-10-CM

## 2023-10-04 DIAGNOSIS — M25.511 ACUTE PAIN OF BOTH SHOULDERS: Primary | ICD-10-CM

## 2023-10-04 DIAGNOSIS — M79.632 PAIN IN BOTH FOREARMS: ICD-10-CM

## 2023-10-04 NOTE — TELEPHONE ENCOUNTER
Order for physical therapy placed in this building.  If she would like another office I will send or she can  referral to take anywhere she likes  Evelin

## 2023-10-12 ENCOUNTER — TREATMENT (OUTPATIENT)
Dept: PHYSICAL THERAPY | Facility: CLINIC | Age: 73
End: 2023-10-12
Payer: MEDICARE

## 2023-10-12 DIAGNOSIS — G89.29 CHRONIC RIGHT SHOULDER PAIN: Primary | ICD-10-CM

## 2023-10-12 DIAGNOSIS — M25.511 CHRONIC RIGHT SHOULDER PAIN: Primary | ICD-10-CM

## 2023-10-12 DIAGNOSIS — R29.898 WEAKNESS OF BOTH UPPER EXTREMITIES: ICD-10-CM

## 2023-10-12 DIAGNOSIS — M25.512 CHRONIC LEFT SHOULDER PAIN: ICD-10-CM

## 2023-10-12 DIAGNOSIS — R29.3 ABNORMAL POSTURE: ICD-10-CM

## 2023-10-12 DIAGNOSIS — G89.29 CHRONIC LEFT SHOULDER PAIN: ICD-10-CM

## 2023-10-12 NOTE — PROGRESS NOTES
Physical Therapy Initial Evaluation and Plan of Care  [facility] [address], [address]  [place] [address]     Patient: Orly Rivas   : 1950  Referring practitioner: Evelin Trevino APRN  Date of Initial Visit: 10/12/2023  Today's Date: 10/13/2023  Patient seen for 1 sessions  PT Clinic location: [address], [address]  [address]          Visit Diagnoses:    ICD-10-CM ICD-9-CM   1. Chronic right shoulder pain  M25.511 719.41    G89.29 338.29   2. Chronic left shoulder pain  M25.512 719.41    G89.29 338.29   3. Weakness of both upper extremities  R29.898 729.89   4. Abnormal posture  R29.3 781.92       Subjective Questionnaire: QuickDASH: 61.36%    Subjective Evaluation    History of Present Illness  Mechanism of injury: Pt reports ~3-4 month history of left shoulder pain. After vacation she reports starting to feel right shoulder pain, with gradual increase in symptoms down into upper arm area. She saw PCP and was given creams and muscle relaxants however hasn't had change in pain.    She notes sleeping on her left side with increase in pain during sleep. She also reports reaching overhead, behind back, or laterally, as the most provocative activities. She reports her right arm is worse than her left lately.   When her shoulders are at their worst she notes lower cervical area pain. She reports history of migraines which have increased recently.    She reports being fairly active and taking care of a 3 year old granddaughter. Denies strengthening exercise, but walks regularly.    Pain  At best pain ratin  At worst pain ratin    Hand dominance: right    Patient Goals  Patient goals for therapy: independence with ADLs/IADLs, decreased pain and increased motion      Medical history: **osteoporosis**, migraines. See chart for further detail.   Therapy Precautions: N/A        Objective          Static  Posture     Head  Forward.    Shoulders  Rounded.    Scapulae  Left anteriorly tipped, right anteriorly tipped, left protracted and right protracted.    Thoracic Spine  Hyperkyphosis.    Palpation   Left   No palpable tenderness to the pectoralis major, pectoralis minor, supraspinatus and upper trapezius.   Hypertonic in the infraspinatus.   Tenderness of the cervical interspinals, infraspinatus and suboccipitals.     Right   No palpable tenderness to the pectoralis major, pectoralis minor and upper trapezius.   Hypertonic in the infraspinatus. Tenderness of the cervical interspinals, infraspinatus, suboccipitals and supraspinatus.     Active Range of Motion   Left Shoulder   Flexion: 138 (slight p! lateral L upper arm) degrees   Abduction: 140 degrees   External rotation BTH: T3   Internal rotation BTB: L3     Right Shoulder   Flexion: 130 (p! R lateral upper arm) degrees with pain  Abduction: 118 (p! R lateral upper arm) degrees with pain  External rotation BTH: T2 with pain  Internal rotation BTB: sacrum with pain    Additional Active Range of Motion Details  Decreased lower cervical extension  Decreased upper cervical flexion    L % decreased lower cervical motion  R SB 50% decreased motion throughout cervical spine  L rot 90% with protraction  R rot 90% with protraction    Strength/Myotome Testing     Left Shoulder     Planes of Motion   Flexion: 4   Abduction: 4+   External rotation at 0ø: 5   Internal rotation at 0ø: 5     Isolated Muscles   Rhomboids: 4-     Right Shoulder     Planes of Motion   Flexion: 3+ (p! R)   Abduction: 3+ (p! R)   External rotation at 0ø: 4+   Internal rotation at 0ø: 4     Isolated Muscles   Rhomboids: 4-     Tests   Cervical     Left   Negative Spurling's sign, ULTT1 and ULTT4.     Right   Positive ULTT1 and ULTT4.   Negative Spurling's sign.     Left Shoulder   Positive Hawkin's.   Negative belly press, drop arm, empty can (mild soreness) and external rotation lag sign.      Right Shoulder   Positive belly press and Hawkin's.   Negative drop arm, empty can (mild soreness), external rotation lag sign and Speed's.             Assessment & Plan       Assessment  Impairments: abnormal or restricted ROM, impaired physical strength, lacks appropriate home exercise program and pain with function   Functional limitations: carrying objects, lifting, sleeping, uncomfortable because of pain, reaching behind back, reaching overhead and unable to perform repetitive tasks   Assessment details: The patient is a 72 y.o. female who presents to physical therapy today for complaints of right more than left sided shoulder pain. Upon initial evaluation, the patient demonstrates the following impairments: decreased B UE strength, decreased B shoulder ROM, positive special test findings, tenderness on palpation to RC and parascapular muscles, and postural dysfunction. 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 Due to these impairments, the patient is unable to perform or has difficulty with the following functional tasks: reaching, lifting, carrying, sleeping. The patient would benefit from skilled PT services to address functional limitations and impairments and to improve patient quality of life.      Prognosis: good    Goals  Plan Goals: ST. Pt will be independent and compliant with initial HEP in 2 weeks.  2. Pt will report a 50% improvement in symptoms since starting therapy in 4 weeks.  3. Pt will report pain level at worst <4 during reaching activity in 4 weeks.  4. Pt will demonstrate an increase in R shoulder flexion ROM to 135 degrees pain free within 4 weeks.     LTG: - by DC (12 weeks)  1. Pt will be independent with final HEP for self-management of condition by DC.  2. Pt will improve score on QuickDASH to less than 25% impairment by DC.   3. Pt will report a 80% improvement in symptoms by DC in order to allow return to PLOF.  4. Pt will improve R shoulder abduction  and flexion to at least 140 deg in order to be able to reach into cabinet to get cup or plate by DC.  5. Pt will improve B shoulder flexion strength to at least 4+/5 in order to be able to lift granddaughter by DC.       Plan  Therapy options: will be seen for skilled therapy services  Planned modality interventions: cryotherapy, electrical stimulation/Russian stimulation, TENS, thermotherapy (hydrocollator packs) and ultrasound  Planned therapy interventions: body mechanics training, ADL retraining, flexibility, functional ROM exercises, home exercise program, joint mobilization, manual therapy, motor coordination training, neuromuscular re-education, postural training, soft tissue mobilization, spinal/joint mobilization, strengthening, stretching and therapeutic activities  Frequency: 2x week  Duration in weeks: 12  Treatment plan discussed with: patient        See flowsheets for treatment detail.  Education: POC, HEP, pathoanatomy, rationale    History # of Personal Factors and/or Comorbidities: MODERATE (1-2)  Examination of Body System(s): # of elements: LOW (1-2)  Clinical Presentation: STABLE   Clinical Decision Making: LOW       Timed:         Manual Therapy:         mins  71892;     Therapeutic Exercise:    5     mins  25526;     Neuromuscular Dayo:    15    mins  45457;    Therapeutic Activity:     5     mins  92827;     Gait Training:           mins  39607;     Ultrasound:          mins  16337;    Ionto                                   mins   41202  Self Care                       15     mins   29530      Un-Timed:  Electrical Stimulation:         mins  04703 ( );  Dry Needling          mins self-pay  Traction          mins 32652  Low Eval     20     Mins  37331  Mod Eval          Mins  76384  High Eval                            Mins  41062  Re-Eval                               mins  34672      Timed Treatment:   40   mins   Total Treatment:     60   mins    PT SIGNATURE: Conor Sierra  PT   [place] PT license #: [id number]  DATE TREATMENT INITIATED: 10/13/2023    Initial Certification  Certification Period: 1/10/2024  I certify that the therapy services are furnished while this patient is under my care.  The services outlined above are required by this patient, and will be reviewed every 90 days.    PHYSICIAN: Evelin Trevino APRN  NPI: [id number]                                      DATE:     Please sign and return via fax to [facility] - Fax #: [phone]. Thank you, [facility].

## 2023-10-16 ENCOUNTER — TREATMENT (OUTPATIENT)
Dept: PHYSICAL THERAPY | Facility: CLINIC | Age: 73
End: 2023-10-16
Payer: MEDICARE

## 2023-10-16 DIAGNOSIS — G89.29 CHRONIC RIGHT SHOULDER PAIN: Primary | ICD-10-CM

## 2023-10-16 DIAGNOSIS — M79.632 PAIN IN BOTH FOREARMS: ICD-10-CM

## 2023-10-16 DIAGNOSIS — M79.631 PAIN IN BOTH FOREARMS: ICD-10-CM

## 2023-10-16 DIAGNOSIS — M25.512 ACUTE PAIN OF BOTH SHOULDERS: ICD-10-CM

## 2023-10-16 DIAGNOSIS — M25.512 CHRONIC LEFT SHOULDER PAIN: ICD-10-CM

## 2023-10-16 DIAGNOSIS — R29.3 ABNORMAL POSTURE: ICD-10-CM

## 2023-10-16 DIAGNOSIS — M25.511 ACUTE PAIN OF BOTH SHOULDERS: ICD-10-CM

## 2023-10-16 DIAGNOSIS — M25.511 CHRONIC RIGHT SHOULDER PAIN: Primary | ICD-10-CM

## 2023-10-16 DIAGNOSIS — R29.898 WEAKNESS OF BOTH UPPER EXTREMITIES: ICD-10-CM

## 2023-10-16 DIAGNOSIS — G89.29 CHRONIC LEFT SHOULDER PAIN: ICD-10-CM

## 2023-10-16 RX ORDER — CYCLOBENZAPRINE HCL 5 MG
TABLET ORAL
Qty: 30 TABLET | Refills: 1 | Status: SHIPPED | OUTPATIENT
Start: 2023-10-16

## 2023-10-16 NOTE — PROGRESS NOTES
Physical Therapy Daily Treatment Note    [facility] [place]  [address], [address]  [place] [address]    Visit # 2        Patient: Orly Rivas   : 1950  Referring practitioner: CHANTEL Peñaloza  Date of Initial Evaluation:  Type: THERAPY  Noted: 10/12/2023  Today's Date: 10/16/2023           ICD-10-CM ICD-9-CM   1. Chronic right shoulder pain  M25.511 719.41    G89.29 338.29   2. Chronic left shoulder pain  M25.512 719.41    G89.29 338.29   3. Weakness of both upper extremities  R29.898 729.89   4. Abnormal posture  R29.3 781.92       Subjective  Orly Rivas reports:   I think starting those exercises has helped a little.     Objective   See Exercise, Manual, and Modality Logs for complete treatment - any intervention not performed this date has been marked as deferred.     Pt Education:  HEP review  Exercise rationale/ pain free exercise performance  Anatomy and structure of affected musculature  Posture/Postural awareness  Alternate exercise positions  Verbal/Tactile cues to ensure correct exercise performance/technique    Reviewed current HEP, progressed therex program with exercises as noted.  Added sidelying scaption AROM, supine scap depress to HEP, written instructions issued.      Assessment/Plan  Tolerated continued progression of therapeutic exercise/therapeutic activity well today, no increased pain reported during or after exercises.  Would continue to benefit from skilled PT progressing with functional ROM and strength of shoulder.        Progress per Plan of Care and Progress strengthening /stabilization /functional activity           Timed:         Manual Therapy:         mins  67340     Therapeutic Exercise:     22    mins  81101     Neuromuscular Dayo:    8    mins  31604    Therapeutic Activity:          mins  28010     Gait Training:           mins  50336     Ultrasound:          mins  71018    Ladi                                    mins  92035  Self Care                            mins  39938    Un-Timed:  Electrical Stimulation:         mins 55886 ( )  Traction          mins 96968    Timed Treatment:   30   mins   Total Treatment:     30   mins    THELMA Colvin License #[id number]  Physical Therapist Assistant

## 2023-10-16 NOTE — TELEPHONE ENCOUNTER
Caller: Orly Rivas    Relationship: Self    Best call back number: [phone]     Requested Prescriptions:   Requested Prescriptions     Pending Prescriptions Disp Refills    cyclobenzaprine (FLEXERIL) 5 MG tablet 30 tablet 1     Si-2 tabs PO TID PRN spasms      Pharmacy where request should be sent: [facility]/PHARMACY #[id number] - [place], [place] - [address] - [phone]  - [phone] FX     Last office visit with prescribing clinician: 2023   Last telemedicine visit with prescribing clinician: Visit date not found   Next office visit with prescribing clinician: Visit date not found     Additional details provided by patient: PT HAS 2 DAYS LEFT OF MEDICATION.     Does the patient have less than a 3 day supply:  [x] Yes  [] No    Would you like a call back once the refill request has been completed: [] Yes [x] No    If the office needs to give you a call back, can they leave a voicemail: [] Yes [] No    Evette Adorno Rep   10/16/23 11:47 EDT

## 2023-10-19 ENCOUNTER — TREATMENT (OUTPATIENT)
Dept: PHYSICAL THERAPY | Facility: CLINIC | Age: 73
End: 2023-10-19
Payer: MEDICARE

## 2023-10-19 DIAGNOSIS — R29.898 WEAKNESS OF BOTH UPPER EXTREMITIES: ICD-10-CM

## 2023-10-19 DIAGNOSIS — M25.511 CHRONIC RIGHT SHOULDER PAIN: Primary | ICD-10-CM

## 2023-10-19 DIAGNOSIS — G89.29 CHRONIC RIGHT SHOULDER PAIN: Primary | ICD-10-CM

## 2023-10-19 DIAGNOSIS — G89.29 CHRONIC LEFT SHOULDER PAIN: ICD-10-CM

## 2023-10-19 DIAGNOSIS — M25.512 CHRONIC LEFT SHOULDER PAIN: ICD-10-CM

## 2023-10-19 DIAGNOSIS — R29.3 ABNORMAL POSTURE: ICD-10-CM

## 2023-10-23 NOTE — PROGRESS NOTES
Physical Therapy Daily Treatment Note    [facility] [place]  [address], [address]  [place] [address]    Visit # 3        Patient: Orly Rivas   : 1950  Referring practitioner: CHANTEL Peñaloza  Date of Initial Evaluation:  Type: THERAPY  Noted: 10/12/2023  Today's Date: 10/22/2023           ICD-10-CM ICD-9-CM   1. Chronic right shoulder pain  M25.511 719.41    G89.29 338.29   2. Chronic left shoulder pain  M25.512 719.41    G89.29 338.29   3. Weakness of both upper extremities  R29.898 729.89   4. Abnormal posture  R29.3 781.92       Subjective  Orly Rivas reports:   I was sore, I could tell we had been working!    Objective   See Exercise, Manual, and Modality Logs for complete treatment - any intervention not performed this date has been marked as deferred.     Pt Education:  HEP review  Exercise rationale/ pain free exercise performance  Anatomy and structure of affected musculature  Posture/Postural awareness  Alternate exercise positions  Verbal/Tactile cues to ensure correct exercise performance/technique    Assessment/Plan  Tolerated continued progression of therapeutic exercise/therapeutic activity well today, slow progression of exercise d/t c/o soreness lingering from last PT session.  Able to add hitchhiker's vs t-band with no significant discomfort.    Would continue to benefit from skilled PT progressing with functional ROM and strength of shoulder.        Progress per Plan of Care and Progress strengthening /stabilization /functional activity       Timed:         Manual Therapy:         mins  44024     Therapeutic Exercise:     24    mins  30513     Neuromuscular Dayo:    9    mins  56041    Therapeutic Activity:          mins  26594     Gait Training:           mins  92165     Ultrasound:          mins  63369    Ionto                                   mins  41441  Self Care                            mins   37474    Un-Timed:  Electrical Stimulation:         mins 45921 ( )  Traction          mins 80942    Timed Treatment:   33   mins   Total Treatment:     33   mins    THELMA Colvin License #[id number]  Physical Therapist Assistant

## 2023-10-24 ENCOUNTER — TREATMENT (OUTPATIENT)
Dept: PHYSICAL THERAPY | Facility: CLINIC | Age: 73
End: 2023-10-24
Payer: MEDICARE

## 2023-10-24 DIAGNOSIS — R29.3 ABNORMAL POSTURE: ICD-10-CM

## 2023-10-24 DIAGNOSIS — M25.511 CHRONIC RIGHT SHOULDER PAIN: Primary | ICD-10-CM

## 2023-10-24 DIAGNOSIS — M25.512 CHRONIC LEFT SHOULDER PAIN: ICD-10-CM

## 2023-10-24 DIAGNOSIS — G89.29 CHRONIC LEFT SHOULDER PAIN: ICD-10-CM

## 2023-10-24 DIAGNOSIS — G89.29 CHRONIC RIGHT SHOULDER PAIN: Primary | ICD-10-CM

## 2023-10-24 DIAGNOSIS — R29.898 WEAKNESS OF BOTH UPPER EXTREMITIES: ICD-10-CM

## 2023-10-24 PROCEDURE — 97112 NEUROMUSCULAR REEDUCATION: CPT | Performed by: PHYSICAL THERAPIST

## 2023-10-24 PROCEDURE — 97110 THERAPEUTIC EXERCISES: CPT | Performed by: PHYSICAL THERAPIST

## 2023-10-24 NOTE — PROGRESS NOTES
Physical Therapy Daily Treatment Note    [facility] [place]  [address], [address]  [place] [address]    Visit # 4        Patient: Orly Rivas   : 1950  Referring practitioner: CHANTEL Peñaloza  Date of Initial Evaluation:  Type: THERAPY  Noted: 10/12/2023  Today's Date: 10/24/2023           ICD-10-CM ICD-9-CM   1. Chronic right shoulder pain  M25.511 719.41    G89.29 338.29   2. Chronic left shoulder pain  M25.512 719.41    G89.29 338.29   3. Weakness of both upper extremities  R29.898 729.89   4. Abnormal posture  R29.3 781.92       Subjective  Orly Rivas reports:   My (R) shoulder is aching more than the (L) today, that's pretty typical for me.     Objective   See Exercise, Manual, and Modality Logs for complete treatment - any intervention not performed this date has been marked as deferred.     Pt Education:  HEP review  Exercise rationale/ pain free exercise performance  Anatomy and structure of affected musculature  Posture/Postural awareness  Alternate exercise positions  Verbal/Tactile cues to ensure correct exercise performance/technique    Assessment & Plan       Assessment  Assessment details: Tolerated continued progression of therapeutic exercise/therapeutic activity well today, improved tolerance to program progression today. Would continue to benefit from skilled PT progressing with functional ROM and strength of shoulder.    Reviewed goals today, STG #1 met thus far.     Goals  Plan Goals: ST. Pt will be independent and compliant with initial HEP in 2 weeks - MET  2. Pt will report a 50% improvement in symptoms since starting therapy in 4 weeks.  3. Pt will report pain level at worst <4 during reaching activity in 4 weeks.  4. Pt will demonstrate an increase in R shoulder flexion ROM to 135 degrees pain free within 4 weeks.      LTG: - by DC (12 weeks)  1. Pt will be independent with final HEP for self-management of condition  by DC.  2. Pt will improve score on QuickDASH to less than 25% impairment by DC.   3. Pt will report a 80% improvement in symptoms by DC in order to allow return to PLOF.  4. Pt will improve R shoulder abduction and flexion to at least 140 deg in order to be able to reach into cabinet to get cup or plate by DC.  5. Pt will improve B shoulder flexion strength to at least 4+/5 in order to be able to lift granddaughter by DC.            Progress per Plan of Care and Progress strengthening /stabilization /functional activity continue to review and progress HEP next visit.        Timed:         Manual Therapy:         mins  30913     Therapeutic Exercise:     24    mins  00995     Neuromuscular Dayo:    8    mins  11838    Therapeutic Activity:          mins  33482     Gait Training:           mins  99670     Ultrasound:          mins  02494    Ionto                                   mins  62134  Self Care                            mins  68568    Un-Timed:  Electrical Stimulation:         mins 14628 ( )  Traction          mins 20927    Timed Treatment:   32   mins   Total Treatment:     32   mins    THELMA Colvin License #[id number]  Physical Therapist Assistant

## 2023-10-27 ENCOUNTER — TREATMENT (OUTPATIENT)
Dept: PHYSICAL THERAPY | Facility: CLINIC | Age: 73
End: 2023-10-27
Payer: MEDICARE

## 2023-10-27 ENCOUNTER — OFFICE VISIT (OUTPATIENT)
Dept: FAMILY MEDICINE CLINIC | Facility: CLINIC | Age: 73
End: 2023-10-27
Payer: MEDICARE

## 2023-10-27 VITALS
SYSTOLIC BLOOD PRESSURE: 118 MMHG | HEIGHT: 62 IN | HEART RATE: 81 BPM | DIASTOLIC BLOOD PRESSURE: 78 MMHG | BODY MASS INDEX: 19.4 KG/M2 | WEIGHT: 105.4 LBS | OXYGEN SATURATION: 98 %

## 2023-10-27 DIAGNOSIS — G43.009 ATYPICAL MIGRAINE: Primary | ICD-10-CM

## 2023-10-27 DIAGNOSIS — M25.511 CHRONIC RIGHT SHOULDER PAIN: Primary | ICD-10-CM

## 2023-10-27 DIAGNOSIS — R29.3 ABNORMAL POSTURE: ICD-10-CM

## 2023-10-27 DIAGNOSIS — M79.632 PAIN IN BOTH FOREARMS: ICD-10-CM

## 2023-10-27 DIAGNOSIS — R29.898 WEAKNESS OF BOTH UPPER EXTREMITIES: ICD-10-CM

## 2023-10-27 DIAGNOSIS — G89.29 CHRONIC LEFT SHOULDER PAIN: ICD-10-CM

## 2023-10-27 DIAGNOSIS — G89.29 CHRONIC RIGHT SHOULDER PAIN: Primary | ICD-10-CM

## 2023-10-27 DIAGNOSIS — M81.0 OSTEOPOROSIS, POST-MENOPAUSAL: ICD-10-CM

## 2023-10-27 DIAGNOSIS — M79.631 PAIN IN BOTH FOREARMS: ICD-10-CM

## 2023-10-27 DIAGNOSIS — M25.512 CHRONIC LEFT SHOULDER PAIN: ICD-10-CM

## 2023-10-27 PROCEDURE — 97110 THERAPEUTIC EXERCISES: CPT | Performed by: PHYSICAL THERAPIST

## 2023-10-27 PROCEDURE — 97112 NEUROMUSCULAR REEDUCATION: CPT | Performed by: PHYSICAL THERAPIST

## 2023-10-27 PROCEDURE — 99214 OFFICE O/P EST MOD 30 MIN: CPT | Performed by: NURSE PRACTITIONER

## 2023-10-27 RX ORDER — RIZATRIPTAN BENZOATE 10 MG/1
10 TABLET, ORALLY DISINTEGRATING ORAL ONCE AS NEEDED
Qty: 14 TABLET | Refills: 5 | Status: SHIPPED | OUTPATIENT
Start: 2023-10-27

## 2023-10-27 RX ORDER — BUTALBITAL, ACETAMINOPHEN, CAFFEINE AND CODEINE PHOSPHATE 50; 325; 40; 30 MG/1; MG/1; MG/1; MG/1
1 CAPSULE ORAL EVERY 4 HOURS PRN
Qty: 60 CAPSULE | Refills: 0 | Status: SHIPPED | OUTPATIENT
Start: 2023-10-27 | End: 2023-11-26

## 2023-10-27 NOTE — PROGRESS NOTES
Physical Therapy Daily Treatment Note    [facility] [place]  [address], [address]  [place] [address]    Visit # 5        Patient: Orly Rivas   : 1950  Referring practitioner: CHANTEL Peñaloza  Date of Initial Evaluation:  Type: THERAPY  Noted: 10/12/2023  Today's Date: 10/30/2023           ICD-10-CM ICD-9-CM   1. Chronic right shoulder pain  M25.511 719.41    G89.29 338.29   2. Chronic left shoulder pain  M25.512 719.41    G89.29 338.29   3. Weakness of both upper extremities  R29.898 729.89   4. Abnormal posture  R29.3 781.92         Subjective  Orly Rivas reports:   I feel tight in the (R) side of my neck today, down into the top of my (R) shoulder.      Objective   See Exercise, Manual, and Modality Logs for complete treatment - any intervention not performed this date has been marked as deferred.     Pt Education:  HEP review  Exercise rationale/ pain free exercise performance  Anatomy and structure of affected musculature  Posture/Postural awareness  Alternate exercise positions  Verbal/Tactile cues to ensure correct exercise performance/technique    Assessment & Plan       Assessment  Assessment details: Tolerated continued progression of therapeutic exercise/therapeutic activity well today, improved tolerance to program progression today. We have been able to progress with t-band resisted exercise over the past two visits, with plan to progress HEP at next visit.   Would continue to benefit from skilled PT progressing with functional ROM and strength of shoulder.    Reviewed goals today, STG #1 met thus far.     Goals  Plan Goals: ST. Pt will be independent and compliant with initial HEP in 2 weeks - MET  2. Pt will report a 50% improvement in symptoms since starting therapy in 4 weeks.  3. Pt will report pain level at worst <4 during reaching activity in 4 weeks.  4. Pt will demonstrate an increase in R shoulder flexion ROM to 135  degrees pain free within 4 weeks.      LTG: - by DC (12 weeks)  1. Pt will be independent with final HEP for self-management of condition by DC.  2. Pt will improve score on QuickDASH to less than 25% impairment by DC.   3. Pt will report a 80% improvement in symptoms by DC in order to allow return to PLOF.  4. Pt will improve R shoulder abduction and flexion to at least 140 deg in order to be able to reach into cabinet to get cup or plate by DC.  5. Pt will improve B shoulder flexion strength to at least 4+/5 in order to be able to lift granddaughter by DC.        Progress per Plan of Care and Progress strengthening /stabilization /functional activity continue to review and progress HEP next visit.        Timed:         Manual Therapy:     5    mins  99955     Therapeutic Exercise:     19    mins  79128     Neuromuscular Dayo:    10    mins  54987    Therapeutic Activity:          mins  40161     Gait Training:           mins  86622     Ultrasound:          mins  88121    Ionto                                   mins  18879  Self Care                            mins  43264    Un-Timed:  Electrical Stimulation:         mins 55488 ( )  Traction          mins 46567    Timed Treatment:   34   mins   Total Treatment:     34   mins    Chance Henley PTA  [place] License #[id number]  Physical Therapist Assistant

## 2023-10-27 NOTE — PROGRESS NOTES
Chief Complaint  Migraine and Med Management    Subjective          Orly presents to [facility]   as a 72-year-old female  to follow-up for her migraines  She has a history of atypical migraines.  Onset was several years ago.  She is currently taking Fioricet.  She takes this medication as needed 1-3 times per week.  She does have migraines 2-3 times per week with no aura.    She denies any nausea and vomiting.  She has had slight increase in frequency with seasonal allergies no change in intensity since last visit.  She has decreased her dose within the last couple years.  She has been tried on several different medications that have helped with her symptoms.  She denies any medication side effects.  She does supplement with Maxalt/naproxen as needed.  No changes in symptoms since last visit     She did have recent labs 5/2023 with no major concerns    She has been complaining of some ongoing muscle pain and cramping in her shoulders and forearms.  She states that she does sleep on her side and thought that the pain was related to this.  It is worse with any heavy lifting or crossing her arms or after doing repetitive motions.  She has tried flexeril, physical therapy and alternative Rx pain cream.  They are all helping .   No known injury or swelling or bruising  She continues to  take a over-the-counter supplement for bone and muscle health magnesium  She just started taking this medicine and has not noticed a change in any signs or symptoms      She is up to date on her Dexa scan and would like to start back on prolia injections.  She has not been on them since Covid pandemic.  She is agreeable to reordering them today     She has no other acute complaints at today    The following portions of the patient's history were reviewed and updated as appropriate: allergies, current medications, past family history, past medical history, past social history, past surgical history, and  "problem list      Review of Systems   Constitutional:  Negative for chills, fatigue and fever.   Eyes:  Negative for visual disturbance.   Respiratory:  Negative for cough, shortness of breath and wheezing.    Cardiovascular:  Negative for chest pain, palpitations and leg swelling.   Gastrointestinal:  Negative for abdominal pain, diarrhea, nausea and vomiting.   Musculoskeletal:  Negative for arthralgias.   Neurological:  Negative for dizziness and light-headedness.   Psychiatric/Behavioral:  Negative for self-injury and suicidal ideas.         Objective   Vital Signs:   Vitals:    10/27/23 1013   BP: 118/78   Pulse: 81   SpO2: 98%   Weight: 47.8 kg (105 lb 6.4 oz)   Height: 157 cm (61.81\")        BMI is within normal parameters. No other follow-up for BMI required.        Physical Exam  Vitals reviewed.   Constitutional:       General: She is not in acute distress.  Eyes:      Conjunctiva/sclera: Conjunctivae normal.   Neck:      Thyroid: No thyromegaly.      Vascular: No carotid bruit.   Cardiovascular:      Rate and Rhythm: Normal rate and regular rhythm.      Heart sounds: Normal heart sounds.   Pulmonary:      Effort: Pulmonary effort is normal. No respiratory distress.      Breath sounds: Normal breath sounds. No stridor. No wheezing, rhonchi or rales.   Chest:      Chest wall: No tenderness.   Lymphadenopathy:      Cervical: No cervical adenopathy.   Neurological:      Mental Status: She is alert.   Psychiatric:         Attention and Perception: Attention normal.         Mood and Affect: Mood normal.          Result Review :     The following data was reviewed by: CHANTEL Peñaloza on 10/27/2023:  Vitamin D,25-Hydroxy (05/30/2023 10:58)  T4, Free (05/30/2023 10:58)  TSH (05/30/2023 10:58)  Lipid Panel (05/30/2023 10:58)  CBC & Differential (05/30/2023 10:58)  Comprehensive Metabolic Panel (05/30/2023 10:58)    I have reviewed all lab results which are normal or stable. Please inform the patient.    "   Assessment and Plan    Diagnoses and all orders for this visit:    1. Atypical migraine (Primary)  Comments:  Controlled continue current management  Report any increase or changes in symptoms  Orders:  -     butalbital-acetaminophen-caffeine-codeine (FIORICET WITH CODEINE) -16-30 MG per capsule; Take 1 capsule by mouth Every 4 (Four) Hours As Needed for Headache for up to 30 days.  Dispense: 60 capsule; Refill: 0  -     rizatriptan MLT (Maxalt-MLT) 10 MG disintegrating tablet; Place 1 tablet on the tongue 1 (One) Time As Needed for Migraine for up to 28 doses. May repeat in 2 hours if needed  Dispense: 14 tablet; Refill: 5    2. Osteoporosis, post-menopausal  Comments:  restart Prolia  Orders:  -     Ambulatory Referral to [facility] For Infusion Treatment    3. Pain in both forearms  Comments:  continue current managment   improvmed  continue PT as directed        Follow Up   Return in about 3 months (around 1/27/2024) for Next scheduled follow up medication managment.  Patient was given instructions and counseling regarding her condition or for health maintenance advice. Please see specific information pulled into the AVS if appropriate.

## 2023-10-30 ENCOUNTER — TREATMENT (OUTPATIENT)
Dept: PHYSICAL THERAPY | Facility: CLINIC | Age: 73
End: 2023-10-30
Payer: MEDICARE

## 2023-10-30 DIAGNOSIS — G89.29 CHRONIC LEFT SHOULDER PAIN: ICD-10-CM

## 2023-10-30 DIAGNOSIS — M25.511 CHRONIC RIGHT SHOULDER PAIN: Primary | ICD-10-CM

## 2023-10-30 DIAGNOSIS — M25.512 CHRONIC LEFT SHOULDER PAIN: ICD-10-CM

## 2023-10-30 DIAGNOSIS — G89.29 CHRONIC RIGHT SHOULDER PAIN: Primary | ICD-10-CM

## 2023-10-30 DIAGNOSIS — R29.898 WEAKNESS OF BOTH UPPER EXTREMITIES: ICD-10-CM

## 2023-10-30 DIAGNOSIS — R29.3 ABNORMAL POSTURE: ICD-10-CM

## 2023-10-30 PROCEDURE — 97110 THERAPEUTIC EXERCISES: CPT | Performed by: PHYSICAL THERAPIST

## 2023-10-30 PROCEDURE — 97530 THERAPEUTIC ACTIVITIES: CPT | Performed by: PHYSICAL THERAPIST

## 2023-10-30 PROCEDURE — 97112 NEUROMUSCULAR REEDUCATION: CPT | Performed by: PHYSICAL THERAPIST

## 2023-10-30 NOTE — PROGRESS NOTES
Physical Therapy Daily Treatment Note  [facility] [place]  [address], [address]  [place] [id number]     Patient: Orly Rivas  : 1950  Referring practitioner: Evelin Trevino APRN  Today's Date: 10/30/2023    VISIT#: 6    Visit Diagnoses:    ICD-10-CM ICD-9-CM   1. Chronic right shoulder pain  M25.511 719.41    G89.29 338.29   2. Chronic left shoulder pain  M25.512 719.41    G89.29 338.29   3. Weakness of both upper extremities  R29.898 729.89   4. Abnormal posture  R29.3 781.92       Subjective   Orly Rivas reports: that she is still having pain at night when laying on it and in the evening some after activity. She notes pain worse on the right.      Objective   TOP R infraspinatus    See Exercise, Manual, and Modality Logs for complete treatment.     Patient Education: HEP update/revision; self-STM with tennis ball      Assessment/Plan  Pt demonstrates reasonably good technique with all exercises requiring only cuing for avoidance of lat dorsi activity during scap squeezes today. Progressed exercises with addition of open books and further loading in cheerleaders/hitchhikers. Revised HEP.      Progress per Plan of Care          Timed:         Manual Therapy:         mins  37874;     Therapeutic Exercise:    15     mins  51529;     Neuromuscular Dayo:    10    mins  62606;    Therapeutic Activity:     20     mins  01712;     Gait Training:           mins  06347;     Ultrasound:          mins  21039;    Ionto:                                   mins  01179  Self Care:                            mins  33875    Un-Timed:  Electrical Stimulation:         mins  92392 ( );  Dry Needling          mins self-pay  Traction          mins 93913  Re-Eval                               mins  07666  Group Therapy           ___ mins 22089    Timed Treatment:   45   mins   Total Treatment:     45   mins    Conor Sierra, PT  Physical Therapist  [place] PT license  #: [id number]

## 2023-11-06 ENCOUNTER — TREATMENT (OUTPATIENT)
Dept: PHYSICAL THERAPY | Facility: CLINIC | Age: 73
End: 2023-11-06
Payer: MEDICARE

## 2023-11-06 DIAGNOSIS — R29.898 WEAKNESS OF BOTH UPPER EXTREMITIES: ICD-10-CM

## 2023-11-06 DIAGNOSIS — M25.511 CHRONIC RIGHT SHOULDER PAIN: Primary | ICD-10-CM

## 2023-11-06 DIAGNOSIS — M25.512 CHRONIC LEFT SHOULDER PAIN: ICD-10-CM

## 2023-11-06 DIAGNOSIS — G89.29 CHRONIC RIGHT SHOULDER PAIN: Primary | ICD-10-CM

## 2023-11-06 DIAGNOSIS — G89.29 CHRONIC LEFT SHOULDER PAIN: ICD-10-CM

## 2023-11-06 DIAGNOSIS — R29.3 ABNORMAL POSTURE: ICD-10-CM

## 2023-11-06 PROCEDURE — 97530 THERAPEUTIC ACTIVITIES: CPT | Performed by: PHYSICAL THERAPIST

## 2023-11-06 PROCEDURE — 97112 NEUROMUSCULAR REEDUCATION: CPT | Performed by: PHYSICAL THERAPIST

## 2023-11-06 NOTE — PROGRESS NOTES
Physical Therapy Daily Treatment Note  [facility] [place]  [address], [address]  [place] [address]     Patient: Orly Rivas  : 1950  Referring practitioner: Evelin Trevino APRN  Today's Date: 2023    VISIT#: 7    Visit Diagnoses:    ICD-10-CM ICD-9-CM   1. Chronic right shoulder pain  M25.511 719.41    G89.29 338.29   2. Chronic left shoulder pain  M25.512 719.41    G89.29 338.29   3. Weakness of both upper extremities  R29.898 729.89   4. Abnormal posture  R29.3 781.92       Subjective   Orly Rivas reports: that she is sore after last weekend when she did a lot of lifting, reaching, carrying type activities. She reports that she's able to do more but she still gets aggravated in the neck and shoulders.      Objective       See Exercise, Manual, and Modality Logs for complete treatment.     Patient Education: HEP exercise cuing/technique. Decreasing POC.      Assessment/Plan  Pt tolerated exercises well with only some minor cuing for technique required overall. Discussed current exercise plan, technique and compliance, and plan to decrease POC to 1x/wk. She notes some fatigue by the end of session but minimal discomfort during tasks. During resisted exercise at wall, she has difficulty with cervical retraction so plan to add neck mobility and postural exercises next session.      Progress per Plan of Care          Timed:         Manual Therapy:         mins  84003;     Therapeutic Exercise:    5     mins  29003;     Neuromuscular Dayo:    25    mins  55097;    Therapeutic Activity:     15     mins  19274;     Gait Training:           mins  76475;     Ultrasound:          mins  46139;    Ionto:                                   mins  94483  Self Care:                            mins  27729    Un-Timed:  Electrical Stimulation:         mins  70949 ( );  Dry Needling          mins self-pay  Traction          mins 20481  Re-Eval                                mins  26892  Group Therapy           ___ mins 14714    Timed Treatment:   45   mins   Total Treatment:     45   mins    Conor Sierra PT  Physical Therapist  [employer] license #: [id number]

## 2023-11-14 DIAGNOSIS — M81.0 OSTEOPOROSIS, POST-MENOPAUSAL: Primary | ICD-10-CM

## 2023-11-15 ENCOUNTER — TREATMENT (OUTPATIENT)
Dept: PHYSICAL THERAPY | Facility: CLINIC | Age: 73
End: 2023-11-15
Payer: MEDICARE

## 2023-11-15 DIAGNOSIS — M25.511 CHRONIC RIGHT SHOULDER PAIN: Primary | ICD-10-CM

## 2023-11-15 DIAGNOSIS — M25.512 CHRONIC LEFT SHOULDER PAIN: ICD-10-CM

## 2023-11-15 DIAGNOSIS — R29.898 WEAKNESS OF BOTH UPPER EXTREMITIES: ICD-10-CM

## 2023-11-15 DIAGNOSIS — G89.29 CHRONIC RIGHT SHOULDER PAIN: Primary | ICD-10-CM

## 2023-11-15 DIAGNOSIS — R29.3 ABNORMAL POSTURE: ICD-10-CM

## 2023-11-15 DIAGNOSIS — G89.29 CHRONIC LEFT SHOULDER PAIN: ICD-10-CM

## 2023-11-15 NOTE — PROGRESS NOTES
Physical Therapy Daily Treatment Note  [facility] [place]  [address], [address]  [place] [address]     Patient: Orly Rivas  : 1950  Referring practitioner: Evelin Trevino APRN  Today's Date: 11/15/2023    VISIT#: 8    Visit Diagnoses:    ICD-10-CM ICD-9-CM   1. Chronic right shoulder pain  M25.511 719.41    G89.29 338.29   2. Chronic left shoulder pain  M25.512 719.41    G89.29 338.29   3. Weakness of both upper extremities  R29.898 729.89   4. Abnormal posture  R29.3 781.92       Subjective   Orly Rivas reports: that she is more sore after a busy weekend moving her mother's belongings out of the house. She continued with some of the exercises but not full reps due to fatigue/soreness.      Objective       See Exercise, Manual, and Modality Logs for complete treatment.     Patient Education: HEP update      Assessment/Plan  Pt required some cuing for technique with sleeper stretch, but tolerated exercise well following cues for position and gentleness. Added new cervical ROM task with good tolerance today. She still has difficulty with scapular retraction when attempting to avoid lat dorsi activation. Plan graded progression of resistance as tolerated next session.      Progress per Plan of Care          Timed:         Manual Therapy:         mins  69077;     Therapeutic Exercise:    10     mins  87015;     Neuromuscular Dayo:    15    mins  24060;    Therapeutic Activity:     18     mins  77478;     Gait Training:           mins  75131;     Ultrasound:          mins  24592;    Ionto:                                   mins  45954  Self Care:                       2     mins  70147    Un-Timed:  Electrical Stimulation:         mins  97950 ( );  Dry Needling          mins self-pay  Traction          mins 64459  Re-Eval                               mins  36166  Group Therapy           ___ mins 75797    Timed Treatment:   45   mins   Total  Treatment:     45   mins    Conor Sierra PT  Physical Therapist  [employer] license #: [id number]

## 2023-11-20 DIAGNOSIS — M25.511 ACUTE PAIN OF BOTH SHOULDERS: ICD-10-CM

## 2023-11-20 DIAGNOSIS — M79.631 PAIN IN BOTH FOREARMS: ICD-10-CM

## 2023-11-20 DIAGNOSIS — M25.512 ACUTE PAIN OF BOTH SHOULDERS: ICD-10-CM

## 2023-11-20 DIAGNOSIS — M79.632 PAIN IN BOTH FOREARMS: ICD-10-CM

## 2023-11-20 NOTE — TELEPHONE ENCOUNTER
Caller: Olry Rivas    Relationship: Self    Best call back number: [phone]     Requested Prescriptions:   Requested Prescriptions     Pending Prescriptions Disp Refills    cyclobenzaprine (FLEXERIL) 5 MG tablet 30 tablet 1     Si-2 tabs PO TID PRN spasms        Pharmacy where request should be sent: [facility] [id number] [address] [phone] [facility] [phone] FX     Last office visit with prescribing clinician: 10/27/2023   Last telemedicine visit with prescribing clinician: Visit date not found   Next office visit with prescribing clinician: Visit date not found     Additional details provided by patient: PATIENT HAS ONE PILL LEFT    Does the patient have less than a 3 day supply:  [x] Yes  [] No    Would you like a call back once the refill request has been completed: [] Yes [x] No    If the office needs to give you a call back, can they leave a voicemail: [] Yes [x] No    Evette Warner Rep   23 12:35 EST

## 2023-11-21 ENCOUNTER — TREATMENT (OUTPATIENT)
Dept: PHYSICAL THERAPY | Facility: CLINIC | Age: 73
End: 2023-11-21
Payer: MEDICARE

## 2023-11-21 DIAGNOSIS — R29.898 WEAKNESS OF BOTH UPPER EXTREMITIES: ICD-10-CM

## 2023-11-21 DIAGNOSIS — M25.511 CHRONIC RIGHT SHOULDER PAIN: Primary | ICD-10-CM

## 2023-11-21 DIAGNOSIS — G89.29 CHRONIC RIGHT SHOULDER PAIN: Primary | ICD-10-CM

## 2023-11-21 DIAGNOSIS — R29.3 ABNORMAL POSTURE: ICD-10-CM

## 2023-11-21 DIAGNOSIS — M25.512 CHRONIC LEFT SHOULDER PAIN: ICD-10-CM

## 2023-11-21 DIAGNOSIS — G89.29 CHRONIC LEFT SHOULDER PAIN: ICD-10-CM

## 2023-11-21 RX ORDER — CYCLOBENZAPRINE HCL 5 MG
TABLET ORAL
Qty: 30 TABLET | Refills: 0 | Status: SHIPPED | OUTPATIENT
Start: 2023-11-21

## 2023-11-21 NOTE — LETTER
Physical Therapy Progress Note  [facility] [place]  [address], [address]  [place] [address]     Patient: Orly Rivas  : 1950  Referring practitioner: Evelin Trevino APRN  Today's Date: 2023    VISIT#: 9    Visit Diagnoses:    ICD-10-CM ICD-9-CM   1. Chronic right shoulder pain  M25.511 719.41    G89.29 338.29   2. Chronic left shoulder pain  M25.512 719.41    G89.29 338.29   3. Weakness of both upper extremities  R29.898 729.89   4. Abnormal posture  R29.3 781.92     QDASH: 25%    Subjective Evaluation    Pain  At best pain ratin  At worst pain ratin      Orly Rivas reports: that she's seen ~70% improvements in symptoms and function since the start of her POC. She still notes pain with lifting and carrying heavier items.        Objective        Static Posture     Head  Forward.    Shoulders  Rounded.    Scapulae  Left anteriorly tipped, right anteriorly tipped, left protracted and right protracted.    Thoracic Spine  Hyperkyphosis.    Palpation   Left   No palpable tenderness to the pectoralis major, pectoralis minor, supraspinatus and upper trapezius.   Hypertonic in the infraspinatus.   Tenderness of the cervical interspinals, infraspinatus and suboccipitals.     Right   No palpable tenderness to the pectoralis major, pectoralis minor and upper trapezius.   Hypertonic in the infraspinatus. Tenderness of the cervical interspinals, infraspinatus, suboccipitals and supraspinatus.     Active Range of Motion   Left Shoulder   Flexion: 143 degrees   Abduction: 140 degrees   External rotation BTH: T3   Internal rotation BTB: L3     Right Shoulder   Flexion: 145 degrees   Abduction: 130 degrees   External rotation BTH: T3   Internal rotation BTB: sacrum with pain    Additional Active Range of Motion Details  Decreased lower cervical extension  Decreased upper cervical flexion    L % decreased lower cervical motion  R SB 50% decreased  motion throughout cervical spine  L rot 90% with protraction  R rot 90% with protraction    Strength/Myotome Testing     Left Shoulder     Planes of Motion   Flexion: 4   Abduction: 4+   External rotation at 0°: 5   Internal rotation at 0°: 5     Isolated Muscles   Rhomboids: 4+     Right Shoulder     Planes of Motion   Flexion: 4   Abduction: 4- (p! R mild)   External rotation at 0°: 4+   Internal rotation at 0°: 4+     Isolated Muscles   Rhomboids: 4+     Tests   Cervical     Left   Negative Spurling's sign, ULTT1 and ULTT4.     Right   Positive ULTT1 and ULTT4.   Negative Spurling's sign.     Left Shoulder   Positive Hawkin's.   Negative belly press, drop arm, empty can (mild soreness) and external rotation lag sign.     Right Shoulder   Positive Hawkin's.   Negative belly press, drop arm, empty can (mild soreness), external rotation lag sign and Speed's.           Assessment & Plan       Assessment  Assessment details: Pt continues to present to PT with complaints of right more than left sided shoulder pain. She reports that she has made good improvements since starting therapy, stating ~70% improvements. Her QDASH score improved from 61% to 25% disability. Her pain remains moderate but is less than initially and she notes it is less frequent, now mostly during lifting and carrying heavy objects.  She has demonstrates some improvements in ROM with significantly less pain during AROM measurements. Her UE strength has improved with some mild discomfort during resisted muscle testing on the right. She demonstrates less pain during special testing.  She has met 4/4 STGs for therapy, with good progress toward her remaining goals.  She will continue to benefit from skilled PT services to address her remaining deficits and facilitate a return to her PLOF.    Goals  Plan Goals: ST. Pt will be independent and compliant with initial HEP in 2 weeks. MET  2. Pt will report a 50% improvement in symptoms since starting  therapy in 4 weeks. MET  3. Pt will report pain level at worst <4 during reaching activity in 4 weeks. MET  4. Pt will demonstrate an increase in R shoulder flexion ROM to 135 degrees pain free within 4 weeks. MET    LTG: - by DC (12 weeks)  1. Pt will be independent with final HEP for self-management of condition by DC.  2. Pt will improve score on QuickDASH to less than 25% impairment by DC.   3. Pt will report a 80% improvement in symptoms by DC in order to allow return to PLOF.  4. Pt will improve R shoulder abduction and flexion to at least 140 deg in order to be able to reach into cabinet to get cup or plate by DC.  5. Pt will improve B shoulder flexion strength to at least 4+/5 in order to be able to lift granddaughter by DC.       Plan  Therapy options: will be seen for skilled therapy services  Frequency: 1x week  Treatment plan discussed with: patient    Progress per Plan of Care      Conor Sierra PT  Physical Therapist  [employer] license #: [id number]

## 2023-11-21 NOTE — PROGRESS NOTES
Physical Therapy Daily Treatment and Progress Note  [facility] [place]  [address], [address]  [place] [address]     Patient: Orly Rivas  : 1950  Referring practitioner: Evelin Trevino APRN  Today's Date: 2023    VISIT#: 9    Visit Diagnoses:    ICD-10-CM ICD-9-CM   1. Chronic right shoulder pain  M25.511 719.41    G89.29 338.29   2. Chronic left shoulder pain  M25.512 719.41    G89.29 338.29   3. Weakness of both upper extremities  R29.898 729.89   4. Abnormal posture  R29.3 781.92     QDASH: 25%    Subjective Evaluation    Pain  At best pain ratin  At worst pain ratin      Orly Rivas reports: that she's seen ~70% improvements in symptoms and function since the start of her POC. She still notes pain with lifting and carrying heavier items.      Objective          Static Posture     Head  Forward.    Shoulders  Rounded.    Scapulae  Left anteriorly tipped, right anteriorly tipped, left protracted and right protracted.    Thoracic Spine  Hyperkyphosis.    Palpation   Left   No palpable tenderness to the pectoralis major, pectoralis minor, supraspinatus and upper trapezius.   Hypertonic in the infraspinatus.   Tenderness of the cervical interspinals, infraspinatus and suboccipitals.     Right   No palpable tenderness to the pectoralis major, pectoralis minor and upper trapezius.   Hypertonic in the infraspinatus. Tenderness of the cervical interspinals, infraspinatus, suboccipitals and supraspinatus.     Active Range of Motion   Left Shoulder   Flexion: 143 degrees   Abduction: 140 degrees   External rotation BTH: T3   Internal rotation BTB: L3     Right Shoulder   Flexion: 145 degrees   Abduction: 130 degrees   External rotation BTH: T3   Internal rotation BTB: sacrum with pain    Additional Active Range of Motion Details  Decreased lower cervical extension  Decreased upper cervical flexion    L % decreased lower cervical motion  R  SB 50% decreased motion throughout cervical spine  L rot 90% with protraction  R rot 90% with protraction    Strength/Myotome Testing     Left Shoulder     Planes of Motion   Flexion: 4   Abduction: 4+   External rotation at 0°: 5   Internal rotation at 0°: 5     Isolated Muscles   Rhomboids: 4+     Right Shoulder     Planes of Motion   Flexion: 4   Abduction: 4- (p! R mild)   External rotation at 0°: 4+   Internal rotation at 0°: 4+     Isolated Muscles   Rhomboids: 4+     Tests   Cervical     Left   Negative Spurling's sign, ULTT1 and ULTT4.     Right   Positive ULTT1 and ULTT4.   Negative Spurling's sign.     Left Shoulder   Positive Hawkin's.   Negative belly press, drop arm, empty can (mild soreness) and external rotation lag sign.     Right Shoulder   Positive Hawkin's.   Negative belly press, drop arm, empty can (mild soreness), external rotation lag sign and Speed's.         See Exercise, Manual, and Modality Logs for complete treatment.     Patient Education: assessment findings and progress; continued HEP        Assessment & Plan       Assessment  Assessment details: Pt continues to present to PT with complaints of right more than left sided shoulder pain. She reports that she has made good improvements since starting therapy, stating ~70% improvements. Her QDASH score improved from 61% to 25% disability. Her pain remains moderate but is less than initially and she notes it is less frequent, now mostly during lifting and carrying heavy objects.  She has demonstrates some improvements in ROM with significantly less pain during AROM measurements. Her UE strength has improved with some mild discomfort during resisted muscle testing on the right. She demonstrates less pain during special testing.  She has met 4/4 STGs for therapy, with good progress toward her remaining goals.  She will continue to benefit from skilled PT services to address her remaining deficits and facilitate a return to her  PLOF.    Goals  Plan Goals: ST. Pt will be independent and compliant with initial HEP in 2 weeks. MET  2. Pt will report a 50% improvement in symptoms since starting therapy in 4 weeks. MET  3. Pt will report pain level at worst <4 during reaching activity in 4 weeks. MET  4. Pt will demonstrate an increase in R shoulder flexion ROM to 135 degrees pain free within 4 weeks. MET    LTG: - by DC (12 weeks)  1. Pt will be independent with final HEP for self-management of condition by DC.  2. Pt will improve score on QuickDASH to less than 25% impairment by DC.   3. Pt will report a 80% improvement in symptoms by DC in order to allow return to PLOF.  4. Pt will improve R shoulder abduction and flexion to at least 140 deg in order to be able to reach into cabinet to get cup or plate by DC.  5. Pt will improve B shoulder flexion strength to at least 4+/5 in order to be able to lift granddaughter by DC.       Plan  Therapy options: will be seen for skilled therapy services  Frequency: 1x week  Treatment plan discussed with: patient    Progress per Plan of Care          Timed:         Manual Therapy:         mins  97239;     Therapeutic Exercise:    5     mins  48914;     Neuromuscular Dayo:    15    mins  19700;    Therapeutic Activity:     20    mins  42531;     Gait Training:           mins  09315;     Ultrasound:          mins  24955;    Ionto:                                   mins  62962  Self Care:                       15     mins  77547    Un-Timed:  Electrical Stimulation:         mins  02558 ( );  Dry Needling          mins self-pay  Traction          mins 38102  Re-Eval                               mins  00548  Group Therapy           ___ mins 84280    Timed Treatment:   55   mins   Total Treatment:     55   mins    Conor Sierra PT  Physical Therapist  [place] [facility] license #: [id number]

## 2023-11-27 ENCOUNTER — TREATMENT (OUTPATIENT)
Dept: PHYSICAL THERAPY | Facility: CLINIC | Age: 73
End: 2023-11-27
Payer: MEDICARE

## 2023-11-27 DIAGNOSIS — M25.511 CHRONIC RIGHT SHOULDER PAIN: Primary | ICD-10-CM

## 2023-11-27 DIAGNOSIS — R29.3 ABNORMAL POSTURE: ICD-10-CM

## 2023-11-27 DIAGNOSIS — M25.512 CHRONIC LEFT SHOULDER PAIN: ICD-10-CM

## 2023-11-27 DIAGNOSIS — G89.29 CHRONIC RIGHT SHOULDER PAIN: Primary | ICD-10-CM

## 2023-11-27 DIAGNOSIS — R29.898 WEAKNESS OF BOTH UPPER EXTREMITIES: ICD-10-CM

## 2023-11-27 DIAGNOSIS — G89.29 CHRONIC LEFT SHOULDER PAIN: ICD-10-CM

## 2023-11-27 PROCEDURE — 97112 NEUROMUSCULAR REEDUCATION: CPT | Performed by: PHYSICAL THERAPIST

## 2023-11-27 PROCEDURE — 97530 THERAPEUTIC ACTIVITIES: CPT | Performed by: PHYSICAL THERAPIST

## 2023-11-27 NOTE — PROGRESS NOTES
Physical Therapy Daily Treatment Note  [facility] Physical Therapy [place]  [address], [address]  [place] [id number]     Patient: Orly Rivas  : 1950  Referring practitioner: Evelin Trevino APRN  Today's Date: 2023    VISIT#: 10    Visit Diagnoses:    ICD-10-CM ICD-9-CM   1. Chronic right shoulder pain  M25.511 719.41    G89.29 338.29   2. Chronic left shoulder pain  M25.512 719.41    G89.29 338.29   3. Weakness of both upper extremities  R29.898 729.89   4. 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 Missed some exercises due to being busy.      Objective       See Exercise, Manual, and Modality Logs for complete treatment.     Patient Education: HEP update, cuing for posture during exercises      Assessment/Plan  Pt tolerated exercises well and feels like she's making gradual improvement. She demonstrated improving awareness of scapular position and increased scapular retraction during squeezes task. Added row and press to further challenge postural control and strength, tolerated and performed well.      Progress per Plan of Care          Timed:         Manual Therapy:         mins  52616;     Therapeutic Exercise:         mins  13690;     Neuromuscular Dayo:    15    mins  94510;    Therapeutic Activity:     25     mins  29114;     Gait Training:           mins  49933;     Ultrasound:          mins  33351;    Ionto:                                   mins  70584  Self Care:                            mins  10752    Un-Timed:  Electrical Stimulation:         mins  64823 ( );  Dry Needling          mins self-pay  Traction          mins 96286  Re-Eval                               mins  58525  Group Therapy           ___ mins 61925    Timed Treatment:   40   mins   Total Treatment:     40   mins    Conor Sierra  PT  Physical Therapist  [employer] [employer] license #: [id number]

## 2023-12-04 ENCOUNTER — TELEPHONE (OUTPATIENT)
Dept: ONCOLOGY | Facility: CLINIC | Age: 73
End: 2023-12-04
Payer: MEDICARE

## 2023-12-04 ENCOUNTER — TREATMENT (OUTPATIENT)
Dept: PHYSICAL THERAPY | Facility: CLINIC | Age: 73
End: 2023-12-04
Payer: MEDICARE

## 2023-12-04 DIAGNOSIS — R29.3 ABNORMAL POSTURE: ICD-10-CM

## 2023-12-04 DIAGNOSIS — R29.898 WEAKNESS OF BOTH UPPER EXTREMITIES: ICD-10-CM

## 2023-12-04 DIAGNOSIS — G89.29 CHRONIC LEFT SHOULDER PAIN: ICD-10-CM

## 2023-12-04 DIAGNOSIS — M25.512 CHRONIC LEFT SHOULDER PAIN: ICD-10-CM

## 2023-12-04 DIAGNOSIS — M25.511 CHRONIC RIGHT SHOULDER PAIN: Primary | ICD-10-CM

## 2023-12-04 DIAGNOSIS — G89.29 CHRONIC RIGHT SHOULDER PAIN: Primary | ICD-10-CM

## 2023-12-04 PROCEDURE — 97530 THERAPEUTIC ACTIVITIES: CPT | Performed by: PHYSICAL THERAPIST

## 2023-12-04 PROCEDURE — 97112 NEUROMUSCULAR REEDUCATION: CPT | Performed by: PHYSICAL THERAPIST

## 2023-12-04 NOTE — PROGRESS NOTES
Physical Therapy Daily Treatment Note  [facility] Physical Therapy [place]  [address], [address]  [place] [id number]     Patient: Orly Rivas  : 1950  Referring practitioner: Evelin Trevino APRN  Today's Date: 2023    VISIT#: 11    Visit Diagnoses:    ICD-10-CM ICD-9-CM   1. Chronic right shoulder pain  M25.511 719.41    G89.29 338.29   2. Chronic left shoulder pain  M25.512 719.41    G89.29 338.29   3. Weakness of both upper extremities  R29.898 729.89   4. Abnormal posture  R29.3 781.92       Subjective   Orly Rivas reports: that she's been doing well with the exercises at home, some lateral upper arm soreness.      Objective       See Exercise, Manual, and Modality Logs for complete treatment.     Patient Education: Modified STM with tennis ball technique.      Assessment/Plan  Pt tolerated session well overall however noted that chest press against theraband worsened some lateral upper arm soreness, with TOP and reproduction of symptoms noted from palpation to infraspinatus.   She notes some lumbar discomfort with seated thoracic extensions, with modification to technique with crossed legs improving symptoms.      Progress per Plan of Care          Timed:         Manual Therapy:         mins  72667;     Therapeutic Exercise:    5     mins  11807;     Neuromuscular Dayo:    15    mins  95902;    Therapeutic Activity:     10     mins  23378;     Gait Training:           mins  42770;     Ultrasound:          mins  44715;    Ionto:                                   mins  33836  Self Care:                            mins  08458    Un-Timed:  Electrical Stimulation:         mins  22798 ( );  Dry Needling          mins self-pay  Traction          mins 61861  Re-Eval                               mins  73224  Group Therapy           ___ mins 31010    Timed Treatment:   30   mins   Total Treatment:     60   mins    Conor Sierra, PT  Physical Therapist  [place]  PT license #: [id number]

## 2023-12-06 DIAGNOSIS — E78.2 MIXED HYPERLIPIDEMIA: Primary | ICD-10-CM

## 2023-12-06 DIAGNOSIS — E55.9 VITAMIN D DEFICIENCY: ICD-10-CM

## 2023-12-07 ENCOUNTER — TELEPHONE (OUTPATIENT)
Dept: FAMILY MEDICINE CLINIC | Facility: CLINIC | Age: 73
End: 2023-12-07
Payer: MEDICARE

## 2023-12-07 LAB
25(OH)D3+25(OH)D2 SERPL-MCNC: 85.9 NG/ML (ref 30–100)
ALBUMIN SERPL-MCNC: 4.6 G/DL (ref 3.5–5.2)
ALBUMIN/GLOB SERPL: 2.2 G/DL
ALP SERPL-CCNC: 75 U/L (ref 39–117)
ALT SERPL-CCNC: 19 U/L (ref 1–33)
AST SERPL-CCNC: 25 U/L (ref 1–32)
BASOPHILS # BLD AUTO: 0.05 10*3/MM3 (ref 0–0.2)
BASOPHILS NFR BLD AUTO: 1 % (ref 0–1.5)
BILIRUB SERPL-MCNC: 0.3 MG/DL (ref 0–1.2)
BUN SERPL-MCNC: 15 MG/DL (ref 8–23)
BUN/CREAT SERPL: 20.5 (ref 7–25)
CALCIUM SERPL-MCNC: 9.9 MG/DL (ref 8.6–10.5)
CHLORIDE SERPL-SCNC: 106 MMOL/L (ref 98–107)
CHOLEST SERPL-MCNC: 206 MG/DL (ref 0–200)
CO2 SERPL-SCNC: 28.5 MMOL/L (ref 22–29)
CREAT SERPL-MCNC: 0.73 MG/DL (ref 0.57–1)
EGFRCR SERPLBLD CKD-EPI 2021: 87.5 ML/MIN/1.73
EOSINOPHIL # BLD AUTO: 0.12 10*3/MM3 (ref 0–0.4)
EOSINOPHIL NFR BLD AUTO: 2.3 % (ref 0.3–6.2)
ERYTHROCYTE [DISTWIDTH] IN BLOOD BY AUTOMATED COUNT: 12 % (ref 12.3–15.4)
GLOBULIN SER CALC-MCNC: 2.1 GM/DL
GLUCOSE SERPL-MCNC: 100 MG/DL (ref 65–99)
HCT VFR BLD AUTO: 40.4 % (ref 34–46.6)
HDLC SERPL-MCNC: 69 MG/DL (ref 40–60)
HGB BLD-MCNC: 13.8 G/DL (ref 12–15.9)
IMM GRANULOCYTES # BLD AUTO: 0.01 10*3/MM3 (ref 0–0.05)
IMM GRANULOCYTES NFR BLD AUTO: 0.2 % (ref 0–0.5)
LDLC SERPL CALC-MCNC: 125 MG/DL (ref 0–100)
LYMPHOCYTES # BLD AUTO: 2.05 10*3/MM3 (ref 0.7–3.1)
LYMPHOCYTES NFR BLD AUTO: 39 % (ref 19.6–45.3)
MCH RBC QN AUTO: 30.9 PG (ref 26.6–33)
MCHC RBC AUTO-ENTMCNC: 34.2 G/DL (ref 31.5–35.7)
MCV RBC AUTO: 90.6 FL (ref 79–97)
MONOCYTES # BLD AUTO: 0.43 10*3/MM3 (ref 0.1–0.9)
MONOCYTES NFR BLD AUTO: 8.2 % (ref 5–12)
NEUTROPHILS # BLD AUTO: 2.59 10*3/MM3 (ref 1.7–7)
NEUTROPHILS NFR BLD AUTO: 49.3 % (ref 42.7–76)
NRBC BLD AUTO-RTO: 0 /100 WBC (ref 0–0.2)
PLATELET # BLD AUTO: 264 10*3/MM3 (ref 140–450)
POTASSIUM SERPL-SCNC: 4.3 MMOL/L (ref 3.5–5.2)
PROT SERPL-MCNC: 6.7 G/DL (ref 6–8.5)
RBC # BLD AUTO: 4.46 10*6/MM3 (ref 3.77–5.28)
SODIUM SERPL-SCNC: 142 MMOL/L (ref 136–145)
T4 FREE SERPL-MCNC: 1.01 NG/DL (ref 0.93–1.7)
TRIGL SERPL-MCNC: 69 MG/DL (ref 0–150)
TSH SERPL DL<=0.005 MIU/L-ACNC: 4.13 UIU/ML (ref 0.27–4.2)
VLDLC SERPL CALC-MCNC: 12 MG/DL (ref 5–40)
WBC # BLD AUTO: 5.25 10*3/MM3 (ref 3.4–10.8)

## 2023-12-07 NOTE — TELEPHONE ENCOUNTER
Even though pt has not had inj since Covid time frame, insurance still denied Prolia inj.  Pt will need to try oral medication first and one other inj prior to insurance paying. Pt may self pay, but one inj is approx 10,000

## 2023-12-08 ENCOUNTER — TELEPHONE (OUTPATIENT)
Dept: FAMILY MEDICINE CLINIC | Facility: CLINIC | Age: 73
End: 2023-12-08

## 2023-12-08 NOTE — TELEPHONE ENCOUNTER
Caller: Orly Rivas A    Relationship to patient: Self    Best call back number: [phone]    Patient is needing: THE PATIENT STATES THAT HER INSURANCE COMPANY WOULD NOT APPROVE THE PROLIA INJECTION. INSTEAD, THE INSURANCE RECOMMENDED A YEARLY INFUSION.    THE PATIENT ALSO NEEDS TO KNOW IF SHE NEEDS TO CONTINUE HER PHYSICAL THERAPY FOR JANUARY, 2024. THE PATIENT IS TRYING TO RECEIVE A CONTINUATION OF CARE FORM. PLEASE ADVISE.

## 2023-12-11 ENCOUNTER — TREATMENT (OUTPATIENT)
Dept: PHYSICAL THERAPY | Facility: CLINIC | Age: 73
End: 2023-12-11
Payer: MEDICARE

## 2023-12-11 DIAGNOSIS — M25.511 CHRONIC RIGHT SHOULDER PAIN: Primary | ICD-10-CM

## 2023-12-11 DIAGNOSIS — R29.898 WEAKNESS OF BOTH UPPER EXTREMITIES: ICD-10-CM

## 2023-12-11 DIAGNOSIS — R29.3 ABNORMAL POSTURE: ICD-10-CM

## 2023-12-11 DIAGNOSIS — G89.29 CHRONIC RIGHT SHOULDER PAIN: Primary | ICD-10-CM

## 2023-12-11 DIAGNOSIS — G89.29 CHRONIC LEFT SHOULDER PAIN: ICD-10-CM

## 2023-12-11 DIAGNOSIS — M25.512 CHRONIC LEFT SHOULDER PAIN: ICD-10-CM

## 2023-12-12 NOTE — TELEPHONE ENCOUNTER
She only needs to continue Physical therapy if she and the therapist feel she continues to get benefit from it.  I will complete any forms she needs from my ends  Evelin

## 2023-12-22 ENCOUNTER — TREATMENT (OUTPATIENT)
Dept: PHYSICAL THERAPY | Facility: CLINIC | Age: 73
End: 2023-12-22
Payer: MEDICARE

## 2023-12-22 DIAGNOSIS — R29.3 ABNORMAL POSTURE: ICD-10-CM

## 2023-12-22 DIAGNOSIS — M25.512 CHRONIC LEFT SHOULDER PAIN: ICD-10-CM

## 2023-12-22 DIAGNOSIS — R29.898 WEAKNESS OF BOTH UPPER EXTREMITIES: ICD-10-CM

## 2023-12-22 DIAGNOSIS — G89.29 CHRONIC LEFT SHOULDER PAIN: ICD-10-CM

## 2023-12-22 DIAGNOSIS — M25.511 CHRONIC RIGHT SHOULDER PAIN: Primary | ICD-10-CM

## 2023-12-22 DIAGNOSIS — G89.29 CHRONIC RIGHT SHOULDER PAIN: Primary | ICD-10-CM

## 2023-12-22 NOTE — LETTER
Physical Therapy Progress Note  [facility] Physical Therapy [place]  [address], [address]  [place] [address]     Patient: Orly Rivas  : 1950  Referring practitioner: Evelin Trevino APRN  Today's Date: 2023    VISIT#: 13    Visit Diagnoses:    ICD-10-CM ICD-9-CM   1. Chronic right shoulder pain  M25.511 719.41    G89.29 338.29   2. Chronic left shoulder pain  M25.512 719.41    G89.29 338.29   3. Weakness of both upper extremities  R29.898 729.89   4. Abnormal posture  R29.3 781.92     QDASH: 52%    Subjective Evaluation    Pain  At worst pain ratin    Orly Rivas reports: that for the last 3 days her right shoulder and neck have been painful. She's unsure of the reason for the exacerbation, but that she was taking care of her granddaughter as normal. She feels like the pain was very limiting of function.      Objective          Static Posture     Head  Forward.    Shoulders  Rounded.    Scapulae  Left anteriorly tipped, right anteriorly tipped, left protracted and right protracted.    Thoracic Spine  Hyperkyphosis.    Palpation   Left   No palpable tenderness to the pectoralis major, pectoralis minor, supraspinatus and upper trapezius.   Hypertonic in the infraspinatus.   Tenderness of the cervical interspinals, infraspinatus and suboccipitals.     Right   No palpable tenderness to the pectoralis major and pectoralis minor.   Hypertonic in the infraspinatus. Tenderness of the cervical interspinals, infraspinatus, suboccipitals, supraspinatus and upper trapezius.     Active Range of Motion   Left Shoulder   Flexion: 135 degrees   Abduction: 140 degrees   External rotation BTH: T3   Internal rotation BTB: L3     Right Shoulder   Flexion: 137 degrees   Abduction: 130 degrees   External rotation BTH: T3   Internal rotation BTB: sacrum with pain    Additional Active Range of Motion Details  Decreased lower cervical extension  Decreased upper cervical flexion    L  SB 90% decreased lower cervical motion  R SB 60% decreased motion throughout cervical spine  L rot 90% with protraction  R rot 90% with protraction    Strength/Myotome Testing     Left Shoulder     Planes of Motion   Flexion: 4   Abduction: 4+   External rotation at 0°: 5   Internal rotation at 0°: 5     Isolated Muscles   Rhomboids: 4+     Right Shoulder     Planes of Motion   Flexion: 4   Abduction: 4   External rotation at 0°: 4+   Internal rotation at 0°: 4+     Isolated Muscles   Rhomboids: 4+     Tests   Cervical     Left   Negative Spurling's sign, ULTT1 and ULTT4.     Right   Positive ULTT1 and ULTT4.   Negative Spurling's sign.     Left Shoulder   Negative belly press, drop arm, empty can (mild soreness), external rotation lag sign and Hawkin's.     Right Shoulder   Positive Hawkin's.   Negative belly press, drop arm, empty can (mild soreness), external rotation lag sign and Speed's.             Assessment & Plan       Assessment  Assessment details: Pt continues to present to PT with complaints of right more than left sided shoulder pain. She reports a recent exacerbation that has led to increase in pain and her QDASH score worsened to 52%. Her pain is better after the initial exacerbation but has been limiting.  She has demonstrates some regression in ROM with no pain during AROM measurements. Her UE strength has improved with some mild discomfort during resisted muscle testing on the right. She demonstrates less pain during special testing.  She has met 4/4 STGs for therapy, with good progress toward her remaining goals.  She will continue to benefit from skilled PT services to address her remaining deficits and facilitate a return to her PLOF.    Goals  Plan Goals: ST. Pt will be independent and compliant with initial HEP in 2 weeks. MET  2. Pt will report a 50% improvement in symptoms since starting therapy in 4 weeks. MET  3. Pt will report pain level at worst <4 during reaching activity in 4  weeks. MET  4. Pt will demonstrate an increase in R shoulder flexion ROM to 135 degrees pain free within 4 weeks. MET    LTG: - by DC (12 weeks)  1. Pt will be independent with final HEP for self-management of condition by DC.  2. Pt will improve score on QuickDASH to less than 25% impairment by DC.   3. Pt will report a 80% improvement in symptoms by DC in order to allow return to PLOF.  4. Pt will improve R shoulder abduction and flexion to at least 140 deg in order to be able to reach into cabinet to get cup or plate by DC.  5. Pt will improve B shoulder flexion strength to at least 4+/5 in order to be able to lift granddaughter by DC.       Plan  Therapy options: will be seen for skilled therapy services  Frequency: 1x week  Duration in weeks: 4  Treatment plan discussed with: patient      Conor Rigo, PT  Physical Therapist  [employer] license #: [id number]

## 2023-12-22 NOTE — PROGRESS NOTES
Physical Therapy Daily Treatment and Progress Note  [facility] Physical Therapy [place]  [address], [address]  [place] [address]     Patient: Orly Rivas  : 1950  Referring practitioner: Evelin Trevino APRN  Today's Date: 2023    VISIT#: 13    Visit Diagnoses:    ICD-10-CM ICD-9-CM   1. Chronic right shoulder pain  M25.511 719.41    G89.29 338.29   2. Chronic left shoulder pain  M25.512 719.41    G89.29 338.29   3. Weakness of both upper extremities  R29.898 729.89   4. Abnormal posture  R29.3 781.92     QDASH: 52%    Subjective Evaluation    Pain  At worst pain ratin    Orly Rivas reports: that for the last 3 days her right shoulder and neck have been painful. She's unsure of the reason for the exacerbation, but that she was taking care of her granddaughter as normal. She feels like the pain was very limiting of function.      Objective          Static Posture     Head  Forward.    Shoulders  Rounded.    Scapulae  Left anteriorly tipped, right anteriorly tipped, left protracted and right protracted.    Thoracic Spine  Hyperkyphosis.    Palpation   Left   No palpable tenderness to the pectoralis major, pectoralis minor, supraspinatus and upper trapezius.   Hypertonic in the infraspinatus.   Tenderness of the cervical interspinals, infraspinatus and suboccipitals.     Right   No palpable tenderness to the pectoralis major and pectoralis minor.   Hypertonic in the infraspinatus. Tenderness of the cervical interspinals, infraspinatus, suboccipitals, supraspinatus and upper trapezius.     Active Range of Motion   Left Shoulder   Flexion: 135 degrees   Abduction: 140 degrees   External rotation BTH: T3   Internal rotation BTB: L3     Right Shoulder   Flexion: 137 degrees   Abduction: 130 degrees   External rotation BTH: T3   Internal rotation BTB: sacrum with pain    Additional Active Range of Motion Details  Decreased lower cervical extension  Decreased upper  cervical flexion    L SB 90% decreased lower cervical motion  R SB 60% decreased motion throughout cervical spine  L rot 90% with protraction  R rot 90% with protraction    Strength/Myotome Testing     Left Shoulder     Planes of Motion   Flexion: 4   Abduction: 4+   External rotation at 0°: 5   Internal rotation at 0°: 5     Isolated Muscles   Rhomboids: 4+     Right Shoulder     Planes of Motion   Flexion: 4   Abduction: 4   External rotation at 0°: 4+   Internal rotation at 0°: 4+     Isolated Muscles   Rhomboids: 4+     Tests   Cervical     Left   Negative Spurling's sign, ULTT1 and ULTT4.     Right   Positive ULTT1 and ULTT4.   Negative Spurling's sign.     Left Shoulder   Negative belly press, drop arm, empty can (mild soreness), external rotation lag sign and Hawkin's.     Right Shoulder   Positive Hawkin's.   Negative belly press, drop arm, empty can (mild soreness), external rotation lag sign and Speed's.         See Exercise, Manual, and Modality Logs for complete treatment.     Patient Education: exercise progression with focus on mobility and strength; assessment findings and progress      Assessment & Plan       Assessment  Assessment details: Pt continues to present to PT with complaints of right more than left sided shoulder pain. She reports a recent exacerbation that has led to increase in pain and her QDASH score worsened to 52%. Her pain is better after the initial exacerbation but has been limiting.  She has demonstrates some regression in ROM with no pain during AROM measurements. Her UE strength has improved with some mild discomfort during resisted muscle testing on the right. She demonstrates less pain during special testing.  She has met 4/4 STGs for therapy, with good progress toward her remaining goals.  She will continue to benefit from skilled PT services to address her remaining deficits and facilitate a return to her PLOF.    Goals  Plan Goals: ST. Pt will be independent and  compliant with initial HEP in 2 weeks. MET  2. Pt will report a 50% improvement in symptoms since starting therapy in 4 weeks. MET  3. Pt will report pain level at worst <4 during reaching activity in 4 weeks. MET  4. Pt will demonstrate an increase in R shoulder flexion ROM to 135 degrees pain free within 4 weeks. MET    LTG: - by DC (12 weeks)  1. Pt will be independent with final HEP for self-management of condition by DC.  2. Pt will improve score on QuickDASH to less than 25% impairment by DC.   3. Pt will report a 80% improvement in symptoms by DC in order to allow return to PLOF.  4. Pt will improve R shoulder abduction and flexion to at least 140 deg in order to be able to reach into cabinet to get cup or plate by DC.  5. Pt will improve B shoulder flexion strength to at least 4+/5 in order to be able to lift granddaughter by DC.       Plan  Therapy options: will be seen for skilled therapy services  Frequency: 1x week  Duration in weeks: 4  Treatment plan discussed with: patient         Timed:         Manual Therapy:    8     mins  69950;     Therapeutic Exercise:         mins  40853;     Neuromuscular Dayo:    6    mins  49805;    Therapeutic Activity:     10     mins  67029;     Gait Training:           mins  19834;     Ultrasound:          mins  44605;    Ionto:                                   mins  88196  Self Care:                       20     mins  46335    Un-Timed:  Electrical Stimulation:         mins  76253 ( );  Dry Needling          mins self-pay  Traction          mins 34426  Re-Eval                               mins  69961  Group Therapy           ___ mins 22877    Timed Treatment:   44   mins   Total Treatment:     44   mins    Conor Sierra PT  Physical Therapist  [place] PT license #: [id number]

## 2023-12-26 ENCOUNTER — TREATMENT (OUTPATIENT)
Dept: PHYSICAL THERAPY | Facility: CLINIC | Age: 73
End: 2023-12-26
Payer: MEDICARE

## 2023-12-26 DIAGNOSIS — R29.3 ABNORMAL POSTURE: ICD-10-CM

## 2023-12-26 DIAGNOSIS — G89.29 CHRONIC RIGHT SHOULDER PAIN: Primary | ICD-10-CM

## 2023-12-26 DIAGNOSIS — R29.898 WEAKNESS OF BOTH UPPER EXTREMITIES: ICD-10-CM

## 2023-12-26 DIAGNOSIS — M25.511 CHRONIC RIGHT SHOULDER PAIN: Primary | ICD-10-CM

## 2023-12-26 DIAGNOSIS — M25.512 CHRONIC LEFT SHOULDER PAIN: ICD-10-CM

## 2023-12-26 DIAGNOSIS — G89.29 CHRONIC LEFT SHOULDER PAIN: ICD-10-CM

## 2023-12-26 NOTE — PROGRESS NOTES
Physical Therapy Daily Treatment Note  [facility] Physical Therapy [place]  [address], [address]  [place] [address]     Patient: Orly Rivas  : 1950  Referring practitioner: Evelin Trevino APRN  Today's Date: 2023    VISIT#: 14    Visit Diagnoses:    ICD-10-CM ICD-9-CM   1. Chronic right shoulder pain  M25.511 719.41    G89.29 338.29   2. Chronic left shoulder pain  M25.512 719.41    G89.29 338.29   3. Weakness of both upper extremities  R29.898 729.89   4. Abnormal posture  R29.3 781.92       Subjective   Orly Rivas reports: continued good compliance with HEP. She notes that she had a busy day and didn't end up as sore as she usually would. She reports that supine B shoulder flexion exercise seems to ease symptoms, as does open books.      Objective       See Exercise, Manual, and Modality Logs for complete treatment.     Patient Education: HEP revision, review, update.       Assessment/Plan  Pt tolerated session well overall with some difficulty noted during new standing shoulder flexion/abduction task with postural cuing from wall. She reports some neck and shoulder discomfort during ball pass task, improving with use of thoracic and cervical mobility tasks. She demonstrates improving tolerance of resisted exercises with better awareness of posture, and seems to overall have more control of symptoms. She is making good progress.      Progress per Plan of Care          Timed:         Manual Therapy:         mins  28450;     Therapeutic Exercise:         mins  59857;     Neuromuscular Dayo:    15    mins  66656;    Therapeutic Activity:     25     mins  76276;     Gait Training:           mins  81826;     Ultrasound:          mins  16300;    Ionto:                                   mins  06139  Self Care:                       5     mins  41446    Un-Timed:  Electrical Stimulation:         mins  78451 ( );  Dry Needling          mins self-pay  Traction           mins 71322  Re-Eval                               mins  31652  Group Therapy           ___ mins 55313    Timed Treatment:   45   mins   Total Treatment:     45   mins    Conor Sierra PT  Physical Therapist  [employer] license #: [id number]

## 2023-12-29 ENCOUNTER — OFFICE VISIT (OUTPATIENT)
Dept: FAMILY MEDICINE CLINIC | Facility: CLINIC | Age: 73
End: 2023-12-29
Payer: MEDICARE

## 2023-12-29 VITALS
WEIGHT: 105 LBS | HEART RATE: 92 BPM | DIASTOLIC BLOOD PRESSURE: 64 MMHG | BODY MASS INDEX: 19.32 KG/M2 | SYSTOLIC BLOOD PRESSURE: 100 MMHG | HEIGHT: 62 IN | RESPIRATION RATE: 16 BRPM | OXYGEN SATURATION: 99 %

## 2023-12-29 DIAGNOSIS — M25.512 ACUTE PAIN OF BOTH SHOULDERS: ICD-10-CM

## 2023-12-29 DIAGNOSIS — M79.631 PAIN IN BOTH FOREARMS: ICD-10-CM

## 2023-12-29 DIAGNOSIS — M81.0 OSTEOPOROSIS, POST-MENOPAUSAL: Primary | ICD-10-CM

## 2023-12-29 DIAGNOSIS — G43.009 ATYPICAL MIGRAINE: ICD-10-CM

## 2023-12-29 DIAGNOSIS — M79.632 PAIN IN BOTH FOREARMS: ICD-10-CM

## 2023-12-29 DIAGNOSIS — M25.511 ACUTE PAIN OF BOTH SHOULDERS: ICD-10-CM

## 2023-12-29 PROCEDURE — 99214 OFFICE O/P EST MOD 30 MIN: CPT | Performed by: NURSE PRACTITIONER

## 2023-12-29 RX ORDER — CYCLOBENZAPRINE HCL 5 MG
TABLET ORAL
Qty: 30 TABLET | Refills: 1 | Status: SHIPPED | OUTPATIENT
Start: 2023-12-29

## 2023-12-29 RX ORDER — CODEINE/BUTALBITAL/ASA/CAFFEIN 30-50-325
1 CAPSULE ORAL EVERY 4 HOURS PRN
Qty: 60 CAPSULE | Refills: 0 | Status: SHIPPED | OUTPATIENT
Start: 2023-12-29 | End: 2024-01-28

## 2023-12-29 RX ORDER — ZOLEDRONIC ACID 5 MG/100ML
5 INJECTION, SOLUTION INTRAVENOUS ONCE
Qty: 100 ML | Refills: 0 | Status: SHIPPED | OUTPATIENT
Start: 2023-12-29 | End: 2023-12-29

## 2023-12-29 NOTE — PROGRESS NOTES
Chief Complaint  Migraine    Subjective          Orly presents to [facility] is a 73-year-old female for follow-up on migraines, osteoporosis and continued pain in her shoulders and forearms that have been improving over the past couple of months.  To refill medications, review labs overall doing well      History of atypical migraines.  Onset was several years ago.  She is currently taking Fioricet.  These do manage her symptoms well.  She denies any medication side effects.  She takes medicine as needed 1-3 times per week this has been slowly decreasing over the past couple of months as she has been doing physical therapy.  She is down to having migraines 1-2 times per week with no aura.  She denies any nausea or vomiting.  She tends to have some slight increase in frequency during season changes and with allergies.  No changes in intensity.      Osteoporosis-she did well on Prolia injections in the past.  Her insurance now declining coverage.  She is not able to tolerate biphosphonate's because she cannot swallow pills she has to crush them.  She is willing to try Reclast infusion.    She does have some continued pain in her shoulders bilaterally and forearms.  She has been going to physical therapy and doing her exercises as directed.  She has noticed some improvements and does plan to continue follow-up.  She does take Flexeril only occasionally to help her rest.  This medication works well and she denies any side effects    She has no other acute complaints of in office today    The following portions of the patient's history were reviewed and updated as appropriate: allergies, current medications, past family history, past medical history, past social history, past surgical history, and problem list      Review of Systems   Constitutional:  Negative for chills, fatigue and fever.   Eyes:  Negative for visual disturbance.   Respiratory:  Negative for cough, shortness of breath  "and wheezing.    Cardiovascular:  Negative for chest pain, palpitations and leg swelling.   Gastrointestinal:  Negative for abdominal pain, diarrhea, nausea and vomiting.   Musculoskeletal:  Positive for arthralgias.   Skin:  Negative for rash.   Neurological:  Negative for dizziness and light-headedness.   Psychiatric/Behavioral:  Negative for self-injury, sleep disturbance and suicidal ideas. The patient is not nervous/anxious.         Objective   Vital Signs:   Vitals:    12/29/23 1506   BP: 100/64   Pulse: 92   Resp: 16   SpO2: 99%   Weight: 47.6 kg (105 lb)   Height: 157 cm (61.81\")        BMI is within normal parameters. No other follow-up for BMI required.        Physical Exam  Vitals reviewed.   Constitutional:       General: She is not in acute distress.  Eyes:      Conjunctiva/sclera: Conjunctivae normal.   Neck:      Thyroid: No thyromegaly.      Vascular: No carotid bruit.   Cardiovascular:      Rate and Rhythm: Normal rate and regular rhythm.      Heart sounds: Normal heart sounds.   Pulmonary:      Effort: Pulmonary effort is normal. No respiratory distress.      Breath sounds: Normal breath sounds. No stridor. No wheezing, rhonchi or rales.   Chest:      Chest wall: No tenderness.   Neurological:      Mental Status: She is alert and oriented to person, place, and time.   Psychiatric:         Attention and Perception: Attention normal.         Mood and Affect: Mood normal.          Result Review :     The following data was reviewed by: CHANTEL Peñaloza on 12/29/2023:  Vitamin D,25-Hydroxy (12/06/2023 10:23)  T4, Free (12/06/2023 10:23)  TSH (12/06/2023 10:23)  Lipid Panel (12/06/2023 10:23)  CBC & Differential (12/06/2023 10:23)  Comprehensive Metabolic Panel (12/06/2023 10:23)    No changes on labs-  cholesterol still just slightly elevated-  Will continue to montior  No need for any changes to medication at this time     Assessment and Plan    Diagnoses and all orders for this visit:    1. " Osteoporosis, post-menopausal (Primary)  -     zoledronic acid (RECLAST) 5 MG/100ML solution infusion; Infuse 100 mL into a venous catheter 1 (One) Time for 1 dose.  Dispense: 100 mL; Refill: 0  -     Ambulatory Referral to [facility] For Infusion Treatment    2. Atypical migraine  Comments:  Controlled continue current management  Report any increase or changes in symptoms  Orders:  -     butalbital-aspirin-caffeine-codeine (Fiorinal/Codeine #3) -75-30 MG capsule; Take 1 capsule by mouth Every 4 (Four) Hours As Needed for Headache for up to 30 days.  Dispense: 60 capsule; Refill: 0    3. Acute pain of both shoulders  Comments:  Continue topical Rx  Trial Flexeril  Avoid alcohol or Tylenol while taking muscle relaxer  We will refer to PT if no improvement  Orders:  -     cyclobenzaprine (FLEXERIL) 5 MG tablet; 1-2 tabs PO TID PRN spasms  Dispense: 30 tablet; Refill: 1    4. Pain in both forearms  Comments:  Continue topical Rx  Trial Flexeril  Avoid alcohol or Tylenol while taking muscle relaxer  We will refer to PT if no improvement  Orders:  -     cyclobenzaprine (FLEXERIL) 5 MG tablet; 1-2 tabs PO TID PRN spasms  Dispense: 30 tablet; Refill: 1        Follow Up   Return in about 3 months (around 3/29/2024) for Follow up/ Medication Check.  Patient was given instructions and counseling regarding her condition or for health maintenance advice. Please see specific information pulled into the AVS if appropriate.

## 2024-01-02 ENCOUNTER — TELEPHONE (OUTPATIENT)
Dept: FAMILY MEDICINE CLINIC | Facility: CLINIC | Age: 74
End: 2024-01-02
Payer: MEDICARE

## 2024-01-02 NOTE — TELEPHONE ENCOUNTER
[facility] sent a message on patients referral for the infusion for osteoporosis. Insurance denied and said has to try oral first.

## 2024-01-03 ENCOUNTER — TREATMENT (OUTPATIENT)
Dept: PHYSICAL THERAPY | Facility: CLINIC | Age: 74
End: 2024-01-03
Payer: MEDICARE

## 2024-01-03 DIAGNOSIS — G89.29 CHRONIC LEFT SHOULDER PAIN: ICD-10-CM

## 2024-01-03 DIAGNOSIS — G89.29 CHRONIC RIGHT SHOULDER PAIN: Primary | ICD-10-CM

## 2024-01-03 DIAGNOSIS — M25.511 CHRONIC RIGHT SHOULDER PAIN: Primary | ICD-10-CM

## 2024-01-03 DIAGNOSIS — R29.3 ABNORMAL POSTURE: ICD-10-CM

## 2024-01-03 DIAGNOSIS — M25.512 CHRONIC LEFT SHOULDER PAIN: ICD-10-CM

## 2024-01-03 DIAGNOSIS — R29.898 WEAKNESS OF BOTH UPPER EXTREMITIES: ICD-10-CM

## 2024-01-03 NOTE — TELEPHONE ENCOUNTER
I put Evelin's note on the referral and sent back to the infusion team to see if they can get that approved.

## 2024-01-03 NOTE — TELEPHONE ENCOUNTER
I wrote in my note she is not able to take oral biphosphonate's because that cannot be crushed  She cannot take oral medication

## 2024-01-03 NOTE — PROGRESS NOTES
Physical Therapy Daily Treatment Note  [facility] [place]  [address], [address]  [place] [address]     Patient: Orly Rivas  : 1950  Referring practitioner: Evelin Trevino APRN  Today's Date: 1/3/2024    VISIT#: 15    Visit Diagnoses:    ICD-10-CM ICD-9-CM   1. Chronic right shoulder pain  M25.511 719.41    G89.29 338.29   2. Chronic left shoulder pain  M25.512 719.41    G89.29 338.29   3. Weakness of both upper extremities  R29.898 729.89   4. Abnormal posture  R29.3 781.92       Subjective   Orly Rivas reports: that her right shoulder was sore last night after going to the store with repetitive reaching and carrying motions.      Objective       See Exercise, Manual, and Modality Logs for complete treatment.     Patient Education: HEP review, posture with HEP, plans for next session. Likely decreasing POC frequency.      Assessment/Plan  Pt tolerated session well overall with some difficulty continuing during posture stabilized lateral and front raise task. She noted good thoracic extension type stretch during manual therapy and new ball roll exercise. She required some cuing for better technique with hand behind back strengthening task and ball pass exercises added recently. Discussed planning for decreasing POC next session if function continues to improve with slowly lower pain levels.      Progress per Plan of Care          Timed:         Manual Therapy:    5     mins  78746;     Therapeutic Exercise:    8     mins  68613;     Neuromuscular Dayo:    15    mins  46568;    Therapeutic Activity:     20     mins  65520;     Gait Training:           mins  41803;     Ultrasound:          mins  98546;    Ionto:                                   mins  58666  Self Care:                       5     mins  93947    Un-Timed:  Electrical Stimulation:         mins  64035 ( );  Dry Needling          mins self-pay  Traction          mins 84106  Re-Eval                                mins  61494  Group Therapy           ___ mins 61562    Timed Treatment:   53   mins   Total Treatment:     53   mins    Conor Sierra PT  Physical Therapist  [employer] license #: [id number]

## 2024-01-05 DIAGNOSIS — M81.0 OSTEOPOROSIS, POST-MENOPAUSAL: Primary | ICD-10-CM

## 2024-01-10 ENCOUNTER — TREATMENT (OUTPATIENT)
Dept: PHYSICAL THERAPY | Facility: CLINIC | Age: 74
End: 2024-01-10
Payer: MEDICARE

## 2024-01-10 DIAGNOSIS — G89.29 CHRONIC RIGHT SHOULDER PAIN: Primary | ICD-10-CM

## 2024-01-10 DIAGNOSIS — R29.3 ABNORMAL POSTURE: ICD-10-CM

## 2024-01-10 DIAGNOSIS — R29.898 WEAKNESS OF BOTH UPPER EXTREMITIES: ICD-10-CM

## 2024-01-10 DIAGNOSIS — M25.511 CHRONIC RIGHT SHOULDER PAIN: Primary | ICD-10-CM

## 2024-01-10 DIAGNOSIS — M25.512 CHRONIC LEFT SHOULDER PAIN: ICD-10-CM

## 2024-01-10 DIAGNOSIS — G89.29 CHRONIC LEFT SHOULDER PAIN: ICD-10-CM

## 2024-01-10 NOTE — PROGRESS NOTES
Physical Therapy Daily Treatment Note  [facility] [place]  [address], [address]  [place] [address]     Patient: Orly Rivas  : 1950  Referring practitioner: Evelin Trevino APRN  Today's Date: 1/10/2024    VISIT#: 16    Visit Diagnoses:    ICD-10-CM ICD-9-CM   1. Chronic right shoulder pain  M25.511 719.41    G89.29 338.29   2. Chronic left shoulder pain  M25.512 719.41    G89.29 338.29   3. Weakness of both upper extremities  R29.898 729.89   4. Abnormal posture  R29.3 781.92       Subjective   Orly Rivas reports: that her shoulders have been doing quite well even though she went to the grocery store with reaching. She had family member carry items in basket and handle heavier groceries though.      Objective       See Exercise, Manual, and Modality Logs for complete treatment.     Patient Education: HEP update with modifications to exercises including thoracic extension tasks. Decrease POC to trial independent management.      Assessment/Plan  Pt demonstrates gradual improvements in shoulder mobility with less difficulty evident today during front raises with postural support from doorframe. She still notes more difficulty with lateral raise motion (abduction). Discussed HEP modifications to further target thoracic, scapular, and glenohumeral mobility and discussed trialing period of independent management before possible discharge.      Progress per Plan of Care          Timed:         Manual Therapy:         mins  42593;     Therapeutic Exercise:    10     mins  72633;     Neuromuscular Dayo:    10    mins  88084;    Therapeutic Activity:     15     mins  22858;     Gait Training:           mins  17380;     Ultrasound:          mins  32945;    Ionto:                                   mins  30518  Self Care:                            mins  55527    Un-Timed:  Electrical Stimulation:         mins  99738 ( );  Dry Needling          mins  self-pay  Traction          mins 75918  Re-Eval                               mins  91564  Group Therapy           ___ mins 40738    Timed Treatment:   35   mins   Total Treatment:     60   mins    Conor Sierra PT  Physical Therapist  [employer] [employer] license #: [id number]

## 2024-02-07 ENCOUNTER — TREATMENT (OUTPATIENT)
Dept: PHYSICAL THERAPY | Facility: CLINIC | Age: 74
End: 2024-02-07
Payer: MEDICARE

## 2024-02-07 DIAGNOSIS — M25.512 CHRONIC LEFT SHOULDER PAIN: ICD-10-CM

## 2024-02-07 DIAGNOSIS — G89.29 CHRONIC LEFT SHOULDER PAIN: ICD-10-CM

## 2024-02-07 DIAGNOSIS — R29.3 ABNORMAL POSTURE: ICD-10-CM

## 2024-02-07 DIAGNOSIS — G89.29 CHRONIC RIGHT SHOULDER PAIN: Primary | ICD-10-CM

## 2024-02-07 DIAGNOSIS — M25.511 CHRONIC RIGHT SHOULDER PAIN: Primary | ICD-10-CM

## 2024-02-07 DIAGNOSIS — R29.898 WEAKNESS OF BOTH UPPER EXTREMITIES: ICD-10-CM

## 2024-02-07 NOTE — PROGRESS NOTES
Re-Certification/Plan of Care and Discharge    [facility] [place]  [address], [address]  [place] [address]     Patient: Orly Rivas   : 1950  Referring practitioner: Evelin Trevino APRN  Date of Initial Visit: Type: THERAPY  Noted: 10/12/2023  Today's Date: 2024  Patient seen for 17 sessions      Visit Diagnoses:    ICD-10-CM ICD-9-CM   1. Chronic right shoulder pain  M25.511 719.41    G89.29 338.29   2. Chronic left shoulder pain  M25.512 719.41    G89.29 338.29   3. Weakness of both upper extremities  R29.898 729.89   4. Abnormal posture  R29.3 781.92       Subjective Questionnaire: QuickDASH: 36.36%    Subjective:   Subjective   Orly Rivas reports: that her neck has been getting sore at times, usually when lifting, pushing, pulling. She reports that her shoulders haven't been too bad lately.  She feels that she's around 80% improved since the start of her POC.    Clinical Progress: improved  Home Program Compliance: Yes      Objective          Static Posture     Head  Forward.    Shoulders  Rounded.    Scapulae  Left anteriorly tipped, right anteriorly tipped, left protracted and right protracted.    Thoracic Spine  Hyperkyphosis.    Palpation   Left   No palpable tenderness to the pectoralis major, pectoralis minor, supraspinatus and upper trapezius.   Hypertonic in the infraspinatus.   Tenderness of the cervical interspinals, infraspinatus and suboccipitals.     Right   No palpable tenderness to the pectoralis major and pectoralis minor.   Hypertonic in the infraspinatus. Tenderness of the cervical interspinals, infraspinatus, suboccipitals, supraspinatus and upper trapezius.     Active Range of Motion   Left Shoulder   Flexion: 145 degrees   Abduction: 141 degrees   External rotation BTH: T3   Internal rotation BTB: L3     Right Shoulder   Flexion: 143 degrees   Abduction: 137 degrees   External rotation BTH: T3   Internal rotation BTB: L5  with pain    Additional Active Range of Motion Details  Ext 100% Decreased lower cervical extension  Flex 100% Decreased upper cervical flexion    L SB 90% decreased lower cervical motion  R SB 60% decreased motion throughout cervical spine  L rot 90% with protraction  R rot 90% with protraction    Strength/Myotome Testing     Left Shoulder     Planes of Motion   Flexion: 4   Abduction: 4+   External rotation at 0°: 5   Internal rotation at 0°: 5     Isolated Muscles   Rhomboids: 4+     Right Shoulder     Planes of Motion   Flexion: 4+   Abduction: 4   External rotation at 0°: 4+   Internal rotation at 0°: 5     Isolated Muscles   Rhomboids: 4+     Tests   Cervical     Left   Negative Spurling's sign, ULTT1 and ULTT4.     Right   Positive ULTT1 and ULTT4.   Negative Spurling's sign.     Left Shoulder   Negative belly press, drop arm, empty can (mild soreness), external rotation lag sign and Hawkin's.     Right Shoulder   Positive Hawkin's.   Negative belly press, drop arm, empty can (mild soreness), external rotation lag sign and Speed's.             Assessment & Plan       Assessment  Assessment details: Pt has demonstrated reasonably good overall progress during her POC to date. Her shoulder ROM has improved with increased UE strength bilaterally noted.  She has met 4/4 STGs and 3/5 LTGs for therapy, with some progress toward her other goals.  She demonstrates a good understanding of her pathology and HEP with plans to continue to progress strengthening and stretching exercises in future. She is suitable for discharge at this time.    Goals  Plan Goals: ST. Pt will be independent and compliant with initial HEP in 2 weeks. MET  2. Pt will report a 50% improvement in symptoms since starting therapy in 4 weeks. MET  3. Pt will report pain level at worst <4 during reaching activity in 4 weeks. MET  4. Pt will demonstrate an increase in R shoulder flexion ROM to 135 degrees pain free within 4 weeks. MET    LTG: -  by DC (12 weeks)  1. Pt will be independent with final HEP for self-management of condition by DC. MET  2. Pt will improve score on QuickDASH to less than 25% impairment by DC. NOT MET  3. Pt will report a 80% improvement in symptoms by DC in order to allow return to PLOF. MET  4. Pt will improve R shoulder abduction and flexion to at least 140 deg in order to be able to reach into cabinet to get cup or plate by DC. MET  5. Pt will improve B shoulder flexion strength to at least 4+/5 in order to be able to lift granddaughter by DC.  PARTIALLY MET      Plan  Therapy options: will not be seen for skilled therapy services  Treatment plan discussed with: patient  Plan details: Discharge to [facility]      Progress toward previous goals: Partially Met        Recommendations: Discharge  Certification Period: 5/6/2024  Prognosis to achieve goals: fair    PT Signature: Conor Sierra PT  [place] PT license #: [id number]    Based upon review of the patient's progress and continued therapy plan, it is my medical opinion that Orly Rivas should continue physical therapy treatment at [facility]  [address] [place], [address] [address] [phone].    Signature: __________________________________  Evelin Trevino APRN  Please sign and return via fax to [facility] - Fax #: [phone]. Thank you, [facility].    Timed:         Manual Therapy:         mins  49260;     Therapeutic Exercise:    10     mins  81236;     Neuromuscular Dayo:        mins  31425;    Therapeutic Activity:     15     mins  82802;     Gait Training:           mins  33830;     Ultrasound:          mins  23988;    Ionto                                   mins   56904  Self Care                       20     mins   72288      Un-Timed:  Electrical Stimulation:         mins  95681 ( );  Dry Needling          mins self-pay  Traction          mins 42904  Low Eval          Mins   12517  Mod Eval          Mins  38585  High Eval                            Mins  29352  Re-Eval                               mins  80357  Group Therapy           ____ mins 41609      Timed Treatment:   45   mins   Total Treatment:     45   mins

## 2024-03-06 ENCOUNTER — OFFICE VISIT (OUTPATIENT)
Dept: FAMILY MEDICINE CLINIC | Facility: CLINIC | Age: 74
End: 2024-03-06
Payer: MEDICARE

## 2024-03-06 VITALS
TEMPERATURE: 98.2 F | DIASTOLIC BLOOD PRESSURE: 68 MMHG | SYSTOLIC BLOOD PRESSURE: 100 MMHG | WEIGHT: 104.8 LBS | OXYGEN SATURATION: 99 % | HEART RATE: 78 BPM | BODY MASS INDEX: 19.29 KG/M2 | HEIGHT: 62 IN

## 2024-03-06 DIAGNOSIS — M81.0 OSTEOPOROSIS, POST-MENOPAUSAL: ICD-10-CM

## 2024-03-06 DIAGNOSIS — M79.631 PAIN IN BOTH FOREARMS: ICD-10-CM

## 2024-03-06 DIAGNOSIS — M25.512 ACUTE PAIN OF BOTH SHOULDERS: ICD-10-CM

## 2024-03-06 DIAGNOSIS — M25.511 ACUTE PAIN OF BOTH SHOULDERS: ICD-10-CM

## 2024-03-06 DIAGNOSIS — G43.E19 INTRACTABLE CHRONIC MIGRAINE WITH AURA AND WITHOUT STATUS MIGRAINOSUS: ICD-10-CM

## 2024-03-06 DIAGNOSIS — G43.009 ATYPICAL MIGRAINE: Primary | ICD-10-CM

## 2024-03-06 DIAGNOSIS — E78.2 MIXED HYPERLIPIDEMIA: ICD-10-CM

## 2024-03-06 DIAGNOSIS — M79.632 PAIN IN BOTH FOREARMS: ICD-10-CM

## 2024-03-06 PROCEDURE — 99213 OFFICE O/P EST LOW 20 MIN: CPT | Performed by: NURSE PRACTITIONER

## 2024-03-06 RX ORDER — BUTALBITAL, ACETAMINOPHEN AND CAFFEINE 300; 40; 50 MG/1; MG/1; MG/1
1 CAPSULE ORAL EVERY 4 HOURS PRN
Qty: 50 CAPSULE | Refills: 0 | Status: CANCELLED | OUTPATIENT
Start: 2024-03-06 | End: 2024-04-05

## 2024-03-06 RX ORDER — BUTALBITAL, ACETAMINOPHEN, CAFFEINE AND CODEINE PHOSPHATE 50; 325; 40; 30 MG/1; MG/1; MG/1; MG/1
1 CAPSULE ORAL EVERY 4 HOURS PRN
Qty: 60 CAPSULE | Refills: 0 | Status: SHIPPED | OUTPATIENT
Start: 2024-03-06

## 2024-03-06 RX ORDER — BUTALBITAL, ACETAMINOPHEN, CAFFEINE AND CODEINE PHOSPHATE 300; 50; 40; 30 MG/1; MG/1; MG/1; MG/1
CAPSULE ORAL
Qty: 84 CAPSULE | Status: CANCELLED | OUTPATIENT
Start: 2024-03-06

## 2024-03-06 NOTE — PROGRESS NOTES
Chief Complaint  Med Refill (Need migraine medication refilled )    Subjective        HPI   Orly presents to [facility] as a 73-year-old female for follow-up and medication management for migraines, osteoporosis and continued pain in her shoulders and forearms that have been improving over the past couple of months. To refill medications, review labs overall doing well     History of atypical migraines.  Onset was several years ago.  She is currently taking Fioricet with codeine  These do manage her symptoms well.  She denies any medication side effects.  She takes medicine as needed 1-3 times per week this has been slowly decreasing over the past couple of months as she has been doing physical therapy.  She is down to having migraines 1-2 times per week with no aura.  She denies any nausea or vomiting.  She tends to have some slight increase in frequency during season changes and with allergies.  No changes in intensity.    Osteoporosis-she did well on Prolia injections in the past.  Her insurance now declining coverage.  She is not able to tolerate biphosphonate's because she cannot swallow pills she has to crush them.  She is willing to try Reclast infusion        She does have some continued pain in her shoulders bilaterally and forearms.  She did complete physical therapy and doing her exercises as directed.  She has noticed some improvements.  She does continue to work on her exercises at home she does take Flexeril only occasionally to help her rest.  This medication works well and she denies any side effects     She has no other acute complaints of in office today    The following portions of the patient's history were reviewed and updated as appropriate: allergies, current medications, past family history, past medical history, past social history, past surgical history, and problem list         Review of Systems   Constitutional:  Negative for chills, fatigue and fever.  "  Eyes:  Negative for visual disturbance.   Respiratory:  Negative for cough, shortness of breath and wheezing.    Cardiovascular:  Negative for chest pain, palpitations and leg swelling.   Neurological:  Negative for dizziness.   Psychiatric/Behavioral:  Negative for self-injury, sleep disturbance and suicidal ideas. The patient is not nervous/anxious.         Objective   Vital Signs:   Vitals:    03/06/24 1549   BP: 100/68   BP Location: Left arm   Patient Position: Sitting   Cuff Size: Adult   Pulse: 78   Temp: 98.2 °F (36.8 °C)   TempSrc: Oral   SpO2: 99%   Weight: 47.5 kg (104 lb 12.8 oz)   Height: 157 cm (61.81\")   PainSc: 0-No pain            10/27/2023    10:15 AM   PHQ-2/PHQ-9 Depression Screening   Little Interest or Pleasure in Doing Things 0-->not at all   Feeling Down, Depressed or Hopeless 0-->not at all   PHQ-9: Brief Depression Severity Measure Score 0       BMI is within normal parameters. No other follow-up for BMI required.        Physical Exam  Vitals reviewed.   Constitutional:       General: She is not in acute distress.  Eyes:      Conjunctiva/sclera: Conjunctivae normal.   Neck:      Thyroid: No thyromegaly.      Vascular: No carotid bruit.   Cardiovascular:      Rate and Rhythm: Normal rate and regular rhythm.      Heart sounds: Normal heart sounds. No murmur heard.  Pulmonary:      Effort: Pulmonary effort is normal. No respiratory distress.      Breath sounds: Normal breath sounds. No stridor. No wheezing, rhonchi or rales.   Chest:      Chest wall: No tenderness.   Lymphadenopathy:      Cervical: No cervical adenopathy.   Neurological:      Mental Status: She is alert and oriented to person, place, and time.   Psychiatric:         Attention and Perception: Attention normal.         Mood and Affect: Mood normal.          Result Review :     The following data was reviewed by: CHANTEL Peñaloza on 03/06/2024:  Vitamin D,25-Hydroxy (12/06/2023 10:23)  T4, Free (12/06/2023 10:23)  TSH " (12/06/2023 10:23)  Lipid Panel (12/06/2023 10:23)  CBC & Differential (12/06/2023 10:23)  Comprehensive Metabolic Panel (12/06/2023 10:23)       No changes on labs-  cholesterol still just slightly elevated-  Will continue to montior  No need for any changes to medication at this time  Assessment and Plan    Assessment & Plan  Atypical migraine  Continue Fioricet working well  Follow-up every 3 months    Osteoporosis, post-menopausal  Reclast ordered again  Acute pain of both shoulders  Continue home exercises completed physical therapy  Mixed hyperlipidemia  Continue work on lower cholesterol diet and exercise    Pain in both forearms  Improved with physical  Follow-up with any changes             Follow Up   Return in about 3 months (around 6/6/2024) for Follow up/ Medication Check.  Patient was given instructions and counseling regarding her condition or for health maintenance advice. Please see specific information pulled into the AVS if appropriate.

## 2024-03-07 RX ORDER — SODIUM CHLORIDE 9 MG/ML
20 INJECTION, SOLUTION INTRAVENOUS ONCE
OUTPATIENT
Start: 2024-03-11

## 2024-03-07 RX ORDER — ZOLEDRONIC ACID 5 MG/100ML
5 INJECTION, SOLUTION INTRAVENOUS ONCE
OUTPATIENT
Start: 2024-03-11

## 2024-06-17 ENCOUNTER — OFFICE VISIT (OUTPATIENT)
Dept: FAMILY MEDICINE CLINIC | Facility: CLINIC | Age: 74
End: 2024-06-17
Payer: MEDICARE

## 2024-06-17 VITALS
HEART RATE: 65 BPM | SYSTOLIC BLOOD PRESSURE: 98 MMHG | WEIGHT: 104 LBS | RESPIRATION RATE: 15 BRPM | OXYGEN SATURATION: 98 % | TEMPERATURE: 98.3 F | DIASTOLIC BLOOD PRESSURE: 52 MMHG | BODY MASS INDEX: 19.14 KG/M2 | HEIGHT: 62 IN

## 2024-06-17 DIAGNOSIS — G43.009 ATYPICAL MIGRAINE: Primary | ICD-10-CM

## 2024-06-17 DIAGNOSIS — F51.01 PRIMARY INSOMNIA: ICD-10-CM

## 2024-06-17 DIAGNOSIS — M25.512 ACUTE PAIN OF BOTH SHOULDERS: ICD-10-CM

## 2024-06-17 DIAGNOSIS — M25.511 ACUTE PAIN OF BOTH SHOULDERS: ICD-10-CM

## 2024-06-17 PROCEDURE — 99214 OFFICE O/P EST MOD 30 MIN: CPT | Performed by: NURSE PRACTITIONER

## 2024-06-17 PROCEDURE — 1126F AMNT PAIN NOTED NONE PRSNT: CPT | Performed by: NURSE PRACTITIONER

## 2024-06-17 RX ORDER — BUTALBITAL, ACETAMINOPHEN, CAFFEINE AND CODEINE PHOSPHATE 50; 325; 40; 30 MG/1; MG/1; MG/1; MG/1
1 CAPSULE ORAL EVERY 4 HOURS PRN
Qty: 60 CAPSULE | Refills: 0 | Status: SHIPPED | OUTPATIENT
Start: 2024-06-17

## 2024-06-17 NOTE — PROGRESS NOTES
Chief Complaint  Med Refill    Subjective        HPI   Orly presents to [facility] as a 73-year-old female for follow-up and medication management for migraines, ongoing shoulder/forearm pain.  To refill medications, review/order labs.  Overall doing well    Atypical migraines-onset was several years ago.  She is currently taking Fioricet with codeine.  She does feel this manages her symptoms very well.  She has tolerated this medication with no side effects.  She takes this medication as needed usually 1-3 times per week.  She has been slowly decreasing her dosage over the past couple of months.  She did do physical therapy that did help with her symptoms.  She is down to having migraines 1-2 times per week with no aura.  She denies any nausea or vomiting.  She does have some sensitivity to light and sound during acute migraine.  She does notice an increase in frequency during season changes.    She has been having some ongoing shoulder and forearm pain.  She has been using her topical cream and doing her exercises.  She does feel that this has improved since going to physical therapy.  She does plan to continue.  Declines any need for any additional medication at this time    She has had some increased problems with staying asleep.  States that she does take melatonin and it does help her fall asleep but she has been waking up several times per night to go to the bathroom and then not able to get back to sleep.  She would like to try increasing her melatonin prior to any other sleep aid/medication.  She is trying to adjust her sleep schedule  She does have a lot increased anxiety/stress with family recently  Declines any need for anxiety medication at this time  She will follow-up if her symptoms do not improve with increasing her dose of melatonin      She has no other acute complaints in office today    The following portions of the patient's history were reviewed and updated as  "appropriate: allergies, current medications, past family history, past medical history, past social history, past surgical history, and problem list      Review of Systems   Constitutional:  Negative for chills, fatigue and fever.   Eyes:  Negative for visual disturbance.   Respiratory:  Negative for cough, shortness of breath, wheezing and stridor.    Cardiovascular:  Negative for chest pain, palpitations and leg swelling.   Gastrointestinal:  Negative for abdominal pain, diarrhea, nausea and vomiting.   Musculoskeletal:  Positive for arthralgias.   Neurological:  Negative for dizziness.   Psychiatric/Behavioral:  Positive for sleep disturbance. Negative for self-injury and suicidal ideas. The patient is nervous/anxious.         Objective   Vital Signs:   Vitals:    06/17/24 1456   BP: 98/52   BP Location: Left arm   Patient Position: Sitting   Cuff Size: Small Adult   Pulse: 65   Resp: 15   Temp: 98.3 °F (36.8 °C)   TempSrc: Oral   SpO2: 98%   Weight: 47.2 kg (104 lb)   Height: 157 cm (61.81\")   PainSc: 0-No pain            10/27/2023    10:15 AM   PHQ-2/PHQ-9 Depression Screening   Little Interest or Pleasure in Doing Things 0-->not at all   Feeling Down, Depressed or Hopeless 0-->not at all   PHQ-9: Brief Depression Severity Measure Score 0       BMI is within normal parameters. No other follow-up for BMI required.        Physical Exam  Vitals reviewed.   Constitutional:       General: She is not in acute distress.  Eyes:      Conjunctiva/sclera: Conjunctivae normal.   Neck:      Thyroid: No thyromegaly.      Vascular: No carotid bruit.   Cardiovascular:      Rate and Rhythm: Normal rate and regular rhythm.      Heart sounds: Normal heart sounds. No murmur heard.  Pulmonary:      Effort: Pulmonary effort is normal. No respiratory distress.      Breath sounds: Normal breath sounds. No stridor. No wheezing, rhonchi or rales.   Chest:      Chest wall: No tenderness.   Lymphadenopathy:      Cervical: No cervical " adenopathy.   Neurological:      Mental Status: She is alert and oriented to person, place, and time.   Psychiatric:         Attention and Perception: Attention normal.         Mood and Affect: Mood normal.          Result Review :     The following data was reviewed by: CHANTEL Peñaloza on 06/17/2024:  Calcium (03/08/2024 02:01)  Phosphorus (03/08/2024 02:01)  Magnesium (03/08/2024 02:01)  Comprehensive metabolic panel (03/08/2024 02:01)  CBC and Differential (03/08/2024 02:01)  Creatinine Clearance - Urine, Clean Catch (03/07/2024 02:01)       Labs ordered and pending  Assessment and Plan    Assessment & Plan  Atypical migraine  Continue Fioricet working well  Reporting increases or changes  Keep headache diary  Acute pain of both shoulders  Continue exercises prescribed physical therapy  Continue topical ointment as needed  Follow-up with any worsening or no improvements  Primary insomnia  Sleep hygiene discussed  Increase over-the-counter melatonin follow-up if no improvements       New Medications Ordered This Visit   Medications    butalbital-acetaminophen-caffeine-codeine (FIORICET WITH CODEINE) -85-30 MG per capsule     Sig: Take 1 capsule by mouth Every 4 (Four) Hours As Needed for Headache.     Dispense:  60 capsule     Refill:  0          Follow Up   Return in about 3 months (around 9/17/2024) for Follow up/ Medication Check.  Patient was given instructions and counseling regarding her condition or for health maintenance advice. Please see specific information pulled into the AVS if appropriate.

## 2024-10-08 ENCOUNTER — OFFICE VISIT (OUTPATIENT)
Dept: FAMILY MEDICINE CLINIC | Facility: CLINIC | Age: 74
End: 2024-10-08
Payer: MEDICARE

## 2024-10-08 VITALS
WEIGHT: 102.3 LBS | SYSTOLIC BLOOD PRESSURE: 100 MMHG | HEART RATE: 83 BPM | DIASTOLIC BLOOD PRESSURE: 58 MMHG | BODY MASS INDEX: 18.82 KG/M2 | HEIGHT: 62 IN | TEMPERATURE: 96.6 F | OXYGEN SATURATION: 98 %

## 2024-10-08 DIAGNOSIS — M81.0 OSTEOPOROSIS, POST-MENOPAUSAL: ICD-10-CM

## 2024-10-08 DIAGNOSIS — G43.009 ATYPICAL MIGRAINE: Primary | ICD-10-CM

## 2024-10-08 DIAGNOSIS — F51.01 PRIMARY INSOMNIA: ICD-10-CM

## 2024-10-08 PROCEDURE — 1126F AMNT PAIN NOTED NONE PRSNT: CPT | Performed by: NURSE PRACTITIONER

## 2024-10-08 PROCEDURE — 99214 OFFICE O/P EST MOD 30 MIN: CPT | Performed by: NURSE PRACTITIONER

## 2024-10-08 RX ORDER — RIZATRIPTAN BENZOATE 10 MG/1
10 TABLET, ORALLY DISINTEGRATING ORAL ONCE AS NEEDED
Qty: 14 TABLET | Refills: 5 | Status: SHIPPED | OUTPATIENT
Start: 2024-10-08

## 2024-10-08 RX ORDER — BUTALBITAL, ACETAMINOPHEN, CAFFEINE AND CODEINE PHOSPHATE 50; 325; 40; 30 MG/1; MG/1; MG/1; MG/1
1 CAPSULE ORAL EVERY 4 HOURS PRN
Qty: 60 CAPSULE | Refills: 0 | Status: SHIPPED | OUTPATIENT
Start: 2024-10-08

## 2024-10-08 NOTE — PROGRESS NOTES
Chief Complaint  Headache and Insomnia    Subjective        HPI   Orly presents to [facility] as a 73-year-old female for follow-up and medication refill.  Overall doing well      Atypical migraines-onset was several years ago.  She is currently taking Fioricet with codeine.  She does feel this manages her symptoms very well.  She has tolerated this medication with no side effects.  She takes this medication as needed usually 1-3 times per week.  She has been slowly decreasing her dosage over the past couple of months.  She did do physical therapy that did help with her symptoms.  She is down to having migraines 1-2 times per week with no aura.  She denies any nausea or vomiting.  She does have some sensitivity to light and sound during acute migraine.  She does notice an increase in frequency during season changes.        She has had some continued increased problems with staying asleep.  States that she does take melatonin and it does help her fall asleep but she has been waking up several times per night to go to the bathroom and then not able to get back to sleep.  She would like to try increasing her melatonin prior to any other sleep aid/medication.  She is trying to adjust her sleep schedule  She does have a lot increased anxiety/stress with family recently  Takes care of her grand daughter and has to adjust schedule for drop off and .   Declines any need for medication at this time  She will follow-up if her symptoms do not improve with increasing her dose of melatonin        She has no other acute complaints in office today      The following portions of the patient's history were reviewed and updated as appropriate: allergies, current medications, past family history, past medical history, past social history, past surgical history, and problem list    Review of Systems   Constitutional:  Negative for chills, fatigue and fever.   Eyes:  Negative for visual  "disturbance.   Respiratory:  Negative for cough, shortness of breath and wheezing.    Cardiovascular:  Negative for chest pain and leg swelling.   Gastrointestinal:  Negative for abdominal pain, diarrhea, nausea and vomiting.   Neurological:  Negative for dizziness.   Psychiatric/Behavioral:  Positive for sleep disturbance. Negative for self-injury and suicidal ideas. The patient is not nervous/anxious.         Objective   Vital Signs:   Vitals:    10/08/24 1500   BP: 100/58   Pulse: 83   Temp: 96.6 °F (35.9 °C)   SpO2: 98%   Weight: 46.4 kg (102 lb 4.8 oz)   Height: 157 cm (61.81\")   PainSc: 0-No pain            10/8/2024     3:03 PM   PHQ-2/PHQ-9 Depression Screening   Little Interest or Pleasure in Doing Things 0-->not at all   Feeling Down, Depressed or Hopeless 0-->not at all   PHQ-9: Brief Depression Severity Measure Score 0       BMI is within normal parameters. No other follow-up for BMI required.        Physical Exam  Vitals reviewed.   Constitutional:       General: She is not in acute distress.  Eyes:      Conjunctiva/sclera: Conjunctivae normal.   Neck:      Thyroid: No thyromegaly.      Vascular: No carotid bruit.   Cardiovascular:      Rate and Rhythm: Normal rate and regular rhythm.      Heart sounds: Normal heart sounds. No murmur heard.  Pulmonary:      Effort: Pulmonary effort is normal. No respiratory distress.      Breath sounds: Normal breath sounds. No stridor. No wheezing, rhonchi or rales.   Chest:      Chest wall: No tenderness.   Lymphadenopathy:      Cervical: No cervical adenopathy.   Neurological:      Mental Status: She is alert and oriented to person, place, and time.   Psychiatric:         Attention and Perception: Attention normal.         Mood and Affect: Mood normal.          Result Review :     The following data was reviewed by: CHANTEL Peñaloza on 10/08/2024:      Calcium (03/08/2024 02:01)  Phosphorus (03/08/2024 02:01)  Magnesium (03/08/2024 02:01)  Comprehensive " metabolic panel (03/08/2024 02:01)  CBC and Differential (03/08/2024 02:01)  Creatinine Clearance - Urine, Clean Catch (03/07/2024 02:01)  Pending-  ordered         Assessment and Plan    Assessment & Plan  Atypical migraine  Headaches are stable.    Plan:  Continue same medication/s without change.     Discussed medication dosage, use, side effects, and goals of treatment in detail.    Discussed monitoring symptoms and use of quick-relief medications and maintenance medication.    General Treatment Goals:   symptom prevention  minimize work absence  minimizing limitation in activity  prevention of exacerbations  decrease use of ER/inpatient care  minimization of adverse effects of treatment    Followup in 6 months      Primary insomnia  Sleep hygiene dicussed  Continue melatonin    Osteoporosis, post-menopausal  Due for dexa scan-  plans to schedule with GYN       New Medications Ordered This Visit   Medications    butalbital-acetaminophen-caffeine-codeine (FIORICET WITH CODEINE) -39-30 MG per capsule     Sig: Take 1 capsule by mouth Every 4 (Four) Hours As Needed for Headache.     Dispense:  60 capsule     Refill:  0    rizatriptan MLT (Maxalt-MLT) 10 MG disintegrating tablet     Sig: Place 1 tablet on the tongue 1 (One) Time As Needed for Migraine for up to 28 doses. May repeat in 2 hours if needed     Dispense:  14 tablet     Refill:  5          Follow Up   Return in about 3 months (around 1/8/2025) for Follow up/ Medication Check, Annual physical.  Patient was given instructions and counseling regarding her condition or for health maintenance advice. Please see specific information pulled into the AVS if appropriate.

## 2025-01-10 ENCOUNTER — OFFICE VISIT (OUTPATIENT)
Dept: FAMILY MEDICINE CLINIC | Facility: CLINIC | Age: 75
End: 2025-01-10
Payer: MEDICARE

## 2025-01-10 VITALS
BODY MASS INDEX: 18.44 KG/M2 | WEIGHT: 100.2 LBS | TEMPERATURE: 95.3 F | HEART RATE: 61 BPM | OXYGEN SATURATION: 98 % | HEIGHT: 62 IN | SYSTOLIC BLOOD PRESSURE: 100 MMHG | DIASTOLIC BLOOD PRESSURE: 62 MMHG

## 2025-01-10 DIAGNOSIS — G43.009 ATYPICAL MIGRAINE: Primary | ICD-10-CM

## 2025-01-10 DIAGNOSIS — E55.9 VITAMIN D DEFICIENCY: ICD-10-CM

## 2025-01-10 DIAGNOSIS — E78.2 MIXED HYPERLIPIDEMIA: ICD-10-CM

## 2025-01-10 DIAGNOSIS — F51.01 PRIMARY INSOMNIA: ICD-10-CM

## 2025-01-10 PROCEDURE — 1126F AMNT PAIN NOTED NONE PRSNT: CPT | Performed by: NURSE PRACTITIONER

## 2025-01-10 PROCEDURE — 1159F MED LIST DOCD IN RCRD: CPT | Performed by: NURSE PRACTITIONER

## 2025-01-10 PROCEDURE — 1160F RVW MEDS BY RX/DR IN RCRD: CPT | Performed by: NURSE PRACTITIONER

## 2025-01-10 PROCEDURE — 99214 OFFICE O/P EST MOD 30 MIN: CPT | Performed by: NURSE PRACTITIONER

## 2025-01-10 RX ORDER — BUTALBITAL, ACETAMINOPHEN, CAFFEINE AND CODEINE PHOSPHATE 50; 325; 40; 30 MG/1; MG/1; MG/1; MG/1
1 CAPSULE ORAL EVERY 4 HOURS PRN
Qty: 60 CAPSULE | Refills: 0 | Status: CANCELLED | OUTPATIENT
Start: 2025-01-10

## 2025-01-10 RX ORDER — BUTALBITAL, ACETAMINOPHEN, CAFFEINE AND CODEINE PHOSPHATE 50; 325; 40; 30 MG/1; MG/1; MG/1; MG/1
1 CAPSULE ORAL EVERY 4 HOURS PRN
Qty: 60 CAPSULE | Refills: 0 | Status: SHIPPED | OUTPATIENT
Start: 2025-01-10

## 2025-01-10 NOTE — PROGRESS NOTES
Chief Complaint  Med Refill and Migraine    Subjective        HPI   Orly presents to [facility]  as a 73-year-old female for follow-up and medication refill.  Overall doing well        Atypical migraines-onset was several years ago.  She is currently taking Fioricet with codeine.  She does feel this manages her symptoms very well.  She has tolerated this medication with no side effects.  She takes this medication as needed usually 1-3 times per week.  She has been slowly decreasing her dosage over the past couple of months.  She did do physical therapy that did help with her symptoms.  She is down to having migraines 1-2 times per week with no aura.  She denies any nausea or vomiting.  She does have some sensitivity to light and sound during acute migraine.  She does notice an increase in frequency during season changes.  No changes since last appointment-        She has had some continued increased problems with staying asleep.  States that she does take melatonin and it does help her fall asleep but she has been waking up several times per night to go to the bathroom and then not able to get back to sleep.        She does have a lot increased anxiety/stress with family recently  Takes care of her grand daughter and has to adjust schedule for drop off and .   Declines any need for medication at this time  She will follow-up if her symptoms do not improve with increasing her dose of melatonin    Increase in melatonin did help-  since last follow up=  plans to continue that at this time      Decreased appetite- has lost 4 lbs in last 6 months  Working on small frequent meals-  stays very active  Started probiotic- to minimize GI upset and with cold season        She has no other acute complaints in office today    Agreeable to routine labs-  ordered today    The following portions of the patient's history were reviewed and updated as appropriate: allergies, current  "medications, past family history, past medical history, past social history, past surgical history, and problem list         Review of Systems   Constitutional:  Negative for chills, fatigue and fever.   Eyes:  Negative for visual disturbance.   Respiratory:  Negative for cough, shortness of breath and wheezing.    Cardiovascular:  Negative for chest pain and leg swelling.   Neurological:  Negative for dizziness.   Psychiatric/Behavioral:  Negative for self-injury, sleep disturbance and suicidal ideas. The patient is not nervous/anxious.         Objective   Vital Signs:   Vitals:    01/10/25 1033   BP: 100/62   Pulse: 61   Temp: 95.3 °F (35.2 °C)   SpO2: 98%   Weight: 45.5 kg (100 lb 3.2 oz)   Height: 157 cm (61.81\")   PainSc: 0-No pain            10/8/2024     3:03 PM   PHQ-2/PHQ-9 Depression Screening   Retired Little Interest or Pleasure in Doing Things 0-->not at all   Retired Feeling Down, Depressed or Hopeless 0-->not at all   Retired PHQ-9: Brief Depression Severity Measure Score 0               Physical Exam  Vitals reviewed.   Constitutional:       General: She is not in acute distress.  Eyes:      Conjunctiva/sclera: Conjunctivae normal.   Neck:      Thyroid: No thyromegaly.      Vascular: No carotid bruit.   Cardiovascular:      Rate and Rhythm: Normal rate and regular rhythm.      Heart sounds: Normal heart sounds.   Pulmonary:      Effort: Pulmonary effort is normal. No respiratory distress.      Breath sounds: Normal breath sounds. No stridor. No wheezing, rhonchi or rales.   Chest:      Chest wall: No tenderness.   Neurological:      Mental Status: She is alert and oriented to person, place, and time.   Psychiatric:         Attention and Perception: Attention normal.         Mood and Affect: Mood normal.          Result Review :     The following data was reviewed by: CHANTEL Peñaloza on 01/10/2025:      Vitamin D,25-Hydroxy (12/06/2023 10:23)  T4, Free (12/06/2023 10:23)  TSH (12/06/2023 " 10:23)  Lipid Panel (12/06/2023 10:23)  CBC & Differential (12/06/2023 10:23)  Comprehensive Metabolic Panel (12/06/2023 10:23)     Assessment and Plan       Atypical migraine  Headaches are stable.    Plan:  Continue same medication/s without change.     Discussed medication dosage, use, side effects, and goals of treatment in detail.    Discussed monitoring symptoms and use of quick-relief medications and maintenance medication.    General Treatment Goals:   symptom prevention  minimize work absence  minimizing limitation in activity  prevention of exacerbations  decrease use of ER/inpatient care  minimization of adverse effects of treatment    Followup in 3 months                Orders:    butalbital-acetaminophen-caffeine-codeine (FIORICET WITH CODEINE) -45-30 MG per capsule; Take 1 capsule by mouth Every 4 (Four) Hours As Needed for Headache.    Mixed hyperlipidemia   Lipid abnormalities are stable    Plan:  Continue same medication/s without change.      Discussed medication dosage, use, side effects, and goals of treatment in detail.    Counseled patient on lifestyle modifications to help control hyperlipidemia.     Patient Treatment Goals:   LDL goal is under 100    Followup in 6 months.    Orders:    Vitamin D,25-Hydroxy    T4, Free    TSH    Lipid Panel    CBC & Differential    Comprehensive Metabolic Panel    Vitamin D deficiency  Recheck Vitamin D with labs  Orders:    Vitamin D,25-Hydroxy    Primary insomnia  Continue to work on sleep hygiene  Melatonin increased dose has helped  Checking labs  Orders:    Vitamin D,25-Hydroxy    T4, Free    TSH    Lipid Panel    CBC & Differential    Comprehensive Metabolic Panel          Follow Up   Return in about 3 months (around 4/10/2025) for Follow up/ Medication Check.  Patient was given instructions and counseling regarding her condition or for health maintenance advice. Please see specific information pulled into the AVS if appropriate.

## 2025-01-11 LAB
25(OH)D3+25(OH)D2 SERPL-MCNC: 77.3 NG/ML (ref 30–100)
ALBUMIN SERPL-MCNC: 4.4 G/DL (ref 3.5–5.2)
ALBUMIN/GLOB SERPL: 1.8 G/DL
ALP SERPL-CCNC: 70 U/L (ref 39–117)
ALT SERPL-CCNC: 18 U/L (ref 1–33)
AST SERPL-CCNC: 24 U/L (ref 1–32)
BASOPHILS # BLD AUTO: 0.1 10*3/MM3 (ref 0–0.2)
BASOPHILS NFR BLD AUTO: 2 % (ref 0–1.5)
BILIRUB SERPL-MCNC: 0.3 MG/DL (ref 0–1.2)
BUN SERPL-MCNC: 14 MG/DL (ref 8–23)
BUN/CREAT SERPL: 17.9 (ref 7–25)
CALCIUM SERPL-MCNC: 10 MG/DL (ref 8.6–10.5)
CHLORIDE SERPL-SCNC: 104 MMOL/L (ref 98–107)
CHOLEST SERPL-MCNC: 208 MG/DL (ref 0–200)
CO2 SERPL-SCNC: 30.8 MMOL/L (ref 22–29)
CREAT SERPL-MCNC: 0.78 MG/DL (ref 0.57–1)
EGFRCR SERPLBLD CKD-EPI 2021: 80.3 ML/MIN/1.73
EOSINOPHIL # BLD AUTO: 0.17 10*3/MM3 (ref 0–0.4)
EOSINOPHIL NFR BLD AUTO: 3.4 % (ref 0.3–6.2)
ERYTHROCYTE [DISTWIDTH] IN BLOOD BY AUTOMATED COUNT: 12.2 % (ref 12.3–15.4)
GLOBULIN SER CALC-MCNC: 2.5 GM/DL
GLUCOSE SERPL-MCNC: 102 MG/DL (ref 65–99)
HCT VFR BLD AUTO: 41.2 % (ref 34–46.6)
HDLC SERPL-MCNC: 64 MG/DL (ref 40–60)
HGB BLD-MCNC: 13.9 G/DL (ref 12–15.9)
IMM GRANULOCYTES # BLD AUTO: 0.01 10*3/MM3 (ref 0–0.05)
IMM GRANULOCYTES NFR BLD AUTO: 0.2 % (ref 0–0.5)
LDLC SERPL CALC-MCNC: 128 MG/DL (ref 0–100)
LYMPHOCYTES # BLD AUTO: 2.34 10*3/MM3 (ref 0.7–3.1)
LYMPHOCYTES NFR BLD AUTO: 47.4 % (ref 19.6–45.3)
MCH RBC QN AUTO: 30.3 PG (ref 26.6–33)
MCHC RBC AUTO-ENTMCNC: 33.7 G/DL (ref 31.5–35.7)
MCV RBC AUTO: 89.8 FL (ref 79–97)
MONOCYTES # BLD AUTO: 0.48 10*3/MM3 (ref 0.1–0.9)
MONOCYTES NFR BLD AUTO: 9.7 % (ref 5–12)
NEUTROPHILS # BLD AUTO: 1.84 10*3/MM3 (ref 1.7–7)
NEUTROPHILS NFR BLD AUTO: 37.3 % (ref 42.7–76)
NRBC BLD AUTO-RTO: 0 /100 WBC (ref 0–0.2)
PLATELET # BLD AUTO: 308 10*3/MM3 (ref 140–450)
POTASSIUM SERPL-SCNC: 4.3 MMOL/L (ref 3.5–5.2)
PROT SERPL-MCNC: 6.9 G/DL (ref 6–8.5)
RBC # BLD AUTO: 4.59 10*6/MM3 (ref 3.77–5.28)
SODIUM SERPL-SCNC: 142 MMOL/L (ref 136–145)
T4 FREE SERPL-MCNC: 0.97 NG/DL (ref 0.92–1.68)
TRIGL SERPL-MCNC: 92 MG/DL (ref 0–150)
TSH SERPL DL<=0.005 MIU/L-ACNC: 4.68 UIU/ML (ref 0.27–4.2)
VLDLC SERPL CALC-MCNC: 16 MG/DL (ref 5–40)
WBC # BLD AUTO: 4.94 10*3/MM3 (ref 3.4–10.8)

## 2025-01-13 NOTE — PROGRESS NOTES
Good morning Orly  Your lab results are back    Overall your labs are stable from last check  Thyroid is still just a little bit elevated but T4 is normal  This is something we will just watch with labs    Cholesterol still just a little bit elevated but no significant changes  Just continue to work on healthy diet and exercise    No other major changes on your labs  Let me know if you have any questions  Evelin

## 2025-04-14 ENCOUNTER — OFFICE VISIT (OUTPATIENT)
Dept: FAMILY MEDICINE CLINIC | Facility: CLINIC | Age: 75
End: 2025-04-14
Payer: MEDICARE

## 2025-04-14 VITALS
TEMPERATURE: 95.1 F | SYSTOLIC BLOOD PRESSURE: 122 MMHG | OXYGEN SATURATION: 99 % | WEIGHT: 104 LBS | DIASTOLIC BLOOD PRESSURE: 78 MMHG | HEIGHT: 62 IN | BODY MASS INDEX: 19.14 KG/M2 | HEART RATE: 59 BPM

## 2025-04-14 DIAGNOSIS — G43.009 ATYPICAL MIGRAINE: Primary | ICD-10-CM

## 2025-04-14 DIAGNOSIS — E78.2 MIXED HYPERLIPIDEMIA: ICD-10-CM

## 2025-04-14 DIAGNOSIS — M67.40 GANGLION CYST: ICD-10-CM

## 2025-04-14 DIAGNOSIS — Z79.899 HIGH RISK MEDICATION USE: ICD-10-CM

## 2025-04-14 PROCEDURE — 1126F AMNT PAIN NOTED NONE PRSNT: CPT | Performed by: NURSE PRACTITIONER

## 2025-04-14 PROCEDURE — G2211 COMPLEX E/M VISIT ADD ON: HCPCS | Performed by: NURSE PRACTITIONER

## 2025-04-14 PROCEDURE — 99214 OFFICE O/P EST MOD 30 MIN: CPT | Performed by: NURSE PRACTITIONER

## 2025-04-14 RX ORDER — RIZATRIPTAN BENZOATE 10 MG/1
10 TABLET, ORALLY DISINTEGRATING ORAL ONCE AS NEEDED
Qty: 72 TABLET | Refills: 3 | Status: SHIPPED | OUTPATIENT
Start: 2025-04-14

## 2025-04-14 RX ORDER — BUTALBITAL, ACETAMINOPHEN, CAFFEINE AND CODEINE PHOSPHATE 50; 325; 40; 30 MG/1; MG/1; MG/1; MG/1
1 CAPSULE ORAL EVERY 4 HOURS PRN
Qty: 60 CAPSULE | Refills: 0 | Status: SHIPPED | OUTPATIENT
Start: 2025-04-14

## 2025-04-14 NOTE — PROGRESS NOTES
Chief Complaint  Med Refill, Hyperlipidemia, Migraine, and Cyst    Subjective        HPI   Orly presents to [facility] as a 74 year old female for follow up on chronic conditions and to refill medications.  Overall doing well    Atypical migraines-onset was several years ago.  She is currently taking Fioricet with codeine.  She does feel this manages her symptoms very well.  She has tolerated this medication with no side effects.  She takes this medication as needed usually 1-3 times per week.  She has been slowly decreasing her dosage over the past couple of months.  She did do physical therapy that did help with her symptoms.  She is down to having migraines 1-2 times per week with no aura.  She denies any nausea or vomiting.  She does have some sensitivity to light and sound during acute migraine.  She does notice an increase in frequency during season changes.  No changes since last appointment-  medication working well  CSA updated annually today-  will get u tox with next labs        Hyperlipidemia=  continues to work on healthy diet and exercise  Last labs 1/2025-  reviewed    She does have a ganglion cyst on her left knee-has been there for at least the last 5 years.  No changes  Occasionally she gets some tingling when she does extra exercise  No increase in size  Declines referral to dermatology  Not causing any problems at this time        She has no other acute complaints in office today    The following portions of the patient's history were reviewed and updated as appropriate: allergies, current medications, past family history, past medical history, past social history, past surgical history, and problem list     Review of Systems   Constitutional:  Negative for chills, fatigue and fever.   Eyes:  Negative for visual disturbance.   Respiratory:  Negative for cough, shortness of breath and wheezing.    Cardiovascular:  Negative for chest pain and leg swelling.  "  Endocrine: Negative for polydipsia, polyphagia and polyuria.   Neurological:  Negative for dizziness.   Psychiatric/Behavioral:  Negative for self-injury, sleep disturbance and suicidal ideas. The patient is not nervous/anxious.         Objective   Vital Signs:   Vitals:    04/14/25 0913   BP: 122/78   Pulse: 59   Temp: 95.1 °F (35.1 °C)   SpO2: 99%   Weight: 47.2 kg (104 lb)   Height: 157 cm (61.81\")   PainSc: 0-No pain            4/14/2025     9:13 AM   PHQ-2/PHQ-9 Depression Screening   Little interest or pleasure in doing things Not at all   Feeling down, depressed, or hopeless Not at all   How difficult have these problems made it for you to do your work, take care of things at home, or get along with other people? Not difficult at all       BMI is within normal parameters. No other follow-up for BMI required.        Physical Exam  Vitals reviewed.   Constitutional:       General: She is not in acute distress.  Eyes:      Conjunctiva/sclera: Conjunctivae normal.   Neck:      Thyroid: No thyromegaly.      Vascular: No carotid bruit.   Cardiovascular:      Rate and Rhythm: Normal rate and regular rhythm.      Heart sounds: Normal heart sounds. No murmur heard.  Pulmonary:      Effort: Pulmonary effort is normal. No respiratory distress.      Breath sounds: Normal breath sounds. No stridor. No wheezing, rhonchi or rales.   Chest:      Chest wall: No tenderness.   Lymphadenopathy:      Cervical: No cervical adenopathy.   Skin:            Comments: Small less than 0.5 cm ganglion cyst to anterior knee   Neurological:      Mental Status: She is alert and oriented to person, place, and time.   Psychiatric:         Attention and Perception: Attention normal.         Mood and Affect: Mood normal.          Result Review :     The following data was reviewed by: CHANTEL Peñaloza on 04/14/2025:      Comprehensive Metabolic Panel (01/10/2025 10:50)  CBC & Differential (01/10/2025 10:50)  Lipid Panel (01/10/2025 " 10:50)  TSH (01/10/2025 10:50)  T4, Free (01/10/2025 10:50)  Vitamin D,25-Hydroxy (01/10/2025 10:50)  Overall your labs are stable from last check   Thyroid is still just a little bit elevated but T4 is normal   This is something we will just watch with labs     Cholesterol still just a little bit elevated but no significant changes   Just continue to work on healthy diet and exercise     No other major changes on your labs   Let me know if you have any questions            Assessment and Plan       Atypical migraine  Headaches are stable.    Plan:  Continue same medication/s without change.     Discussed medication dosage, use, side effects, and goals of treatment in detail.    Discussed monitoring symptoms and use of quick-relief medications and maintenance medication.    General Treatment Goals:   symptom prevention  minimize work absence  minimizing limitation in activity  prevention of exacerbations  decrease use of ER/inpatient care  minimization of adverse effects of treatment    Followup in 6 months                Orders:    butalbital-acetaminophen-caffeine-codeine (FIORICET WITH CODEINE) -70-30 MG per capsule; Take 1 capsule by mouth Every 4 (Four) Hours As Needed for Headache.    rizatriptan MLT (Maxalt-MLT) 10 MG disintegrating tablet; Place 1 tablet on the tongue 1 (One) Time As Needed for Migraine for up to 90 doses. May repeat in 2 hours if needed    ToxAssure Flex 23, Ur -    Mixed hyperlipidemia   Lipid abnormalities are stable    Plan:  Continue same medication/s without change.      Discussed medication dosage, use, side effects, and goals of treatment in detail.    Counseled patient on lifestyle modifications to help control hyperlipidemia.     Patient Treatment Goals:   LDL goal is under 100    Followup in 6 months.         Ganglion cyst  No changes-  no problems         High risk medication use  Updated CSA today  U tox with next labs  Orders:    ToxAssure Flex 23, Ur -          Follow Up    Return in about 3 months (around 7/14/2025) for Follow up/ Medication Check.  Patient was given instructions and counseling regarding her condition or for health maintenance advice. Please see specific information pulled into the AVS if appropriate.

## 2025-04-29 ENCOUNTER — TELEPHONE (OUTPATIENT)
Dept: FAMILY MEDICINE CLINIC | Facility: CLINIC | Age: 75
End: 2025-04-29
Payer: MEDICARE

## 2025-04-29 NOTE — TELEPHONE ENCOUNTER
Hub to relay  Left patient a vm regarding urine tox that was ordered at her last visit 4/17/2025. Patient is due to follow-up with provider around 7/14/2025 for medication renewal patient. Patient was advised to contact our office to schedule follow-up with provider and lab only to have urine tox completed prior to appt

## 2025-07-15 ENCOUNTER — TELEPHONE (OUTPATIENT)
Dept: FAMILY MEDICINE CLINIC | Facility: CLINIC | Age: 75
End: 2025-07-15
Payer: MEDICARE

## 2025-07-15 DIAGNOSIS — Z79.899 HIGH RISK MEDICATION USE: ICD-10-CM

## 2025-07-15 DIAGNOSIS — Z12.31 ENCOUNTER FOR SCREENING MAMMOGRAM FOR MALIGNANT NEOPLASM OF BREAST: Primary | ICD-10-CM

## 2025-07-15 DIAGNOSIS — G43.009 ATYPICAL MIGRAINE: ICD-10-CM

## 2025-07-15 DIAGNOSIS — Z78.0 POST-MENOPAUSAL: ICD-10-CM

## 2025-07-15 DIAGNOSIS — E78.2 MIXED HYPERLIPIDEMIA: ICD-10-CM

## 2025-07-15 DIAGNOSIS — M81.0 OSTEOPOROSIS, POST-MENOPAUSAL: ICD-10-CM

## 2025-07-15 DIAGNOSIS — F51.01 PRIMARY INSOMNIA: ICD-10-CM

## 2025-07-15 DIAGNOSIS — E55.9 VITAMIN D DEFICIENCY: ICD-10-CM

## 2025-07-15 NOTE — TELEPHONE ENCOUNTER
Caller: Orly Rivas    Relationship: Self    Best call back number: [phone]        What orders are you requesting (i.e. lab or imaging): BONE DENSITY AND MAMMO    In what timeframe would the patient need to come in: [facility]    PLEASE CALL AND ADVISE WHEN ORDER IS IN.    Telemetry box # [id number] placed on patient, confirmed. HR 61 sinus

## 2025-07-15 NOTE — TELEPHONE ENCOUNTER
Caller: Orly Rivas    Relationship: Self    Best call back number:[phone]     What orders are you requesting (i.e. lab or imaging): STANDARD LABS AND URINE TEST    In what timeframe would the patient need to come in: ASAP    Where will you receive your lab/imaging services: OFFICE    Additional notes: PATIENT WOULD LIKE TO HAVE ALL OF THIS DONE BEFORE HER NEXT APPOINTMENT.    PLEASE CALL TO SCHEDULE LAB APPOINTMENT ONCE ORDERS ARE IN

## 2025-07-21 ENCOUNTER — OFFICE VISIT (OUTPATIENT)
Dept: FAMILY MEDICINE CLINIC | Facility: CLINIC | Age: 75
End: 2025-07-21
Payer: MEDICARE

## 2025-07-21 VITALS
BODY MASS INDEX: 19.43 KG/M2 | WEIGHT: 105.6 LBS | OXYGEN SATURATION: 98 % | HEART RATE: 73 BPM | DIASTOLIC BLOOD PRESSURE: 78 MMHG | SYSTOLIC BLOOD PRESSURE: 110 MMHG | HEIGHT: 62 IN | TEMPERATURE: 95.6 F

## 2025-07-21 DIAGNOSIS — E78.2 MIXED HYPERLIPIDEMIA: Primary | ICD-10-CM

## 2025-07-21 DIAGNOSIS — Z79.899 HIGH RISK MEDICATION USE: ICD-10-CM

## 2025-07-21 DIAGNOSIS — M67.40 GANGLION CYST: ICD-10-CM

## 2025-07-21 DIAGNOSIS — F51.01 PRIMARY INSOMNIA: ICD-10-CM

## 2025-07-21 DIAGNOSIS — G43.009 ATYPICAL MIGRAINE: ICD-10-CM

## 2025-07-21 PROCEDURE — 99214 OFFICE O/P EST MOD 30 MIN: CPT | Performed by: NURSE PRACTITIONER

## 2025-07-21 PROCEDURE — 1126F AMNT PAIN NOTED NONE PRSNT: CPT | Performed by: NURSE PRACTITIONER

## 2025-07-21 PROCEDURE — G2211 COMPLEX E/M VISIT ADD ON: HCPCS | Performed by: NURSE PRACTITIONER

## 2025-07-21 RX ORDER — HYDROXYZINE HYDROCHLORIDE 10 MG/1
10 TABLET, FILM COATED ORAL 3 TIMES DAILY PRN
Qty: 90 TABLET | Refills: 1 | Status: SHIPPED | OUTPATIENT
Start: 2025-07-21 | End: 2025-07-23 | Stop reason: SDUPTHER

## 2025-07-21 RX ORDER — BUTALBITAL, ACETAMINOPHEN, CAFFEINE AND CODEINE PHOSPHATE 50; 325; 40; 30 MG/1; MG/1; MG/1; MG/1
1 CAPSULE ORAL EVERY 4 HOURS PRN
Qty: 60 CAPSULE | Refills: 0 | Status: SHIPPED | OUTPATIENT
Start: 2025-07-21

## 2025-07-21 NOTE — PROGRESS NOTES
Chief Complaint  Med Refill, Migraine, Ganglion Cyst, and Insomnia    Subjective        HPI   Orly presents to [facility]  as a 74 year old female for follow up on chronic conditions and to refill medications.  Overall doing well     Atypical migraines-onset was several years ago.  She is currently taking Fioricet with codeine.  She does feel this manages her symptoms very well.  She has tolerated this medication with no side effects.  She takes this medication as needed usually 1-3 times per week.  She has been slowly decreasing her dosage over the past couple of months.  She did do physical therapy that did help with her symptoms.  She is down to having migraines 1-2 times per week with no aura.  She denies any nausea or vomiting.  She does have some sensitivity to light and sound during acute migraine.  She does notice an increase in frequency during season changes.  No changes since last appointment-  medication working well  CSA updated annually today-  will get u tox with next labs        Hyperlipidemia=  continues to work on healthy diet and exercise  Last labs 1/2025-  reviewed-  completed labs last week-  pending results       She does have a ganglion cyst on her left knee-has been there for at least the last 5 years.  No changes  Discussed again today  Occasionally she gets some tingling when she does extra exercise  No increase in size-  no changes, no pain-  not effecting ROM  Declines referral to dermatology/ ortho  Not causing any problems at this time    Increased insomnia-  has tried melatonin  Only sleeping 4-5 hours night  Would like to have something to take only as needed          She has no other acute complaints in office today    The following portions of the patient's history were reviewed and updated as appropriate: allergies, current medications, past family history, past medical history, past social history, past surgical history, and problem list    "    Review of Systems   Constitutional:  Negative for chills, fatigue and fever.   Eyes:  Negative for visual disturbance.   Respiratory:  Negative for cough, shortness of breath, wheezing and stridor.    Cardiovascular:  Negative for chest pain, palpitations and leg swelling.   Musculoskeletal:  Negative for arthralgias.   Neurological:  Negative for dizziness.   Psychiatric/Behavioral:  Negative for self-injury, sleep disturbance and suicidal ideas. The patient is not nervous/anxious.         Objective   Vital Signs:   Vitals:    07/21/25 1025   BP: 110/78   Pulse: 73   Temp: 95.6 °F (35.3 °C)   SpO2: 98%   Weight: 47.9 kg (105 lb 9.6 oz)   Height: 157 cm (61.81\")   PainSc: 0-No pain            4/14/2025     9:13 AM   PHQ-2/PHQ-9 Depression Screening   Little interest or pleasure in doing things Not at all   Feeling down, depressed, or hopeless Not at all   How difficult have these problems made it for you to do your work, take care of things at home, or get along with other people? Not difficult at all       BMI is within normal parameters. No other follow-up for BMI required.        Physical Exam  Vitals reviewed.   Constitutional:       General: She is not in acute distress.  Eyes:      Conjunctiva/sclera: Conjunctivae normal.   Neck:      Thyroid: No thyromegaly.      Vascular: No carotid bruit.   Cardiovascular:      Rate and Rhythm: Normal rate and regular rhythm.      Heart sounds: Normal heart sounds. No murmur heard.  Pulmonary:      Effort: Pulmonary effort is normal. No respiratory distress.      Breath sounds: Normal breath sounds. No stridor. No wheezing, rhonchi or rales.   Chest:      Chest wall: No tenderness.   Musculoskeletal:      Right knee: Normal.      Left knee: Normal. No erythema. Normal range of motion. No tenderness.        Legs:       Comments: Small soft mobile cyst -  no changes from last visit <0.5cm   Neurological:      Mental Status: She is alert and oriented to person, place, and " time.   Psychiatric:         Attention and Perception: Attention normal.         Mood and Affect: Mood normal.          Result Review :     The following data was reviewed by: CHANTEL Peñaloza on 07/21/2025:      Comprehensive Metabolic Panel (01/10/2025 10:50)  CBC & Differential (01/10/2025 10:50)  Lipid Panel (01/10/2025 10:50)  TSH (01/10/2025 10:50)  T4, Free (01/10/2025 10:50)  Vitamin D,25-Hydroxy (01/10/2025 10:50)  Overall your labs are stable from last check   Thyroid is still just a little bit elevated but T4 is normal   This is something we will just watch with labs     Cholesterol still just a little bit elevated but no significant changes   Just continue to work on healthy diet and exercise     No other major changes on your labs       ToxAssure Flex 23, Ur - (07/15/2025 11:18)  Comprehensive Metabolic Panel (07/15/2025 11:18)  CBC & Differential (07/15/2025 11:18)  Lipid Panel (07/15/2025 11:18)  TSH (07/15/2025 11:18)  T4, Free (07/15/2025 11:18)  Vitamin D,25-Hydroxy (07/15/2025 11:18)  pending         Assessment and Plan       Atypical migraine  Headaches are stable.    Plan:  Continue same medication/s without change.     Discussed medication dosage, use, side effects, and goals of treatment in detail.    Discussed monitoring symptoms and use of quick-relief medications and maintenance medication.    General Treatment Goals:   symptom prevention  minimize work absence  minimizing limitation in activity  prevention of exacerbations  decrease use of ER/inpatient care  minimization of adverse effects of treatment    Followup in 6 months                Orders:    butalbital-acetaminophen-caffeine-codeine (FIORICET WITH CODEINE) -13-30 MG per capsule; Take 1 capsule by mouth Every 4 (Four) Hours As Needed for Headache.    Mixed hyperlipidemia   Lipid abnormalities are stable    Plan:  Continue same medication/s without change.      Discussed medication dosage, use, side effects, and goals  of treatment in detail.    Counseled patient on lifestyle modifications to help control hyperlipidemia.     Patient Treatment Goals:   LDL goal is under 100    Followup in 6 months.         Primary insomnia  Sleep hygiene discussed  Avoid daytime naps  Trial hydroxizine-  D/c if excessive drowsiness or SE       High risk medication use  Annual CSA signed 04/14/2025-  u tox 7/25  Follow up every 3 months       Ganglion cyst  No change  Denies pain  Declined referral to ortho or derm               Follow Up   Return in about 3 months (around 10/21/2025) for Medicare Wellness, Follow up/ Medication Check.  Patient was given instructions and counseling regarding her condition or for health maintenance advice. Please see specific information pulled into the AVS if appropriate.

## 2025-07-23 ENCOUNTER — TELEPHONE (OUTPATIENT)
Dept: FAMILY MEDICINE CLINIC | Facility: CLINIC | Age: 75
End: 2025-07-23
Payer: MEDICARE

## 2025-07-23 LAB
1OH-MIDAZOLAM UR QL SCN: NOT DETECTED NG/MG CREAT
25(OH)D3+25(OH)D2 SERPL-MCNC: 79.7 NG/ML (ref 30–100)
6MAM UR QL SCN: NEGATIVE NG/ML
7AMINOCLONAZEPAM/CREAT UR: NOT DETECTED NG/MG CREAT
A-OH ALPRAZ/CREAT UR: NOT DETECTED NG/MG CREAT
A-OH-TRIAZOLAM/CREAT UR CFM: NOT DETECTED NG/MG CREAT
ACP UR QL CFM: NOT DETECTED
ALBUMIN SERPL-MCNC: 4.2 G/DL (ref 3.8–4.8)
ALP SERPL-CCNC: 64 IU/L (ref 44–121)
ALPRAZ/CREAT UR CFM: NOT DETECTED NG/MG CREAT
ALT SERPL-CCNC: 17 IU/L (ref 0–32)
AMOBARBITAL UR CFM-MCNC: NOT DETECTED NG/ML
AMPHETAMINES UR QL SCN: NEGATIVE NG/ML
APAP UR QL SCN: NORMAL UG/ML
APAP UR QL: NORMAL
APAP UR-MCNC: PRESENT UG/ML
AST SERPL-CCNC: 24 IU/L (ref 0–40)
BARBITAL UR QL CFM: NOT DETECTED
BARBITURATES UR QL CFM: NORMAL
BARBITURATES UR QL SCN: NORMAL NG/ML
BASOPHILS # BLD AUTO: 0.1 X10E3/UL (ref 0–0.2)
BASOPHILS NFR BLD AUTO: 1 %
BENZODIAZ SCN METH UR: NEGATIVE
BILIRUB SERPL-MCNC: 0.3 MG/DL (ref 0–1.2)
BUN SERPL-MCNC: 14 MG/DL (ref 8–27)
BUN/CREAT SERPL: 20 (ref 12–28)
BUPRENORPHINE UR QL SCN: NEGATIVE
BUPRENORPHINE/CREAT UR: NOT DETECTED NG/MG CREAT
BUTABARBITAL UR QL CFM: NOT DETECTED
BUTALBITAL UR CFM-MCNC: PRESENT NG/ML
CALCIUM SERPL-MCNC: 9.6 MG/DL (ref 8.7–10.3)
CANNABINOIDS UR QL SCN: NEGATIVE NG/ML
CARISOPRODOL UR QL: NEGATIVE NG/ML
CHLORIDE SERPL-SCNC: 105 MMOL/L (ref 96–106)
CHOLEST SERPL-MCNC: 190 MG/DL (ref 100–199)
CLONAZEPAM/CREAT UR CFM: NOT DETECTED NG/MG CREAT
CO2 SERPL-SCNC: 22 MMOL/L (ref 20–29)
COCAINE+BZE UR QL SCN: NEGATIVE NG/ML
CODEINE UR CFM-MCNC: 478 NG/MG CREAT
CREAT SERPL-MCNC: 0.7 MG/DL (ref 0.57–1)
CREAT UR-MCNC: 88 MG/DL
D-METHORPHAN UR-MCNC: NOT DETECTED NG/ML
D-METHORPHAN+LEVORPHANOL UR QL: NOT DETECTED
DESALKYLFLURAZ/CREAT UR: NOT DETECTED NG/MG CREAT
DHC/CREAT UR: NOT DETECTED NG/MG CREAT
DIAZEPAM/CREAT UR: NOT DETECTED NG/MG CREAT
EGFRCR SERPLBLD CKD-EPI 2021: 91 ML/MIN/1.73
EOSINOPHIL # BLD AUTO: 0.1 X10E3/UL (ref 0–0.4)
EOSINOPHIL NFR BLD AUTO: 2 %
ERYTHROCYTE [DISTWIDTH] IN BLOOD BY AUTOMATED COUNT: 12.5 % (ref 11.7–15.4)
ETHANOL UR QL SCN: NEGATIVE G/DL
ETHANOL UR QL SCN: NEGATIVE NG/ML
FENTANYL CTO UR SCN-MCNC: NEGATIVE NG/ML
FENTANYL/CREAT UR: NOT DETECTED NG/MG CREAT
FLUNITRAZEPAM UR QL SCN: NOT DETECTED NG/MG CREAT
GABAPENTIN UR-MCNC: NEGATIVE UG/ML
GLOBULIN SER CALC-MCNC: 2.3 G/DL (ref 1.5–4.5)
GLUCOSE SERPL-MCNC: 93 MG/DL (ref 70–99)
HALLUCINOGENS UR: NEGATIVE
HCT VFR BLD AUTO: 40.9 % (ref 34–46.6)
HDLC SERPL-MCNC: 68 MG/DL
HGB BLD-MCNC: 13.3 G/DL (ref 11.1–15.9)
HYDROCODONE UR CFM-MCNC: NOT DETECTED NG/MG CREAT
HYDROMORPHONE UR CFM-MCNC: NOT DETECTED NG/MG CREAT
HYPNOTICS UR QL SCN: NEGATIVE
IMM GRANULOCYTES # BLD AUTO: 0 X10E3/UL (ref 0–0.1)
IMM GRANULOCYTES NFR BLD AUTO: 0 %
KETAMINE UR QL: NOT DETECTED
LDLC SERPL CALC-MCNC: 110 MG/DL (ref 0–99)
LORAZEPAM/CREAT UR: NOT DETECTED NG/MG CREAT
LYMPHOCYTES # BLD AUTO: 1.9 X10E3/UL (ref 0.7–3.1)
LYMPHOCYTES NFR BLD AUTO: 31 %
MCH RBC QN AUTO: 30.7 PG (ref 26.6–33)
MCHC RBC AUTO-ENTMCNC: 32.5 G/DL (ref 31.5–35.7)
MCV RBC AUTO: 95 FL (ref 79–97)
MEPERIDINE UR QL SCN: NEGATIVE NG/ML
MEPHOBARBITAL UR QL CFM: NOT DETECTED
METHADONE UR QL SCN: NEGATIVE NG/ML
METHADONE+METAB UR QL SCN: NEGATIVE NG/ML
MIDAZOLAM/CREAT UR CFM: NOT DETECTED NG/MG CREAT
MISCELLANEOUS, UR: NEGATIVE
MONOCYTES # BLD AUTO: 0.6 X10E3/UL (ref 0.1–0.9)
MONOCYTES NFR BLD AUTO: 9 %
MORPHINE UR CFM-MCNC: 251 NG/MG CREAT
NEUTROPHILS # BLD AUTO: 3.5 X10E3/UL (ref 1.4–7)
NEUTROPHILS NFR BLD AUTO: 57 %
NORBUPRENORPHINE/CREAT UR: NOT DETECTED NG/MG CREAT
NORCODEINE/CREAT UR CFM: 84 NG/MG CREAT
NORDIAZEPAM/CREAT UR: NOT DETECTED NG/MG CREAT
NORFENTANYL/CREAT UR: NOT DETECTED NG/MG CREAT
NORFLUNITRAZEPAM UR-MCNC: NOT DETECTED NG/MG CREAT
NORHYDROCODONE UR CFM-MCNC: NOT DETECTED NG/MG CREAT
NORKETAMINE UR-MCNC: NOT DETECTED UG/ML
NORMORPHINE UR-MCNC: 191 NG/MG CREAT
OPIATES UR QL CFM: NORMAL
OPIATES UR SCN-MCNC: NORMAL NG/ML
OXAZEPAM/CREAT UR: NOT DETECTED NG/MG CREAT
OXYCODONE CTO UR SCN-MCNC: NEGATIVE NG/ML
PCP UR QL SCN: NEGATIVE NG/ML
PENTOBARB UR CFM-MCNC: NOT DETECTED NG/ML
PHENOBARB UR CFM-MCNC: NOT DETECTED NG/ML
PLATELET # BLD AUTO: 243 X10E3/UL (ref 150–450)
POTASSIUM SERPL-SCNC: 4.3 MMOL/L (ref 3.5–5.2)
PRESCRIBED MEDICATIONS: NORMAL
PROPOXYPH UR QL SCN: NEGATIVE NG/ML
PROT SERPL-MCNC: 6.5 G/DL (ref 6–8.5)
RBC # BLD AUTO: 4.33 X10E6/UL (ref 3.77–5.28)
SECOBARBITAL UR CFM-MCNC: NOT DETECTED NG/ML
SODIUM SERPL-SCNC: 141 MMOL/L (ref 134–144)
T4 FREE SERPL-MCNC: 1.14 NG/DL (ref 0.82–1.77)
TAPENTADOL CTO UR SCN-MCNC: NEGATIVE NG/ML
TEMAZEPAM/CREAT UR: NOT DETECTED NG/MG CREAT
THIOPENTAL UR QL CFM: NOT DETECTED
TRAMADOL UR QL SCN: NEGATIVE NG/ML
TRIGL SERPL-MCNC: 62 MG/DL (ref 0–149)
TSH SERPL DL<=0.005 MIU/L-ACNC: 2.77 UIU/ML (ref 0.45–4.5)
VLDLC SERPL CALC-MCNC: 12 MG/DL (ref 5–40)
WBC # BLD AUTO: 6.2 X10E3/UL (ref 3.4–10.8)
ZALEPLON UR-MCNC: NOT DETECTED NG/ML
ZOLPIDEM PHENYL-4-CARB UR QL SCN: NOT DETECTED
ZOLPIDEM UR QL SCN: NOT DETECTED
ZOPICLONE-N-OXIDE UR-MCNC: NOT DETECTED NG/ML

## 2025-07-23 RX ORDER — HYDROXYZINE HYDROCHLORIDE 10 MG/1
10 TABLET, FILM COATED ORAL 3 TIMES DAILY PRN
Qty: 270 TABLET | Refills: 1 | Status: SHIPPED | OUTPATIENT
Start: 2025-07-23 | End: 2025-10-21

## 2025-07-23 NOTE — TELEPHONE ENCOUNTER
Caller: Orly Rivas    Relationship: Self    Best call back number: [phone]    What medication are you requesting:     hydrOXYzine (ATARAX) 10 MG tablet     Have you had these symptoms before:    [x] Yes  [] No    Have you been treated for these symptoms before:   [x] Yes  [] No    If a prescription is needed, what is your preferred pharmacy and phone number: [facility] [address] [phone] [facility] [phone] FX     Additional notes: PATIENT HAS TO HAVE A 90 DAY SUPPLY WITH [facility].

## 2025-07-24 ENCOUNTER — RESULTS FOLLOW-UP (OUTPATIENT)
Dept: FAMILY MEDICINE CLINIC | Facility: CLINIC | Age: 75
End: 2025-07-24
Payer: MEDICARE

## 2025-07-24 NOTE — LETTER
Orly Rivas  [address] [address]    July 25, 2025     Dear Ms. Rivas:    Below are the results from your recent visit:    Resulted Orders   Vitamin D,25-Hydroxy   Result Value Ref Range    25 Hydroxy, Vitamin D 79.7 30.0 - 100.0 ng/mL      Comment:      Vitamin D deficiency has been defined by the [place] of  Medicine and an Endocrine Society practice guideline as a  level of serum 25-OH vitamin D less than 20 ng/mL (1,2).  The Endocrine Society went on to further define vitamin D  insufficiency as a level between 21 and 29 ng/mL (2).  1. IOM ([place] of [employer]). 2010. Dietary reference     intakes for calcium and D. [place]: The     National Academies Press.  2. Cesar MF, Audrey GILL, Mayito SHARP, et al.     Evaluation, treatment, and prevention of vitamin D     deficiency: an Endocrine Society clinical practice     guideline. JCEM. 2011 Jul; 96(7):1911-30.     T4, Free   Result Value Ref Range    Free T4 1.14 0.82 - 1.77 ng/dL   TSH   Result Value Ref Range    TSH 2.770 0.450 - 4.500 uIU/mL   Lipid Panel   Result Value Ref Range    Total Cholesterol 190 100 - 199 mg/dL    Triglycerides 62 0 - 149 mg/dL    HDL Cholesterol 68 >39 mg/dL    VLDL Cholesterol Gerry 12 5 - 40 mg/dL    LDL Chol Calc ([facility]) 110 (H) 0 - 99 mg/dL   CBC & Differential   Result Value Ref Range    WBC 6.2 3.4 - 10.8 x10E3/uL    RBC 4.33 3.77 - 5.28 x10E6/uL    Hemoglobin 13.3 11.1 - 15.9 g/dL    Hematocrit 40.9 34.0 - 46.6 %    MCV 95 79 - 97 fL    MCH 30.7 26.6 - 33.0 pg    MCHC 32.5 31.5 - 35.7 g/dL    RDW 12.5 11.7 - 15.4 %    Platelets 243 150 - 450 x10E3/uL    Neutrophil Rel % 57 Not Estab. %    Lymphocyte Rel % 31 Not Estab. %    Monocyte Rel % 9 Not Estab. %    Eosinophil Rel % 2 Not Estab. %    Basophil Rel % 1 Not Estab. %    Neutrophils Absolute 3.5 1.4 - 7.0 x10E3/uL    Lymphocytes Absolute 1.9 0.7 - 3.1 x10E3/uL    Monocytes Absolute 0.6 0.1 - 0.9 x10E3/uL    Eosinophils Absolute 0.1 0.0 - 0.4  x10E3/uL    Basophils Absolute 0.1 0.0 - 0.2 x10E3/uL    Immature Granulocyte Rel % 0 Not Estab. %    Immature Grans Absolute 0.0 0.0 - 0.1 x10E3/uL   Comprehensive Metabolic Panel   Result Value Ref Range    Glucose 93 70 - 99 mg/dL    BUN 14 8 - 27 mg/dL    Creatinine 0.70 0.57 - 1.00 mg/dL    EGFR Result 91 >59 mL/min/1.73    BUN/Creatinine Ratio 20 12 - 28    Sodium 141 134 - 144 mmol/L    Potassium 4.3 3.5 - 5.2 mmol/L    Chloride 105 96 - 106 mmol/L    Total CO2 22 20 - 29 mmol/L    Calcium 9.6 8.7 - 10.3 mg/dL    Total Protein 6.5 6.0 - 8.5 g/dL    Albumin 4.2 3.8 - 4.8 g/dL    Globulin 2.3 1.5 - 4.5 g/dL    Total Bilirubin 0.3 0.0 - 1.2 mg/dL    Alkaline Phosphatase 64 44 - 121 IU/L    AST (SGOT) 24 0 - 40 IU/L    ALT (SGPT) 17 0 - 32 IU/L   ToxAssure Flex 23, Ur -   Result Value Ref Range    Report Summary FINAL       Comment:      ====================================================================  Opiate Class, MS, Ur RFX  Barbiturates, MS, Ur RFX  Acetaminophen, MS, Ur RFX  ToxAssure Flex 23, Ur  ====================================================================  Test                             Result       Flag       Units  Drug Present    Codeine                        478                     ng/mg creat    Morphine                       251                     ng/mg creat    Normorphine                    191                     ng/mg creat    Norcodeine                     84                      ng/mg creat     Sources of codeine include scheduled prescription medications;     morphine is an expected metabolite of codeine. Other sources of     morphine include scheduled prescription medications or as a     metabolite of heroin.     Normorphine is an expected metabolite of morphine.     Norcodeine is an expected metabolite of codeine.    Butalbital                     PRESENT    Acetaminophen                   PRESENT  ====================================================================  Test                       Result    Flag   Units      Ref Range    Creatinine              88               mg/dL      >=20  ====================================================================  Declared Medications:   Medication list was not provided.  ====================================================================  For clinical consultation, please call [phone].  ====================================================================      CREATININE 88 >=20 mg/dL      Comment:      REFERENCE RANGE: Ref Range>=20    Amphetamines, IA Negative CUTOFF:300 ng/mL    Benzodiazepines Negative     Diazepam Urine, Qualitative Not Detected ng/mg creat    Desmethyldiazepam Not Detected ng/mg creat    Oxazepam, urine Not Detected ng/mg creat    Temazepam Not Detected ng/mg creat      Comment:      Expected metabolism of benzodiazepine class drugs:   Parent Drug       Detected Metabolites   -----------       --------------------   Diazepam:         Desmethyldiazepam, Temazepam, Oxazepam   Chlordiazepoxide: Desmethyldiazepam, Oxazepam   Clorazepate:      Desmethyldiazepam, Oxazepam   Halazepam:        Desmethyldiazepam, Oxazepam   Temazepam:        Oxazepam   Oxazepam:         None      Alprazolam Urine, Conf Not Detected ng/mg creat    Alpha-hydroxyalprazolam, Urine Not Detected ng/mg creat    Desalkylflurazepam, Urine Not Detected ng/mg creat    Lorazepam, Urine Not Detected ng/mg creat    Alpha-hydroxytriazolam, Urine Not Detected ng/mg creat    Clonazepam Not Detected ng/mg creat    7- AMINOCLONAZEPAM Not Detected ng/mg creat    Midazolam, Urine Not Detected ng/mg creat    Alpha-hydroxymidazolam, Urine Not Detected ng/mg creat    Flunitrazepam Not Detected ng/mg creat    DESMETHYLFLUNITRAZEPAM Not Detected ng/mg creat    COCAINE / METABOLITE, IA Negative CUTOFF:150 ng/mL    Ethyl Alcohol, Enz Negative CUTOFF:0.020 g/dL    Ethanol and Ethanol Biomarkers Negative CUTOFF:500 ng/mL    Cannavinoids IA Negative CUTOFF:20 ng/mL     6-Acetylmorphine IA Negative CUTOFF:10 ng/mL    Opiate Class IA Comment CUTOFF:100 ng/mL      Comment:      Further testing indicated    Oxycodone Class IA Negative CUTOFF:100 ng/mL    METHADONE, IA Negative CUTOFF:100 ng/mL    Methadone MTB IA Negative CUTOFF:100 ng/mL    Buprenorphine, Urine Negative     Buprenorphine, Urine Not Detected ng/mg creat    Norbuprenorphine Not Detected ng/mg creat    Fentanyl Negative     Fentanyl, Urine Not Detected ng/mg creat    Norfentanyl Urine Not Detected ng/mg creat    Tapentadol, IA Negative CUTOFF:200 ng/mL    MEPERIDINE, IA Negative CUTOFF:200 ng/mL    PROPOXYPHENE, IA Negative CUTOFF:300 ng/mL    TRAMADOL, IA Negative CUTOFF:200 ng/mL    Barbiturates, IA Comment CUTOFF:200 ng/mL      Comment:      Further testing indicated    Other Hallucinogens Ur Negative     Ketamine Not Detected     Norketamine Not Detected     PHENCYCLIDINE, IA Negative CUTOFF:25 ng/mL    Gabapentin, IA Negative CUTOFF:1.0 ug/mL    Carisoprodol, IA Negative CUTOFF:100 ng/mL    SEDATIVES/HYPNOTICS Negative     Zolpidem(Ambien) Urine Not Detected     Zolpidem Acid Not Detected     Eszopiclone/Zopiclone Not Detected     Amino Chloropyridine Not Detected     Zaleplon, Ur Not Detected       Comment:      Expected metabolism of Sedatives/Hypnotics:   Parent Drug                 Detected Metabolites   -----------                 --------------------   Zolpidem:                   Zolpidem Acid   Zopiclone/Eszopiclone:      Amino Chloropyridine   Zaleplon:                   None      Acetaminophen, IA Comment CUTOFF:5.0 ug/mL      Comment:      Further testing indicated    Miscellaneous, Ur Negative     DEXTROMETHORPHAN Not Detected     Dextrorphan/Levorphanol Not Detected       Comment:      Expected metabolism of Dextromethorphan and  Dextrorphan/Levorphanol:   Parent Drug                 Detected Metabolites   -----------                 --------------------   Dextromethorphan:           Dextrorphan    Dextrorphan/Levorphanol:    None    Dextrophan cannot be distinguished from Levorphanol    by the method used for analysis.     Barbiturates, MS, Ur RFX -   Result Value Ref Range    Barbiturate GC/MS, Urine +POSITIVE+     Amobarbital Not Detected     Barbital Not Detected     BUTABARBITAL Not Detected     BUTALBITAL PRESENT     Mephobarbital Urine Not Detected     Pentobarbital UR Not Detected     Phenobarbital, GC/MS Urine Not Detected     SECOBARBITAL Not Detected     Thiopental, Ur Not Detected    Opiate Class, MS, Ur RFX -   Result Value Ref Range    Opiate Class +POSITIVE+     Codeine 478 ng/mg creat    Morphine 251 ng/mg creat    normorphine 191 ng/mg creat    Norcodeine Ur 84 ng/mg creat    Hydrocodone Not Detected ng/mg creat    Hydromorphone Not Detected ng/mg creat    Dihydrocodeine Not Detected ng/mg creat    Norhydrocodone Not Detected ng/mg creat      Comment:      Expected metabolism of opiate class drugs:  Parent Drug       Detected Metabolites   -----------       --------------------   Codeine:          Major:  Morphine, Norcodeine                     Minor:  Hydrocodone, Hydromorphone,                             Dihydrocodeine, Norhydrocodone,                             Normorphine   Morphine:         Major:  Normorphine                     Minor:  Hydromorphone   Hydrocodone:      Hydromorphone, Dihydrocodeine,                     Norhydrocodone   Hydromorphone:    None   Dihydrocodeine:   None   Heroin:           6-Acetylmorphine (if included),                     Morphine, Normorphine                     Codeine, in small amounts in comparison                      to morphine, is often detected when                      heroin is the source drug.     Acetaminophen, MS, Ur RFX -   Result Value Ref Range    Acetaminophen Confirmation +POSITIVE+     Acetaminophen, Ur PRESENT         your lab results are back!    Normal kidney, and liver enzymes,  Thyroid normal, not anemic  No concerns with blood  sugar  Cholesterol LDL is just slightly elevated-  improved from last year  Continue healthy diet and exercise  Vitamin D normal     No major concerns on labs.    If you have any questions or concerns, please don't hesitate to call.         Sincerely,        CHANTEL Peñaloza

## 2025-07-24 NOTE — PROGRESS NOTES
Good Morning Orly , your lab results are back!    Normal kidney, and liver enzymes,  Thyroid normal, not anemic  No concerns with blood sugar  Cholesterol LDL is just slightly elevated-  improved from last year  Continue healthy diet and exercise  Vitamin D normal    No major concerns on labs.  Let me know if you have any questions or concerns  Evelin

## 2025-08-06 ENCOUNTER — HOSPITAL ENCOUNTER (OUTPATIENT)
Dept: MAMMOGRAPHY | Facility: HOSPITAL | Age: 75
Discharge: HOME | End: 2025-08-06
Payer: MEDICARE

## 2025-08-06 ENCOUNTER — HOSPITAL ENCOUNTER (OUTPATIENT)
Dept: BONE DENSITY | Facility: HOSPITAL | Age: 75
Discharge: HOME | End: 2025-08-06
Payer: MEDICARE

## 2025-08-06 DIAGNOSIS — Z12.31 ENCOUNTER FOR SCREENING MAMMOGRAM FOR MALIGNANT NEOPLASM OF BREAST: ICD-10-CM

## 2025-08-06 PROCEDURE — 77080 DXA BONE DENSITY AXIAL: CPT

## 2025-08-06 PROCEDURE — 77063 BREAST TOMOSYNTHESIS BI: CPT

## 2025-08-06 PROCEDURE — 77067 SCR MAMMO BI INCL CAD: CPT

## 2025-08-18 ENCOUNTER — OFFICE VISIT (OUTPATIENT)
Dept: FAMILY MEDICINE CLINIC | Facility: CLINIC | Age: 75
End: 2025-08-18
Payer: MEDICARE

## 2025-08-18 VITALS
DIASTOLIC BLOOD PRESSURE: 60 MMHG | BODY MASS INDEX: 19.21 KG/M2 | OXYGEN SATURATION: 97 % | TEMPERATURE: 95.2 F | SYSTOLIC BLOOD PRESSURE: 100 MMHG | HEIGHT: 62 IN | HEART RATE: 68 BPM | WEIGHT: 104.4 LBS

## 2025-08-18 DIAGNOSIS — N30.00 ACUTE CYSTITIS WITHOUT HEMATURIA: Primary | ICD-10-CM

## 2025-08-18 PROCEDURE — 99213 OFFICE O/P EST LOW 20 MIN: CPT | Performed by: NURSE PRACTITIONER

## 2025-08-18 PROCEDURE — 1126F AMNT PAIN NOTED NONE PRSNT: CPT | Performed by: NURSE PRACTITIONER

## 2025-08-18 PROCEDURE — G2211 COMPLEX E/M VISIT ADD ON: HCPCS | Performed by: NURSE PRACTITIONER

## 2025-08-18 RX ORDER — AMOXICILLIN AND CLAVULANATE POTASSIUM 400; 57 MG/5ML; MG/5ML
875 POWDER, FOR SUSPENSION ORAL 2 TIMES DAILY
Qty: 152.6 ML | Refills: 0 | Status: SHIPPED | OUTPATIENT
Start: 2025-08-18 | End: 2025-08-25

## 2025-08-21 LAB
APPEARANCE UR: CLEAR
BACTERIA #/AREA URNS HPF: NORMAL /[HPF]
BACTERIA UR CULT: NORMAL
BACTERIA UR CULT: NORMAL
BILIRUB UR QL STRIP: NEGATIVE
CASTS URNS QL MICRO: NORMAL /LPF
COLOR UR: YELLOW
EPI CELLS #/AREA URNS HPF: NORMAL /HPF (ref 0–10)
GLUCOSE UR QL STRIP: NEGATIVE
HGB UR QL STRIP: NEGATIVE
KETONES UR QL STRIP: NEGATIVE
LEUKOCYTE ESTERASE UR QL STRIP: ABNORMAL
MICRO URNS: ABNORMAL
NITRITE UR QL STRIP: NEGATIVE
PH UR STRIP: 6 [PH] (ref 5–7.5)
PROT UR QL STRIP: NEGATIVE
RBC #/AREA URNS HPF: NORMAL /HPF (ref 0–2)
SP GR UR STRIP: 1.01 (ref 1–1.03)
URINALYSIS REFLEX: ABNORMAL
UROBILINOGEN UR STRIP-MCNC: 0.2 MG/DL (ref 0.2–1)
WBC #/AREA URNS HPF: NORMAL /HPF (ref 0–5)